# Patient Record
Sex: MALE | Race: NATIVE HAWAIIAN OR OTHER PACIFIC ISLANDER | NOT HISPANIC OR LATINO | Employment: FULL TIME | URBAN - METROPOLITAN AREA
[De-identification: names, ages, dates, MRNs, and addresses within clinical notes are randomized per-mention and may not be internally consistent; named-entity substitution may affect disease eponyms.]

---

## 2017-07-20 ENCOUNTER — OFFICE VISIT (OUTPATIENT)
Dept: URGENT CARE | Facility: PHYSICIAN GROUP | Age: 37
End: 2017-07-20
Payer: COMMERCIAL

## 2017-07-20 VITALS
WEIGHT: 303 LBS | TEMPERATURE: 99.6 F | BODY MASS INDEX: 50.48 KG/M2 | HEART RATE: 120 BPM | HEIGHT: 65 IN | OXYGEN SATURATION: 90 % | RESPIRATION RATE: 20 BRPM | SYSTOLIC BLOOD PRESSURE: 146 MMHG | DIASTOLIC BLOOD PRESSURE: 92 MMHG

## 2017-07-20 DIAGNOSIS — J20.9 BRONCHOSPASM WITH BRONCHITIS, ACUTE: ICD-10-CM

## 2017-07-20 DIAGNOSIS — J01.90 ACUTE RHINOSINUSITIS: ICD-10-CM

## 2017-07-20 DIAGNOSIS — J30.9 ALLERGIC RHINITIS, UNSPECIFIED ALLERGIC RHINITIS TRIGGER, UNSPECIFIED RHINITIS SEASONALITY: ICD-10-CM

## 2017-07-20 PROCEDURE — 99203 OFFICE O/P NEW LOW 30 MIN: CPT | Performed by: EMERGENCY MEDICINE

## 2017-07-20 RX ORDER — AMOXICILLIN AND CLAVULANATE POTASSIUM 875; 125 MG/1; MG/1
1 TABLET, FILM COATED ORAL 2 TIMES DAILY
Qty: 14 TAB | Refills: 0 | Status: SHIPPED | OUTPATIENT
Start: 2017-07-20 | End: 2017-07-27

## 2017-07-20 RX ORDER — BENZONATATE 100 MG/1
100 CAPSULE ORAL 3 TIMES DAILY PRN
Qty: 20 CAP | Refills: 0 | Status: SHIPPED | OUTPATIENT
Start: 2017-07-20 | End: 2019-08-19

## 2017-07-20 ASSESSMENT — ENCOUNTER SYMPTOMS
SORE THROAT: 1
ABDOMINAL PAIN: 0
NAUSEA: 0
HEMOPTYSIS: 0
EYE DISCHARGE: 0
DIARRHEA: 0
SHORTNESS OF BREATH: 0
SWOLLEN GLANDS: 0
SPUTUM PRODUCTION: 1
CHILLS: 0
HEARTBURN: 0
HOARSE VOICE: 0
VOMITING: 0
SINUS PRESSURE: 1
WHEEZING: 1
COUGH: 1
HEADACHES: 0

## 2017-07-20 NOTE — PATIENT INSTRUCTIONS
Avoid smoking!  Cessation is highly recommended, but at least attempt to reduce frequency of smoking until the illness resolves.  Use saline nasal irrigation, such as with a Neti Pot, as needed daily for relief of nasal or sinus congestion relief.  Use over-the-counter inhaled nasal steroid (Flonase, Nasonex, Rhinocort, Nasacort) daily; continue for at least 2-3 weeks.  Use over-the-counter oxymetazoline (Afrin) nasal spray as needed for up to 3 days only; follow package instructions for dosing.  Use over-the-counter pain reliever, such as acetaminophen (Tylenol), ibuprofen (Advil, Motrin) or naproxen (Aleve) as needed; follow package directions for dosing.  Use the prescribed antibiotic Augmentin only if symptoms persist beyond 10-14 days, or re-worsen.  Use an oral probiotic daily, such as Culturelle, Align, or yogurt to reduce gastrointestinal symptoms.      Bronchospasm, Adult  A bronchospasm is when the tubes that carry air in and out of your lungs (airways) spasm or tighten. During a bronchospasm it is hard to breathe. This is because the airways get smaller. A bronchospasm can be triggered by:  · Allergies. These may be to animals, pollen, food, or mold.  · Infection. This is a common cause of bronchospasm.  · Exercise.  · Irritants. These include pollution, cigarette smoke, strong odors, aerosol sprays, and paint fumes.  · Weather changes.  · Stress.  · Being emotional.  HOME CARE   · Always have a plan for getting help. Know when to call your doctor and local emergency services (911 in the [place] Know where you can get emergency care.  · Only take medicines as told by your doctor.  · If you were prescribed an inhaler or nebulizer machine, ask your doctor how to use it correctly. Always use a spacer with your inhaler if you were given one.  · Stay calm during an attack. Try to relax and breathe more slowly.  · Control your home environment:  ¨ Change your heating and air conditioning filter at least once a  month.  ¨ Limit your use of fireplaces and wood stoves.  ¨ Do not  smoke. Do not  allow smoking in your home.  ¨ Avoid perfumes and fragrances.  ¨ Get rid of pests (such as roaches and mice) and their droppings.  ¨ Throw away plants if you see mold on them.  ¨ Keep your house clean and dust free.  ¨ Replace carpet with wood, tile, or vinyl franki. Carpet can trap dander and dust.  ¨ Use allergy-proof pillows, mattress covers, and box spring covers.  ¨ Wash bed sheets and blankets every week in hot water. Dry them in a dryer.  ¨ Use blankets that are made of polyester or cotton.  ¨ Wash hands frequently.  GET HELP IF:  · You have muscle aches.  · You have chest pain.  · The thick spit you spit or cough up (sputum) changes from clear or white to yellow, green, gray, or bloody.  · The thick spit you spit or cough up gets thicker.  · There are problems that may be related to the medicine you are given such as:  ¨ A rash.  ¨ Itching.  ¨ Swelling.  ¨ Trouble breathing.  GET HELP RIGHT AWAY IF:  · You feel you cannot breathe or catch your breath.  · You cannot stop coughing.  · Your treatment is not helping you breathe better.  · You have very bad chest pain.  MAKE SURE YOU:   · Understand these instructions.  · Will watch your condition.  · Will get help right away if you are not doing well or get worse.     This information is not intended to replace advice given to you by your health care provider. Make sure you discuss any questions you have with your health care provider.     Document Released: 10/15/2010 Document Revised: 01/08/2016 Document Reviewed: 06/10/2014  [employer] Interactive Patient Education ©2016 [employer]  Sinusitis, Adult  Sinusitis is redness, soreness, and inflammation of the paranasal sinuses. Paranasal sinuses are air pockets within the bones of your face. They are located beneath your eyes, in the middle of your forehead, and above your eyes. In healthy paranasal sinuses, mucus is able to  drain out, and air is able to circulate through them by way of your nose. However, when your paranasal sinuses are inflamed, mucus and air can become trapped. This can allow bacteria and other germs to grow and cause infection.  Sinusitis can develop quickly and last only a short time (acute) or continue over a long period (chronic). Sinusitis that lasts for more than 12 weeks is considered chronic.  CAUSES  Causes of sinusitis include:  · Allergies.  · Structural abnormalities, such as displacement of the cartilage that separates your nostrils (deviated septum), which can decrease the air flow through your nose and sinuses and affect sinus drainage.  · Functional abnormalities, such as when the small hairs (cilia) that line your sinuses and help remove mucus do not work properly or are not present.  SIGNS AND SYMPTOMS  Symptoms of acute and chronic sinusitis are the same. The primary symptoms are pain and pressure around the affected sinuses. Other symptoms include:  · Upper toothache.  · Earache.  · Headache.  · Bad breath.  · Decreased sense of smell and taste.  · A cough, which worsens when you are lying flat.  · Fatigue.  · Fever.  · Thick drainage from your nose, which often is green and may contain pus (purulent).  · Swelling and warmth over the affected sinuses.  DIAGNOSIS  Your health care provider will perform a physical exam. During your exam, your health care provider may perform any of the following to help determine if you have acute sinusitis or chronic sinusitis:  · Look in your nose for signs of abnormal growths in your nostrils (nasal polyps).  · Tap over the affected sinus to check for signs of infection.  · View the inside of your sinuses using an imaging device that has a light attached (endoscope).  If your health care provider suspects that you have chronic sinusitis, one or more of the following tests may be recommended:  · Allergy tests.  · Nasal culture. A sample of mucus is taken from your  nose, sent to a lab, and screened for bacteria.  · Nasal cytology. A sample of mucus is taken from your nose and examined by your health care provider to determine if your sinusitis is related to an allergy.  TREATMENT  Most cases of acute sinusitis are related to a viral infection and will resolve on their own within 10 days. Sometimes, medicines are prescribed to help relieve symptoms of both acute and chronic sinusitis. These may include pain medicines, decongestants, nasal steroid sprays, or saline sprays.  However, for sinusitis related to a bacterial infection, your health care provider will prescribe antibiotic medicines. These are medicines that will help kill the bacteria causing the infection.  Rarely, sinusitis is caused by a fungal infection. In these cases, your health care provider will prescribe antifungal medicine.  For some cases of chronic sinusitis, surgery is needed. Generally, these are cases in which sinusitis recurs more than 3 times per year, despite other treatments.  HOME CARE INSTRUCTIONS  · Drink plenty of water. Water helps thin the mucus so your sinuses can drain more easily.  · Use a humidifier.  · Inhale steam 3-4 times a day (for example, sit in the bathroom with the shower running).  · Apply a warm, moist washcloth to your face 3-4 times a day, or as directed by your health care provider.  · Use saline nasal sprays to help moisten and clean your sinuses.  · Take medicines only as directed by your health care provider.  · If you were prescribed either an antibiotic or antifungal medicine, finish it all even if you start to feel better.  SEEK IMMEDIATE MEDICAL CARE IF:  · You have increasing pain or severe headaches.  · You have nausea, vomiting, or drowsiness.  · You have swelling around your face.  · You have vision problems.  · You have a stiff neck.  · You have difficulty breathing.     This information is not intended to replace advice given to you by your health care provider.  Make sure you discuss any questions you have with your health care provider.     Document Released: 12/18/2006 Document Revised: 01/08/2016 Document Reviewed: 01/01/2013  [employer] Interactive Patient Education ©2016 [employer]  Allergic Rhinitis  Allergic rhinitis is when the mucous membranes in the nose respond to allergens. Allergens are particles in the air that cause your body to have an allergic reaction. This causes you to release allergic antibodies. Through a chain of events, these eventually cause you to release histamine into the blood stream. Although meant to protect the body, it is this release of histamine that causes your discomfort, such as frequent sneezing, congestion, and an itchy, runny nose.   CAUSES  Seasonal allergic rhinitis (hay fever) is caused by pollen allergens that may come from grasses, trees, and weeds. Year-round allergic rhinitis (perennial allergic rhinitis) is caused by allergens such as house dust mites, pet dander, and mold spores.  SYMPTOMS  · Nasal stuffiness (congestion).  · Itchy, runny nose with sneezing and tearing of the eyes.  DIAGNOSIS  Your health care provider can help you determine the allergen or allergens that trigger your symptoms. If you and your health care provider are unable to determine the allergen, skin or blood testing may be used. Your health care provider will diagnose your condition after taking your health history and performing a physical exam. Your health care provider may assess you for other related conditions, such as asthma, pink eye, or an ear infection.  TREATMENT  Allergic rhinitis does not have a cure, but it can be controlled by:  · Medicines that block allergy symptoms. These may include allergy shots, nasal sprays, and oral antihistamines.  · Avoiding the allergen.  Hay fever may often be treated with antihistamines in pill or nasal spray forms. Antihistamines block the effects of histamine. There are over-the-counter medicines that may  help with nasal congestion and swelling around the eyes. Check with your health care provider before taking or giving this medicine.  If avoiding the allergen or the medicine prescribed do not work, there are many new medicines your health care provider can prescribe. Stronger medicine may be used if initial measures are ineffective. Desensitizing injections can be used if medicine and avoidance does not work. Desensitization is when a patient is given ongoing shots until the body becomes less sensitive to the allergen. Make sure you follow up with your health care provider if problems continue.  HOME CARE INSTRUCTIONS  It is not possible to completely avoid allergens, but you can reduce your symptoms by taking steps to limit your exposure to them. It helps to know exactly what you are allergic to so that you can avoid your specific triggers.  SEEK MEDICAL CARE IF:  · You have a fever.  · You develop a cough that does not stop easily (persistent).  · You have shortness of breath.  · You start wheezing.  · Symptoms interfere with normal daily activities.     This information is not intended to replace advice given to you by your health care provider. Make sure you discuss any questions you have with your health care provider.     Document Released: 09/12/2002 Document Revised: 01/08/2016 Document Reviewed: 08/25/2014  [employer] Interactive Patient Education ©2016 [employer]

## 2017-07-20 NOTE — PROGRESS NOTES
Subjective:      John Wills is a 37 y.o. male who presents with Cough            Sinusitis  This is a new problem. The current episode started in the past 7 days. The problem has been gradually worsening since onset. Maximum temperature: at onset. The pain is mild. Associated symptoms include congestion, coughing, ear pain, sinus pressure and a sore throat. Pertinent negatives include no chills, headaches, hoarse voice, shortness of breath, sneezing or swollen glands. Past treatments include oral decongestants. The treatment provided mild relief.   PMH allergic rhinitis    Review of Systems   Constitutional: Negative for chills.   HENT: Positive for congestion, ear pain, sinus pressure and sore throat. Negative for ear discharge, hearing loss, hoarse voice, nosebleeds, sneezing and tinnitus.    Eyes: Negative for discharge.   Respiratory: Positive for cough, sputum production and wheezing. Negative for hemoptysis and shortness of breath.    Cardiovascular: Negative for chest pain.   Gastrointestinal: Negative for heartburn, nausea, vomiting, abdominal pain and diarrhea.   Skin: Negative for itching and rash.   Neurological: Negative for headaches.   Endo/Heme/Allergies: Positive for environmental allergies.     PMH:  has a past medical history of Allergy. He also has no past medical history of Asthma or Diabetes (CMS-[facility]).  MEDS:   Current outpatient prescriptions:   •  Phenylephrine-APAP-Guaifenesin (MUCINEX FAST-MAX COLD & SINUS PO), Take  by mouth., Disp: , Rfl:   •  Albuterol Sulfate 108 (90 BASE) MCG/ACT AEROSOL POWDER, BREATH ACTIVATED, Inhale 1-2 Puffs by mouth every 6 hours as needed (coughing, wheezing)., Disp: 1 Each, Rfl: 0  •  benzonatate (TESSALON) 100 MG Cap, Take 1 Cap by mouth 3 times a day as needed for Cough., Disp: 20 Cap, Rfl: 0  •  amoxicillin-clavulanate (AUGMENTIN) 875-125 MG Tab, Take 1 Tab by mouth 2 times a day for 7 days., Disp: 14 Tab, Rfl: 0  •  fluticasone (FLONASE) 50 MCG/ACT  "nasal spray, Spray 2 Sprays in nose every day., Disp: 16 g, Rfl: 0  •  naproxen (NAPROSYN) 500 MG TABS, Take 1 Tab by mouth 2 times a day, with meals., Disp: 30 Tab, Rfl: 0  •  hydrocod polst-chlorphen polst (TUSSIONEX) 10-8 MG/5ML LQCR, Take 5 mL by mouth every 12 hours., Disp: 120 mL, Rfl: 0  ALLERGIES:   Allergies   Allergen Reactions   • Sulfa Drugs      SURGHX: History reviewed. No pertinent past surgical history.  SOCHX:  reports that he has been smoking Cigarettes.  He does not have any smokeless tobacco history on file.  FH: family history is not on file.       Objective:     /92 mmHg  Pulse 120  Temp(Src) 37.6 °C (99.6 °F)  Resp 20  Ht 1.651 m (5' 5\")  Wt 137.44 kg (303 lb)  BMI 50.42 kg/m2  SpO2 90%     Physical Exam   Constitutional: He appears well-developed and well-nourished. He is cooperative.  Non-toxic appearance. He does not appear ill. No distress.   HENT:   Head: Normocephalic.   Right Ear: Tympanic membrane and ear canal normal.   Left Ear: Tympanic membrane and ear canal normal.   Nose: Mucosal edema and rhinorrhea present. Right sinus exhibits maxillary sinus tenderness. Left sinus exhibits maxillary sinus tenderness.   Mouth/Throat: Uvula is midline. No trismus in the jaw. No oropharyngeal exudate, posterior oropharyngeal edema or posterior oropharyngeal erythema.   Eyes: Conjunctivae are normal.   Neck: Trachea normal. Neck supple.   Cardiovascular: Normal rate, regular rhythm and normal heart sounds.    Not tachycardic. No significant pedal edema.   Pulmonary/Chest: No accessory muscle usage. Tachypnea noted. He has wheezes. He has rhonchi. He has no rales.   Rhonchi clear with cough.   Lymphadenopathy:     He has no cervical adenopathy.   Neurological: He is alert.   Skin: Skin is warm and dry.   Psychiatric: He has a normal mood and affect.               Assessment/Plan:     1. Acute rhinosinusitis  Wait and see ATB due to duration  - amoxicillin-clavulanate (AUGMENTIN) " 875-125 MG Tab; Take 1 Tab by mouth 2 times a day for 7 days.  Dispense: 14 Tab; Refill: 0    2. Bronchospasm with bronchitis, acute  Advised smoking avoidance/cessation.  - Albuterol Sulfate 108 (90 BASE) MCG/ACT AEROSOL POWDER, BREATH ACTIVATED; Inhale 1-2 Puffs by mouth every 6 hours as needed (coughing, wheezing).  Dispense: 1 Each; Refill: 0  - benzonatate (TESSALON) 100 MG Cap; Take 1 Cap by mouth 3 times a day as needed for Cough.  Dispense: 20 Cap; Refill: 0    3. Allergic rhinitis, unspecified allergic rhinitis trigger, unspecified rhinitis seasonality  Recommended nasal saline irrigation daily, OTC inhaled nasal steroid daily, OTC decongestant when necessary as tolerated.

## 2017-07-20 NOTE — MR AVS SNAPSHOT
"        John Webbro   2017 9:30 AM   Office Visit   MRN: [id number]    Department:  [facility]   Dept Phone:  [phone]    Description:  Male : 1980   Provider:  Mateo Luna M.D.           Reason for Visit     Cough sinus pain ans pressure, Nasal and chest congestion X 1 week       Allergies as of 2017     Allergen Noted Reactions    Sulfa Drugs 2015         You were diagnosed with     Acute rhinosinusitis   [242152]       Bronchospasm with bronchitis, acute   [[id number]]       Allergic rhinitis, unspecified allergic rhinitis trigger, unspecified rhinitis seasonality   [6150388]         Vital Signs     Blood Pressure Pulse Temperature Respirations Height Weight    146/92 mmHg 120 37.6 °C (99.6 °F) 20 1.651 m (5' 5\") 137.44 kg (303 lb)    Body Mass Index Oxygen Saturation Smoking Status             50.42 kg/m2 90% Current Every Day Smoker         Basic Information     Date Of Birth Sex Race Ethnicity Preferred Language    1980 Male Unknown Unknown English      Health Maintenance        Date Due Completion Dates    IMM DTaP/Tdap/Td Vaccine (1 - Tdap) 3/16/1999 ---    IMM INFLUENZA (1) 2017 ---            Current Immunizations     No immunizations on file.      Below and/or attached are the medications your provider expects you to take. Review all of your home medications and newly ordered medications with your provider and/or pharmacist. Follow medication instructions as directed by your provider and/or pharmacist. Please keep your medication list with you and share with your provider. Update the information when medications are discontinued, doses are changed, or new medications (including over-the-counter products) are added; and carry medication information at all times in the event of emergency situations     Allergies:  SULFA DRUGS - (reactions not documented)               Medications  Valid as of: 2017 - 10:22 AM    Generic Name Brand Name Tablet Size " Instructions for use    Albuterol Sulfate (AEROSOL POWDER, BREATH ACTIVATED) Albuterol Sulfate 108 (90 BASE) MCG/ACT Inhale 1-2 Puffs by mouth every 6 hours as needed (coughing, wheezing).        Amoxicillin-Pot Clavulanate (Tab) AUGMENTIN 875-125 MG Take 1 Tab by mouth 2 times a day for 7 days.        Benzonatate (Cap) TESSALON 100 MG Take 1 Cap by mouth 3 times a day as needed for Cough.        Fluticasone Propionate (Suspension) FLONASE 50 MCG/ACT Spray 2 Sprays in nose every day.        Hydrocod Polst-Chlorphen Polst (Liquid CR) TUSSIONEX 10-8 MG/5ML Take 5 mL by mouth every 12 hours.        Naproxen (Tab) NAPROSYN 500 MG Take 1 Tab by mouth 2 times a day, with meals.        Phenylephrine-APAP-Guaifenesin   Take  by mouth.        .                 Medicines prescribed today were sent to:     [facility] [address] [place] [address] [address] AT [facility] [employer] [facility] [place] [address]    Phone: [phone] Fax: [phone]    Open 24 Hours?: No      Medication refill instructions:       If your prescription bottle indicates you have medication refills left, it is not necessary to call your provider’s office. Please contact your pharmacy and they will refill your medication.    If your prescription bottle indicates you do not have any refills left, you may request refills at any time through one of the following ways: The online [employer] system (except Urgent Care), by calling your provider’s office, or by asking your pharmacy to contact your provider’s office with a refill request. Medication refills are processed only during regular business hours and may not be available until the next business day. Your provider may request additional information or to have a follow-up visit with you prior to refilling your medication.   *Please Note: Medication refills are assigned a new Rx number when refilled electronically. Your pharmacy may indicate that no refills were authorized even  though a new prescription for the same medication is available at the pharmacy. Please request the medicine by name with the pharmacy before contacting your provider for a refill.        Instructions    Avoid smoking!  Cessation is highly recommended, but at least attempt to reduce frequency of smoking until the illness resolves.  Use saline nasal irrigation, such as with a Neti Pot, as needed daily for relief of nasal or sinus congestion relief.  Use over-the-counter inhaled nasal steroid (Flonase, Nasonex, Rhinocort, Nasacort) daily; continue for at least 2-3 weeks.  Use over-the-counter oxymetazoline (Afrin) nasal spray as needed for up to 3 days only; follow package instructions for dosing.  Use over-the-counter pain reliever, such as acetaminophen (Tylenol), ibuprofen (Advil, Motrin) or naproxen (Aleve) as needed; follow package directions for dosing.  Use the prescribed antibiotic Augmentin only if symptoms persist beyond 10-14 days, or re-worsen.  Use an oral probiotic daily, such as Culturelle, Align, or yogurt to reduce gastrointestinal symptoms.      Bronchospasm, Adult  A bronchospasm is when the tubes that carry air in and out of your lungs (airways) spasm or tighten. During a bronchospasm it is hard to breathe. This is because the airways get smaller. A bronchospasm can be triggered by:  · Allergies. These may be to animals, pollen, food, or mold.  · Infection. This is a common cause of bronchospasm.  · Exercise.  · Irritants. These include pollution, cigarette smoke, strong odors, aerosol sprays, and paint fumes.  · Weather changes.  · Stress.  · Being emotional.  HOME CARE   · Always have a plan for getting help. Know when to call your doctor and local emergency services (911 in the [place] Know where you can get emergency care.  · Only take medicines as told by your doctor.  · If you were prescribed an inhaler or nebulizer machine, ask your doctor how to use it correctly. Always use a spacer with your  inhaler if you were given one.  · Stay calm during an attack. Try to relax and breathe more slowly.  · Control your home environment:  ¨ Change your heating and air conditioning filter at least once a month.  ¨ Limit your use of fireplaces and wood stoves.  ¨ Do not  smoke. Do not  allow smoking in your home.  ¨ Avoid perfumes and fragrances.  ¨ Get rid of pests (such as roaches and mice) and their droppings.  ¨ Throw away plants if you see mold on them.  ¨ Keep your house clean and dust free.  ¨ Replace carpet with wood, tile, or vinyl franki. Carpet can trap dander and dust.  ¨ Use allergy-proof pillows, mattress covers, and box spring covers.  ¨ Wash bed sheets and blankets every week in hot water. Dry them in a dryer.  ¨ Use blankets that are made of polyester or cotton.  ¨ Wash hands frequently.  GET HELP IF:  · You have muscle aches.  · You have chest pain.  · The thick spit you spit or cough up (sputum) changes from clear or white to yellow, green, gray, or bloody.  · The thick spit you spit or cough up gets thicker.  · There are problems that may be related to the medicine you are given such as:  ¨ A rash.  ¨ Itching.  ¨ Swelling.  ¨ Trouble breathing.  GET HELP RIGHT AWAY IF:  · You feel you cannot breathe or catch your breath.  · You cannot stop coughing.  · Your treatment is not helping you breathe better.  · You have very bad chest pain.  MAKE SURE YOU:   · Understand these instructions.  · Will watch your condition.  · Will get help right away if you are not doing well or get worse.     This information is not intended to replace advice given to you by your health care provider. Make sure you discuss any questions you have with your health care provider.     Document Released: 10/15/2010 Document Revised: 01/08/2016 Document Reviewed: 06/10/2014  [employer] Interactive Patient Education ©2016 [employer]  Sinusitis, Adult  Sinusitis is redness, soreness, and inflammation of the paranasal sinuses.  Paranasal sinuses are air pockets within the bones of your face. They are located beneath your eyes, in the middle of your forehead, and above your eyes. In healthy paranasal sinuses, mucus is able to drain out, and air is able to circulate through them by way of your nose. However, when your paranasal sinuses are inflamed, mucus and air can become trapped. This can allow bacteria and other germs to grow and cause infection.  Sinusitis can develop quickly and last only a short time (acute) or continue over a long period (chronic). Sinusitis that lasts for more than 12 weeks is considered chronic.  CAUSES  Causes of sinusitis include:  · Allergies.  · Structural abnormalities, such as displacement of the cartilage that separates your nostrils (deviated septum), which can decrease the air flow through your nose and sinuses and affect sinus drainage.  · Functional abnormalities, such as when the small hairs (cilia) that line your sinuses and help remove mucus do not work properly or are not present.  SIGNS AND SYMPTOMS  Symptoms of acute and chronic sinusitis are the same. The primary symptoms are pain and pressure around the affected sinuses. Other symptoms include:  · Upper toothache.  · Earache.  · Headache.  · Bad breath.  · Decreased sense of smell and taste.  · A cough, which worsens when you are lying flat.  · Fatigue.  · Fever.  · Thick drainage from your nose, which often is green and may contain pus (purulent).  · Swelling and warmth over the affected sinuses.  DIAGNOSIS  Your health care provider will perform a physical exam. During your exam, your health care provider may perform any of the following to help determine if you have acute sinusitis or chronic sinusitis:  · Look in your nose for signs of abnormal growths in your nostrils (nasal polyps).  · Tap over the affected sinus to check for signs of infection.  · View the inside of your sinuses using an imaging device that has a light attached  (endoscope).  If your health care provider suspects that you have chronic sinusitis, one or more of the following tests may be recommended:  · Allergy tests.  · Nasal culture. A sample of mucus is taken from your nose, sent to a lab, and screened for bacteria.  · Nasal cytology. A sample of mucus is taken from your nose and examined by your health care provider to determine if your sinusitis is related to an allergy.  TREATMENT  Most cases of acute sinusitis are related to a viral infection and will resolve on their own within 10 days. Sometimes, medicines are prescribed to help relieve symptoms of both acute and chronic sinusitis. These may include pain medicines, decongestants, nasal steroid sprays, or saline sprays.  However, for sinusitis related to a bacterial infection, your health care provider will prescribe antibiotic medicines. These are medicines that will help kill the bacteria causing the infection.  Rarely, sinusitis is caused by a fungal infection. In these cases, your health care provider will prescribe antifungal medicine.  For some cases of chronic sinusitis, surgery is needed. Generally, these are cases in which sinusitis recurs more than 3 times per year, despite other treatments.  HOME CARE INSTRUCTIONS  · Drink plenty of water. Water helps thin the mucus so your sinuses can drain more easily.  · Use a humidifier.  · Inhale steam 3-4 times a day (for example, sit in the bathroom with the shower running).  · Apply a warm, moist washcloth to your face 3-4 times a day, or as directed by your health care provider.  · Use saline nasal sprays to help moisten and clean your sinuses.  · Take medicines only as directed by your health care provider.  · If you were prescribed either an antibiotic or antifungal medicine, finish it all even if you start to feel better.  SEEK IMMEDIATE MEDICAL CARE IF:  · You have increasing pain or severe headaches.  · You have nausea, vomiting, or drowsiness.  · You have  swelling around your face.  · You have vision problems.  · You have a stiff neck.  · You have difficulty breathing.     This information is not intended to replace advice given to you by your health care provider. Make sure you discuss any questions you have with your health care provider.     Document Released: 12/18/2006 Document Revised: 01/08/2016 Document Reviewed: 01/01/2013  [employer] Interactive Patient Education ©2016 [employer].  Allergic Rhinitis  Allergic rhinitis is when the mucous membranes in the nose respond to allergens. Allergens are particles in the air that cause your body to have an allergic reaction. This causes you to release allergic antibodies. Through a chain of events, these eventually cause you to release histamine into the blood stream. Although meant to protect the body, it is this release of histamine that causes your discomfort, such as frequent sneezing, congestion, and an itchy, runny nose.   CAUSES  Seasonal allergic rhinitis (hay fever) is caused by pollen allergens that may come from grasses, trees, and weeds. Year-round allergic rhinitis (perennial allergic rhinitis) is caused by allergens such as house dust mites, pet dander, and mold spores.  SYMPTOMS  · Nasal stuffiness (congestion).  · Itchy, runny nose with sneezing and tearing of the eyes.  DIAGNOSIS  Your health care provider can help you determine the allergen or allergens that trigger your symptoms. If you and your health care provider are unable to determine the allergen, skin or blood testing may be used. Your health care provider will diagnose your condition after taking your health history and performing a physical exam. Your health care provider may assess you for other related conditions, such as asthma, pink eye, or an ear infection.  TREATMENT  Allergic rhinitis does not have a cure, but it can be controlled by:  · Medicines that block allergy symptoms. These may include allergy shots, nasal sprays, and oral  antihistamines.  · Avoiding the allergen.  Hay fever may often be treated with antihistamines in pill or nasal spray forms. Antihistamines block the effects of histamine. There are over-the-counter medicines that may help with nasal congestion and swelling around the eyes. Check with your health care provider before taking or giving this medicine.  If avoiding the allergen or the medicine prescribed do not work, there are many new medicines your health care provider can prescribe. Stronger medicine may be used if initial measures are ineffective. Desensitizing injections can be used if medicine and avoidance does not work. Desensitization is when a patient is given ongoing shots until the body becomes less sensitive to the allergen. Make sure you follow up with your health care provider if problems continue.  HOME CARE INSTRUCTIONS  It is not possible to completely avoid allergens, but you can reduce your symptoms by taking steps to limit your exposure to them. It helps to know exactly what you are allergic to so that you can avoid your specific triggers.  SEEK MEDICAL CARE IF:  · You have a fever.  · You develop a cough that does not stop easily (persistent).  · You have shortness of breath.  · You start wheezing.  · Symptoms interfere with normal daily activities.     This information is not intended to replace advice given to you by your health care provider. Make sure you discuss any questions you have with your health care provider.     Document Released: 09/12/2002 Document Revised: 01/08/2016 Document Reviewed: 08/25/2014  [employer] Interactive Patient Education ©2016 Else[employer] Inc.            Metafor Software Access Code: [email or web address]  Expires: 8/19/2017 10:22 AM    Metafor Software  A secure, online tool to manage your health information     OneTok® is a secure, online tool that connects you to your personalized health information from the privacy of your home -- day or night - making it very easy for you  to manage your healthcare. Once the activation process is completed, you can even access your medical information using the LinkStorm lemuel, which is available for free in the Apple Lemuel store or Google Play store.     LinkStorm provides the following levels of access (as shown below):   My Chart Features   [facility] Doctor [facility]  Specialists [facility]  Urgent  Care [facility]  Primary Care  Doctor   Email your healthcare team securely and privately 24/7 X X X    Manage appointments: schedule your next appointment; view details of past/upcoming appointments X      Request prescription refills. X      View recent personal medical records, including lab and immunizations X X X X   View health record, including health history, allergies, medications X X X X   Read reports about your outpatient visits, procedures, consult and ER notes X X X X   See your discharge summary, which is a recap of your hospital and/or ER visit that includes your diagnosis, lab results, and care plan. X X       How to register for LinkStorm:  1. Go to  [email or web address].  2. Click on the Sign Up Now box, which takes you to the New Member Sign Up page. You will need to provide the following information:  a. Enter your LinkStorm Access Code exactly as it appears at the top of this page. (You will not need to use this code after you’ve completed the sign-up process. If you do not sign up before the expiration date, you must request a new code.)   b. Enter your date of birth.   c. Enter your home email address.   d. Click Submit, and follow the next screen’s instructions.  3. Create a LinkStorm ID. This will be your LinkStorm login ID and cannot be changed, so think of one that is secure and easy to remember.  4. Create a LinkStorm password. You can change your password at any time.  5. Enter your Password Reset Question and Answer. This can be used at a later time if you forget your password.   6. Enter your e-mail address. This allows you to  receive e-mail notifications when new information is available in [employer].  7. Click Sign Up. You can now view your health information.    For assistance activating your [employer] account, call [phone]        Quit Tobacco Information     Do you want to quit using tobacco?    Quitting tobacco decreases risks of cancer, heart and lung disease, increases life expectancy, improves sense of taste and smell, and increases spending money, among other benefits.    If you are thinking about quitting, we can help.  • [facility] Quit Tobacco Program: 733.151.6198  o Program occurs weekly for four weeks and includes pharmacist consultation on products to support quitting smoking or chewing tobacco. A provider referral is needed for pharmacist consultation.  • Tobacco Users Help Hotline: 8-638-QUITNOW (387-8296) or https://[place].quitlogix.org/  o Free, confidential telephone and online coaching for [place] residents. Sessions are designed on a schedule that is convenient for you. Eligible clients receive free nicotine replacement therapy.  • Nationally: www.smokefree.gov  o Information and professional assistance to support both immediate and long-term needs as you become, and remain, a non-smoker. Smokefree.gov allows you to choose the help that best fits your needs.

## 2019-08-19 ENCOUNTER — APPOINTMENT (OUTPATIENT)
Dept: RADIOLOGY | Facility: MEDICAL CENTER | Age: 39
DRG: 291 | End: 2019-08-19
Attending: EMERGENCY MEDICINE
Payer: COMMERCIAL

## 2019-08-19 ENCOUNTER — OFFICE VISIT (OUTPATIENT)
Dept: URGENT CARE | Facility: PHYSICIAN GROUP | Age: 39
End: 2019-08-19
Payer: COMMERCIAL

## 2019-08-19 ENCOUNTER — HOSPITAL ENCOUNTER (INPATIENT)
Facility: MEDICAL CENTER | Age: 39
LOS: 2 days | DRG: 291 | End: 2019-08-21
Attending: EMERGENCY MEDICINE | Admitting: INTERNAL MEDICINE
Payer: COMMERCIAL

## 2019-08-19 VITALS
SYSTOLIC BLOOD PRESSURE: 208 MMHG | TEMPERATURE: 98.6 F | DIASTOLIC BLOOD PRESSURE: 120 MMHG | WEIGHT: 315 LBS | BODY MASS INDEX: 52.48 KG/M2 | OXYGEN SATURATION: 94 % | HEIGHT: 65 IN | HEART RATE: 112 BPM

## 2019-08-19 DIAGNOSIS — I50.811 ACUTE RIGHT-SIDED CONGESTIVE HEART FAILURE (HCC): ICD-10-CM

## 2019-08-19 DIAGNOSIS — I50.9 ACUTE CONGESTIVE HEART FAILURE, UNSPECIFIED HEART FAILURE TYPE (HCC): Primary | ICD-10-CM

## 2019-08-19 DIAGNOSIS — I16.1 HYPERTENSIVE EMERGENCY: ICD-10-CM

## 2019-08-19 DIAGNOSIS — R00.0 TACHYCARDIA: ICD-10-CM

## 2019-08-19 DIAGNOSIS — I16.1 HYPERTENSIVE EMERGENCY: Primary | ICD-10-CM

## 2019-08-19 DIAGNOSIS — R60.0 BILATERAL LOWER EXTREMITY EDEMA: ICD-10-CM

## 2019-08-19 LAB
ALBUMIN SERPL BCP-MCNC: 3.6 G/DL (ref 3.2–4.9)
ALBUMIN/GLOB SERPL: 1.1 G/DL
ALP SERPL-CCNC: 80 U/L (ref 30–99)
ALT SERPL-CCNC: 26 U/L (ref 2–50)
ANION GAP SERPL CALC-SCNC: 8 MMOL/L (ref 0–11.9)
AST SERPL-CCNC: 24 U/L (ref 12–45)
BASOPHILS # BLD AUTO: 1.1 % (ref 0–1.8)
BASOPHILS # BLD: 0.12 K/UL (ref 0–0.12)
BILIRUB SERPL-MCNC: 1 MG/DL (ref 0.1–1.5)
BUN SERPL-MCNC: 17 MG/DL (ref 8–22)
CALCIUM SERPL-MCNC: 9.4 MG/DL (ref 8.5–10.5)
CHLORIDE SERPL-SCNC: 101 MMOL/L (ref 96–112)
CO2 SERPL-SCNC: 29 MMOL/L (ref 20–33)
CREAT SERPL-MCNC: 0.99 MG/DL (ref 0.5–1.4)
D DIMER PPP IA.FEU-MCNC: 0.63 UG/ML (FEU) (ref 0–0.5)
EKG IMPRESSION: NORMAL
EOSINOPHIL # BLD AUTO: 0.01 K/UL (ref 0–0.51)
EOSINOPHIL NFR BLD: 0.1 % (ref 0–6.9)
ERYTHROCYTE [DISTWIDTH] IN BLOOD BY AUTOMATED COUNT: 42.4 FL (ref 35.9–50)
GLOBULIN SER CALC-MCNC: 3.3 G/DL (ref 1.9–3.5)
GLUCOSE BLD-MCNC: 150 MG/DL (ref 65–99)
GLUCOSE SERPL-MCNC: 239 MG/DL (ref 65–99)
HCT VFR BLD AUTO: 56.6 % (ref 42–52)
HGB BLD-MCNC: 19.3 G/DL (ref 14–18)
IMM GRANULOCYTES # BLD AUTO: 0.04 K/UL (ref 0–0.11)
IMM GRANULOCYTES NFR BLD AUTO: 0.4 % (ref 0–0.9)
LYMPHOCYTES # BLD AUTO: 1.81 K/UL (ref 1–4.8)
LYMPHOCYTES NFR BLD: 16.7 % (ref 22–41)
MCH RBC QN AUTO: 30.5 PG (ref 27–33)
MCHC RBC AUTO-ENTMCNC: 34.1 G/DL (ref 33.7–35.3)
MCV RBC AUTO: 89.6 FL (ref 81.4–97.8)
MONOCYTES # BLD AUTO: 0.74 K/UL (ref 0–0.85)
MONOCYTES NFR BLD AUTO: 6.8 % (ref 0–13.4)
NEUTROPHILS # BLD AUTO: 8.12 K/UL (ref 1.82–7.42)
NEUTROPHILS NFR BLD: 74.9 % (ref 44–72)
NRBC # BLD AUTO: 0 K/UL
NRBC BLD-RTO: 0 /100 WBC
NT-PROBNP SERPL IA-MCNC: 1366 PG/ML (ref 0–125)
PLATELET # BLD AUTO: 170 K/UL (ref 164–446)
PMV BLD AUTO: 10.8 FL (ref 9–12.9)
POTASSIUM SERPL-SCNC: 3.7 MMOL/L (ref 3.6–5.5)
PROT SERPL-MCNC: 6.9 G/DL (ref 6–8.2)
RBC # BLD AUTO: 6.32 M/UL (ref 4.7–6.1)
SODIUM SERPL-SCNC: 138 MMOL/L (ref 135–145)
TROPONIN T SERPL-MCNC: 29 NG/L (ref 6–19)
TROPONIN T SERPL-MCNC: 32 NG/L (ref 6–19)
WBC # BLD AUTO: 10.8 K/UL (ref 4.8–10.8)

## 2019-08-19 PROCEDURE — 700102 HCHG RX REV CODE 250 W/ 637 OVERRIDE(OP): Performed by: STUDENT IN AN ORGANIZED HEALTH CARE EDUCATION/TRAINING PROGRAM

## 2019-08-19 PROCEDURE — 304561 HCHG STAT O2

## 2019-08-19 PROCEDURE — 700111 HCHG RX REV CODE 636 W/ 250 OVERRIDE (IP): Performed by: STUDENT IN AN ORGANIZED HEALTH CARE EDUCATION/TRAINING PROGRAM

## 2019-08-19 PROCEDURE — 93005 ELECTROCARDIOGRAM TRACING: CPT | Performed by: EMERGENCY MEDICINE

## 2019-08-19 PROCEDURE — 96374 THER/PROPH/DIAG INJ IV PUSH: CPT

## 2019-08-19 PROCEDURE — 85379 FIBRIN DEGRADATION QUANT: CPT

## 2019-08-19 PROCEDURE — 84484 ASSAY OF TROPONIN QUANT: CPT | Mod: 91

## 2019-08-19 PROCEDURE — 93000 ELECTROCARDIOGRAM COMPLETE: CPT | Performed by: PHYSICIAN ASSISTANT

## 2019-08-19 PROCEDURE — A9270 NON-COVERED ITEM OR SERVICE: HCPCS | Performed by: STUDENT IN AN ORGANIZED HEALTH CARE EDUCATION/TRAINING PROGRAM

## 2019-08-19 PROCEDURE — 36415 COLL VENOUS BLD VENIPUNCTURE: CPT

## 2019-08-19 PROCEDURE — 770020 HCHG ROOM/CARE - TELE (206)

## 2019-08-19 PROCEDURE — 83880 ASSAY OF NATRIURETIC PEPTIDE: CPT

## 2019-08-19 PROCEDURE — 94760 N-INVAS EAR/PLS OXIMETRY 1: CPT

## 2019-08-19 PROCEDURE — 93005 ELECTROCARDIOGRAM TRACING: CPT

## 2019-08-19 PROCEDURE — 82962 GLUCOSE BLOOD TEST: CPT | Mod: 91

## 2019-08-19 PROCEDURE — 99285 EMERGENCY DEPT VISIT HI MDM: CPT

## 2019-08-19 PROCEDURE — 80053 COMPREHEN METABOLIC PANEL: CPT

## 2019-08-19 PROCEDURE — 71045 X-RAY EXAM CHEST 1 VIEW: CPT

## 2019-08-19 PROCEDURE — 99213 OFFICE O/P EST LOW 20 MIN: CPT | Mod: 25 | Performed by: PHYSICIAN ASSISTANT

## 2019-08-19 PROCEDURE — 96375 TX/PRO/DX INJ NEW DRUG ADDON: CPT

## 2019-08-19 PROCEDURE — 85025 COMPLETE CBC W/AUTO DIFF WBC: CPT

## 2019-08-19 RX ORDER — INSULIN GLARGINE 100 [IU]/ML
15 INJECTION, SOLUTION SUBCUTANEOUS EVERY EVENING
Status: DISCONTINUED | OUTPATIENT
Start: 2019-08-19 | End: 2019-08-19

## 2019-08-19 RX ORDER — ASPIRIN 325 MG
325 TABLET ORAL ONCE
Status: COMPLETED | OUTPATIENT
Start: 2019-08-19 | End: 2019-08-19

## 2019-08-19 RX ORDER — AMOXICILLIN 250 MG
2 CAPSULE ORAL 2 TIMES DAILY
Status: DISCONTINUED | OUTPATIENT
Start: 2019-08-19 | End: 2019-08-21 | Stop reason: HOSPADM

## 2019-08-19 RX ORDER — FUROSEMIDE 10 MG/ML
40 INJECTION INTRAMUSCULAR; INTRAVENOUS
Status: DISCONTINUED | OUTPATIENT
Start: 2019-08-20 | End: 2019-08-20

## 2019-08-19 RX ORDER — POLYETHYLENE GLYCOL 3350 17 G/17G
1 POWDER, FOR SOLUTION ORAL
Status: DISCONTINUED | OUTPATIENT
Start: 2019-08-19 | End: 2019-08-21 | Stop reason: HOSPADM

## 2019-08-19 RX ORDER — FUROSEMIDE 10 MG/ML
80 INJECTION INTRAMUSCULAR; INTRAVENOUS ONCE
Status: COMPLETED | OUTPATIENT
Start: 2019-08-19 | End: 2019-08-19

## 2019-08-19 RX ORDER — LISINOPRIL 10 MG/1
10 TABLET ORAL
Status: DISCONTINUED | OUTPATIENT
Start: 2019-08-19 | End: 2019-08-20

## 2019-08-19 RX ORDER — LISINOPRIL 10 MG/1
10 TABLET ORAL
Status: DISCONTINUED | OUTPATIENT
Start: 2019-08-20 | End: 2019-08-19

## 2019-08-19 RX ORDER — BISACODYL 10 MG
10 SUPPOSITORY, RECTAL RECTAL
Status: DISCONTINUED | OUTPATIENT
Start: 2019-08-19 | End: 2019-08-21 | Stop reason: HOSPADM

## 2019-08-19 RX ORDER — ENALAPRILAT 1.25 MG/ML
2.5 INJECTION INTRAVENOUS ONCE
Status: COMPLETED | OUTPATIENT
Start: 2019-08-19 | End: 2019-08-19

## 2019-08-19 RX ORDER — HYDRALAZINE HYDROCHLORIDE 20 MG/ML
10 INJECTION INTRAMUSCULAR; INTRAVENOUS EVERY 6 HOURS PRN
Status: DISCONTINUED | OUTPATIENT
Start: 2019-08-19 | End: 2019-08-20

## 2019-08-19 RX ADMIN — INSULIN LISPRO 1 UNITS: 100 INJECTION, SOLUTION INTRAVENOUS; SUBCUTANEOUS at 23:59

## 2019-08-19 RX ADMIN — FUROSEMIDE 80 MG: 10 INJECTION, SOLUTION INTRAMUSCULAR; INTRAVENOUS at 17:01

## 2019-08-19 RX ADMIN — ENALAPRILAT 2.5 MG: 1.25 INJECTION INTRAVENOUS at 17:01

## 2019-08-19 RX ADMIN — LISINOPRIL 10 MG: 10 TABLET ORAL at 21:26

## 2019-08-19 RX ADMIN — HYDRALAZINE HYDROCHLORIDE 10 MG: 20 INJECTION INTRAMUSCULAR; INTRAVENOUS at 21:27

## 2019-08-19 RX ADMIN — ASPIRIN 325 MG: 325 TABLET, FILM COATED ORAL at 19:08

## 2019-08-19 ASSESSMENT — LIFESTYLE VARIABLES: EVER_SMOKED: YES

## 2019-08-19 ASSESSMENT — COPD QUESTIONNAIRES: HAVE YOU SMOKED AT LEAST 100 CIGARETTES IN YOUR ENTIRE LIFE: YES

## 2019-08-19 ASSESSMENT — PATIENT HEALTH QUESTIONNAIRE - PHQ9
1. LITTLE INTEREST OR PLEASURE IN DOING THINGS: NOT AT ALL
SUM OF ALL RESPONSES TO PHQ9 QUESTIONS 1 AND 2: 0
2. FEELING DOWN, DEPRESSED, IRRITABLE, OR HOPELESS: NOT AT ALL

## 2019-08-19 ASSESSMENT — ENCOUNTER SYMPTOMS
SHORTNESS OF BREATH: 0
EYES NEGATIVE: 1
CHILLS: 0
GASTROINTESTINAL NEGATIVE: 1
ABDOMINAL PAIN: 0
MUSCULOSKELETAL NEGATIVE: 1
PALPITATIONS: 0
FEVER: 0
LEG SWELLING: 1
CONSTITUTIONAL NEGATIVE: 1
VOMITING: 0
CONSTIPATION: 0
FATIGUE: 1
PND: 0
DIARRHEA: 0
NAUSEA: 0
PND: 1
COUGH: 0
PALPITATIONS: 0
HEADACHES: 0
VISUAL CHANGE: 0
ORTHOPNEA: 0
RESPIRATORY NEGATIVE: 1
CONSTIPATION: 0
NEUROLOGICAL NEGATIVE: 1
BLOOD IN STOOL: 0
DIZZINESS: 0
ORTHOPNEA: 1

## 2019-08-19 NOTE — ED NOTES
Med rec updated and complete. Allergies reviewed,  Pt is not currently taking medications.  Home pharmacy [facility].

## 2019-08-19 NOTE — PROGRESS NOTES
Subjective:   John Wills is a 39 y.o. male who presents for Edema (bilateral leg swelling and drainage )        Leg Swelling   This is a new problem. The current episode started yesterday. The problem occurs constantly. The problem has been unchanged. Associated symptoms include fatigue. Pertinent negatives include no abdominal pain, chest pain, chills, coughing, fever, headaches, nausea, visual change or vomiting. He has tried nothing for the symptoms.     Review of Systems   Constitutional: Positive for fatigue. Negative for chills, fever and malaise/fatigue.   Respiratory: Negative for cough and shortness of breath.    Cardiovascular: Positive for leg swelling. Negative for chest pain, palpitations, orthopnea and PND.   Gastrointestinal: Negative for abdominal pain, constipation, diarrhea, nausea and vomiting.   Neurological: Negative for dizziness and headaches.   All other systems reviewed and are negative.      PMH:  has a past medical history of Allergy. He also has no past medical history of Asthma or Diabetes ([facility]).  MEDS: No current facility-administered medications for this visit.   No current outpatient medications on file.    Facility-Administered Medications Ordered in Other Visits:   •  chlorthalidone (HYGROTON) tablet 25 mg, 25 mg, Oral, Q DAY, Sandra Nguyen M.D., 25 mg at 08/20/19 0120  •  potassium chloride SA (Kdur) tablet 40 mEq, 40 mEq, Oral, Once, Jessica Anderson M.D.  •  [DISCONTINUED] insulin glargine (LANTUS) injection 15 Units, 15 Units, Subcutaneous, Q EVENING, Stopped at 08/19/19 1845 **AND** insulin lispro (HUMALOG) injection 1-6 Units, 1-6 Units, Subcutaneous, TID AC **AND** Accu-Chek Q6 if NPO, , , Q6H **AND** NOTIFY MD and PharmD, , , Once **AND** glucose 4 g chewable tablet 16 g, 16 g, Oral, Q15 MIN PRN **AND** DEXTROSE 10% BOLUS 250 mL, 250 mL, Intravenous, Q15 MIN PRN, Tl King M.D.  •  [START ON 8/21/2019] lisinopril (PRINIVIL) tablet 20 mg, 20 mg, Oral,  "Q DAY, Young Garza M.D.  •  senna-docusate (PERICOLACE or SENOKOT S) 8.6-50 MG per tablet 2 Tab, 2 Tab, Oral, BID **AND** polyethylene glycol/lytes (MIRALAX) PACKET 1 Packet, 1 Packet, Oral, QDAY PRN **AND** magnesium hydroxide (MILK OF MAGNESIA) suspension 30 mL, 30 mL, Oral, QDAY PRN **AND** bisacodyl (DULCOLAX) suppository 10 mg, 10 mg, Rectal, QDAY PRN, Duong Clement M.D.  •  enoxaparin (LOVENOX) inj 40 mg, 40 mg, Subcutaneous, DAILY, Duong Clement M.D., 40 mg at 08/20/19 0519  •  furosemide (LASIX) injection 40 mg, 40 mg, Intravenous, Q DAY, Duong Clement M.D., 40 mg at 08/20/19 0509  •  hydrALAZINE (APRESOLINE) injection 10 mg, 10 mg, Intravenous, Q6HRS PRN, Sandra Nguyen M.D., 10 mg at 08/19/19 2127  •  Respiratory Care per Protocol, , Nebulization, Continuous RT, Sandra Nguyen M.D.  ALLERGIES:   Allergies   Allergen Reactions   • Sulfa Drugs      SURGHX: History reviewed. No pertinent surgical history.  SOCHX:  reports that he has been smoking cigarettes. He has never used smokeless tobacco. He reports that he drank alcohol. He reports that he does not use drugs.  History reviewed. No pertinent family history.     Objective:   BP (!) 208/120   Pulse (!) 112   Temp 37 °C (98.6 °F) (Temporal)   Ht 1.651 m (5' 5\")   Wt (!) 146.1 kg (322 lb)   SpO2 94%   BMI 53.58 kg/m²     Physical Exam   Constitutional: He is oriented to person, place, and time. He appears well-developed and well-nourished. No distress.   HENT:   Head: Normocephalic and atraumatic.   Nose: Nose normal.   Eyes: Pupils are equal, round, and reactive to light. Conjunctivae are normal.   Neck: Normal range of motion. Neck supple. No tracheal deviation present.   Cardiovascular: Normal rate and regular rhythm.   2+ pitting edema bilaterally   Pulmonary/Chest: Effort normal and breath sounds normal. No stridor. No respiratory distress. He has no wheezes.   Exam limited due to body " habitus   Neurological: He is alert and oriented to person, place, and time.   Skin: Skin is warm and dry. Capillary refill takes less than 2 seconds.   Psychiatric: He has a normal mood and affect. His behavior is normal.   Vitals reviewed.    EKG: Sinus tachycardia, LAD, possible old infarct. No previous EKG to compare.       Assessment/Plan:     1. Hypertensive emergency     2. Acute right-sided congestive heart failure (HCC)     3. Bilateral lower extremity edema     4. Tachycardia         The patient is referred to a higher level of care at [facility] now by POV transportation with wife, the pt refused EMS transport, for evaluation and treatment; patient aware of location of [facility]. We discussed risks of non-compliance or delayed medical care. All questions answered to patient’s apparent satisfaction. Patient verbalizes understanding and agreement with plan of care. [facility] was contacted and patient report given to Latoya, Care coordinator.     Please note that this dictation was created using voice recognition software. I have made every reasonable attempt to correct obvious errors, but I expect that there are errors of grammar and possibly content that I did not discover before finalizing the note.

## 2019-08-19 NOTE — ED TRIAGE NOTES
"Chief Complaint   Patient presents with   • Leg Swelling     Bilateral leg swelling x1.5 weeks   • Shortness of Breath     While sitting, lying flat, and with exertion.     BP (!) 212/167   Pulse (!) 123   Temp 36.5 °C (97.7 °F) (Temporal)   Resp 16   Ht 1.651 m (5' 5\")   Wt (!) 144.7 kg (319 lb 0.1 oz)   SpO2 93%   BMI 53.09 kg/m²     Pt brought into triage by WC, EKG completed. VS as above, NAD. Charge aware.  "

## 2019-08-19 NOTE — ED PROVIDER NOTES
ED Provider  Scribed for Leonel Arroyo D.O. by Ge Iqbal. 8/19/2019  4:43 PM    Means of arrival:Wheel chair  History obtained from:Patient  History limited by: None    CHIEF COMPLAINT  Chief Complaint   Patient presents with   • Leg Swelling     Bilateral leg swelling x1.5 weeks   • Shortness of Breath     While sitting, lying flat, and with exertion.       HPI  John Wills is a 39 y.o. male who presents with persistent bilateral leg swelling lasting for the past 1.5 weeks. At home he has been elevating his legs in attempt to decrease his swelling, however without much improvement. He endorses associated increased thirst, productive cough with brown phlegm, orthopnea and dyspnea on exertion. He states that he can only walk about 1 flight of stairs before he will lose his breath. His wife reports that he has been increased in snoring and will oftentimes will stop breathing. He reports normal urination. Denies past medical history, however has a family history of hypertension on this father's side. He has a history of smoking 1-2 packs of 20+ years, however this has decreased to 1-2 cigarettes per day. Denies fever or abdominal pain at this time.     REVIEW OF SYSTEMS  See HPI for further details. All other systems are negative.     PAST MEDICAL HISTORY   has a past medical history of Allergy.    SOCIAL HISTORY  Social History     Tobacco Use   • Smoking status: Current Every Day Smoker     Types: Cigarettes   • Smokeless tobacco: Never Used   Substance and Sexual Activity   • Alcohol use: Not Currently   • Drug use: Never       SURGICAL HISTORY  patient denies any surgical history    CURRENT MEDICATIONS  Home Medications     Reviewed by Meredith Mcnair (Pharmacy Tech) on 08/19/19 at 1649  Med List Status: Complete   Medication Last Dose Status        Patient Mike Taking any Medications                       ALLERGIES  Allergies   Allergen Reactions   • Sulfa Drugs        PHYSICAL EXAM  VITAL SIGNS: BP  "(!) 243/176   Pulse (!) 122   Temp 36.5 °C (97.7 °F) (Temporal)   Resp (!) 22   Ht 1.651 m (5' 5\")   Wt (!) 144.7 kg (319 lb 0.1 oz)   SpO2 93%   BMI 53.09 kg/m²   Constitutional: Alert in no apparent distress.  HENT: No signs of trauma, mucous membranes are moist  Eyes: Conjunctiva normal, Non-icteric.   Neck: Normal range of motion, No tenderness, Supple.  Lymphatic: No lymphadenopathy noted.   Cardiovascular: Tachycardia rate and rhythm, no murmurs. S4  Thorax & Lungs: No respiratory distress, No wheezing, No chest tenderness. Crackles in bases.   Abdomen: Obese, distended, Bowel sounds normal, Soft, No tenderness, No masses, No pulsatile masses. No peritoneal signs.  Skin: Warm, Dry, normal color.   Extremities:  3+ edema going up to bilateral thighs, No tenderness, No cyanosis  Musculoskeletal: Good range of motion in all major joints. No tenderness to palpation or major deformities noted.   Neurologic: Alert and oriented x4, Normal motor function, Normal sensory function, No focal deficits noted.   Psychiatric: Affect normal, Judgment normal, Mood normal.       MEDICAL DECISION MAKING  This is a 39 y.o. male who presents with no cardiac history and was found to be hypotensive, tachycardic.  Is complaining of shortness of breath, with orthopnea, and bilateral lower extremity swelling.  Patient was given Vasotec, and Lasix to treat congestive heart failure, and hypertension.  This did provide improvement of hypertension.    DIAGNOSTIC STUDIES / PROCEDURES    EKG  12 Lead EKG interpreted by me shown below.      LABS  Results for orders placed or performed during the hospital encounter of 08/19/19   CBC with Differential   Result Value Ref Range    WBC 10.8 4.8 - 10.8 K/uL    RBC 6.32 (H) 4.70 - 6.10 M/uL    Hemoglobin 19.3 (H) 14.0 - 18.0 g/dL    Hematocrit 56.6 (H) 42.0 - 52.0 %    MCV 89.6 81.4 - 97.8 fL    MCH 30.5 27.0 - 33.0 pg    MCHC 34.1 33.7 - 35.3 g/dL    RDW 42.4 35.9 - 50.0 fL    Platelet Count " 170 164 - 446 K/uL    MPV 10.8 9.0 - 12.9 fL    Neutrophils-Polys 74.90 (H) 44.00 - 72.00 %    Lymphocytes 16.70 (L) 22.00 - 41.00 %    Monocytes 6.80 0.00 - 13.40 %    Eosinophils 0.10 0.00 - 6.90 %    Basophils 1.10 0.00 - 1.80 %    Immature Granulocytes 0.40 0.00 - 0.90 %    Nucleated RBC 0.00 /100 WBC    Neutrophils (Absolute) 8.12 (H) 1.82 - 7.42 K/uL    Lymphs (Absolute) 1.81 1.00 - 4.80 K/uL    Monos (Absolute) 0.74 0.00 - 0.85 K/uL    Eos (Absolute) 0.01 0.00 - 0.51 K/uL    Baso (Absolute) 0.12 0.00 - 0.12 K/uL    Immature Granulocytes (abs) 0.04 0.00 - 0.11 K/uL    NRBC (Absolute) 0.00 K/uL   Complete Metabolic Panel (CMP)   Result Value Ref Range    Sodium 138 135 - 145 mmol/L    Potassium 3.7 3.6 - 5.5 mmol/L    Chloride 101 96 - 112 mmol/L    Co2 29 20 - 33 mmol/L    Anion Gap 8.0 0.0 - 11.9    Glucose 239 (H) 65 - 99 mg/dL    Bun 17 8 - 22 mg/dL    Creatinine 0.99 0.50 - 1.40 mg/dL    Calcium 9.4 8.5 - 10.5 mg/dL    AST(SGOT) 24 12 - 45 U/L    ALT(SGPT) 26 2 - 50 U/L    Alkaline Phosphatase 80 30 - 99 U/L    Total Bilirubin 1.0 0.1 - 1.5 mg/dL    Albumin 3.6 3.2 - 4.9 g/dL    Total Protein 6.9 6.0 - 8.2 g/dL    Globulin 3.3 1.9 - 3.5 g/dL    A-G Ratio 1.1 g/dL   Troponin   Result Value Ref Range    Troponin T 32 (H) 6 - 19 ng/L   proBrain Natriuretic Peptide, NT   Result Value Ref Range    NT-proBNP 1366 (H) 0 - 125 pg/mL   D-DIMER   Result Value Ref Range    D-Dimer Screen 0.63 (H) 0.00 - 0.50 ug/mL (FEU)   ESTIMATED GFR   Result Value Ref Range    GFR If African American >60 >60 mL/min/1.73 m 2    GFR If Non African American >60 >60 mL/min/1.73 m 2   EKG   Result Value Ref Range    Report       [facility] Emergency Dept.    Test Date:  2019  Pt Name:    KEERTHI CAMPOS                Department: ER  MRN:        [id number]                      Room:  Gender:     Male                         Technician: [id number]  :        1980                   Requested By:ER TRIAGE  PROTOCOL  Order #:    [id number]                    Reading MD: MARYJANE LEON D.O.    Measurements  Intervals                                Axis  Rate:       121                          P:          0  MD:         133                          QRS:        200  QRSD:       86                           T:          57  QT:         336  QTc:        477    Interpretive Statements  SINUS TACHYCARDIA  PROBABLE LEFT ATRIAL ABNORMALITY  RIGHT AXIS DEVIATION  BORDERLINE R WAVE PROGRESSION, ANTERIOR LEADS  BORDERLINE PROLONGED QT INTERVAL  BASELINE WANDER IN LEAD(S) V1,V2  No previous ECG available for comparison    Electronically Signed On 8- 17:44:08 PDT by MARYJANE LEON D.O.        All labs reviewed by me.    RADIOLOGY  DX-CHEST-PORTABLE (1 VIEW)   Final Result      1.  Hypoinflation and pulmonary vascular congestion.   2.  Mild cardiomegaly.   3.  No pneumonia or overt pulmonary edema.        The radiologist's interpretations of all radiological studies have been reviewed by me.        COURSE  Pertinent Labs & Imaging studies reviewed. (See chart for details)    4:02 PM - Patient seen and examined at bedside. Discussed plan of care. Ordered for chest x-ray, D-dimer, CBC, CMP, troponin, proBrain natriuretic peptide, and EKG to evaluate his symptoms.     4:45 PM - Ordered lasix 80 mg and vasotec 2.5 mg.     5:50 PM - Reviewed patient's lab and imaging results.     5:03 PM On recheck, informed patient of chest x-ray results showing fluid and possible CHF. Discussed admission overnight for further testing and monitoring. Patient agrees with plan of care.     5:39 PM I discussed the patient's case and the above findings with [facility] who agrees to admit the patient.      6:08 PM On recheck, the patient's blood pressure is beginning to improve, and his heart rate has mildly improved.    CRITICAL CARE  The very real possibilty of a deterioration of this patient's condition required the highest level of my  preparedness for sudden, emergent intervention.  I provided critical care services, which included medication orders, frequent reevaluations of the patient's condition and response to treatment, ordering and reviewing test results, and discussing the case with various consultants.  The critical care time associated with the care of the patient was 30 minutes. Review chart for interventions. This time is exclusive of any other billable procedures.      I independently evaluated the patient and repeated the important components of the history and physical. I discussed the management with the resident. I have reviewed and agree with the pertinent clinical information as above including history, exam, study findings and recommendations.     DISPOSITION:  Patient will be admitted to [facility] in stable condition.     FINAL IMPRESSION  1. Acute congestive heart failure, unspecified heart failure type (HCC)    2. Hypertensive emergency      Critical care time of 30 minutes.      Ge ARANDA (Baljinder), am scribing for, and in the presence of, Leonel Arroyo D.O..    Electronically signed by: Ge Iqbal (Baljinder), 8/19/2019    Leonel ARANDA D.O. personally performed the services described in this documentation, as scribed by Ge Iqbal in my presence, and it is both accurate and complete. C.     The note accurately reflects work and decisions made by me.  Leonel Arroyo  8/19/2019  7:15 PM

## 2019-08-20 ENCOUNTER — APPOINTMENT (OUTPATIENT)
Dept: CARDIOLOGY | Facility: MEDICAL CENTER | Age: 39
DRG: 291 | End: 2019-08-20
Attending: STUDENT IN AN ORGANIZED HEALTH CARE EDUCATION/TRAINING PROGRAM
Payer: COMMERCIAL

## 2019-08-20 ENCOUNTER — APPOINTMENT (OUTPATIENT)
Dept: RADIOLOGY | Facility: MEDICAL CENTER | Age: 39
DRG: 291 | End: 2019-08-20
Attending: STUDENT IN AN ORGANIZED HEALTH CARE EDUCATION/TRAINING PROGRAM
Payer: COMMERCIAL

## 2019-08-20 PROBLEM — D72.829 LEUKOCYTOSIS: Status: ACTIVE | Noted: 2019-08-20

## 2019-08-20 PROBLEM — D75.1 POLYCYTHEMIA: Status: ACTIVE | Noted: 2019-08-20

## 2019-08-20 PROBLEM — E66.01 MORBID OBESITY (HCC): Status: ACTIVE | Noted: 2019-08-20

## 2019-08-20 PROBLEM — E87.6 HYPOKALEMIA: Status: ACTIVE | Noted: 2019-08-20

## 2019-08-20 PROBLEM — I10 HYPERTENSION: Status: ACTIVE | Noted: 2019-08-20

## 2019-08-20 PROBLEM — I50.9 CONGESTIVE HEART FAILURE (CHF) (HCC): Status: ACTIVE | Noted: 2019-08-20

## 2019-08-20 PROBLEM — R79.89 ELEVATED TROPONIN I LEVEL: Status: ACTIVE | Noted: 2019-08-20

## 2019-08-20 PROBLEM — R73.9 HYPERGLYCEMIA: Status: ACTIVE | Noted: 2019-08-20

## 2019-08-20 LAB
ALBUMIN SERPL BCP-MCNC: 3.5 G/DL (ref 3.2–4.9)
ALBUMIN/GLOB SERPL: 1.1 G/DL
ALP SERPL-CCNC: 63 U/L (ref 30–99)
ALT SERPL-CCNC: 23 U/L (ref 2–50)
AMPHET UR QL SCN: NEGATIVE
ANION GAP SERPL CALC-SCNC: 9 MMOL/L (ref 0–11.9)
ANION GAP SERPL CALC-SCNC: 9 MMOL/L (ref 0–11.9)
APPEARANCE UR: CLEAR
AST SERPL-CCNC: 23 U/L (ref 12–45)
BARBITURATES UR QL SCN: NEGATIVE
BASE EXCESS BLDA CALC-SCNC: 6 MMOL/L (ref -4–3)
BASOPHILS # BLD AUTO: 1 % (ref 0–1.8)
BASOPHILS # BLD: 0.11 K/UL (ref 0–0.12)
BENZODIAZ UR QL SCN: NEGATIVE
BILIRUB SERPL-MCNC: 1.2 MG/DL (ref 0.1–1.5)
BILIRUB UR QL STRIP.AUTO: NEGATIVE
BLD FROM PATIENT VOL: 500 ML
BODY TEMPERATURE: ABNORMAL CENTIGRADE
BP SYS/DIAS--P PHLEBOTOMY: NORMAL MM[HG]
BUN SERPL-MCNC: 16 MG/DL (ref 8–22)
BUN SERPL-MCNC: 17 MG/DL (ref 8–22)
BZE UR QL SCN: NEGATIVE
CALCIUM SERPL-MCNC: 9.2 MG/DL (ref 8.5–10.5)
CALCIUM SERPL-MCNC: 9.2 MG/DL (ref 8.5–10.5)
CANNABINOIDS UR QL SCN: NEGATIVE
CHLORIDE SERPL-SCNC: 100 MMOL/L (ref 96–112)
CHLORIDE SERPL-SCNC: 99 MMOL/L (ref 96–112)
CHOLEST SERPL-MCNC: 184 MG/DL (ref 100–199)
CO2 SERPL-SCNC: 31 MMOL/L (ref 20–33)
CO2 SERPL-SCNC: 31 MMOL/L (ref 20–33)
COLOR UR: YELLOW
CREAT SERPL-MCNC: 0.93 MG/DL (ref 0.5–1.4)
CREAT SERPL-MCNC: 0.97 MG/DL (ref 0.5–1.4)
EKG IMPRESSION: NORMAL
EKG IMPRESSION: NORMAL
EOSINOPHIL # BLD AUTO: 0.01 K/UL (ref 0–0.51)
EOSINOPHIL NFR BLD: 0.1 % (ref 0–6.9)
ERYTHROCYTE [DISTWIDTH] IN BLOOD BY AUTOMATED COUNT: 44.6 FL (ref 35.9–50)
EST. AVERAGE GLUCOSE BLD GHB EST-MCNC: 220 MG/DL
GLOBULIN SER CALC-MCNC: 3.2 G/DL (ref 1.9–3.5)
GLUCOSE BLD-MCNC: 157 MG/DL (ref 65–99)
GLUCOSE BLD-MCNC: 170 MG/DL (ref 65–99)
GLUCOSE BLD-MCNC: 184 MG/DL (ref 65–99)
GLUCOSE BLD-MCNC: 192 MG/DL (ref 65–99)
GLUCOSE SERPL-MCNC: 172 MG/DL (ref 65–99)
GLUCOSE SERPL-MCNC: 188 MG/DL (ref 65–99)
GLUCOSE UR STRIP.AUTO-MCNC: NEGATIVE MG/DL
HBA1C MFR BLD: 9.3 % (ref 0–5.6)
HCO3 BLDA-SCNC: 31 MMOL/L (ref 17–25)
HCT VFR BLD AUTO: 58.7 % (ref 42–52)
HDLC SERPL-MCNC: 32 MG/DL
HGB BLD-MCNC: 18.7 G/DL (ref 14–18)
IMM GRANULOCYTES # BLD AUTO: 0.05 K/UL (ref 0–0.11)
IMM GRANULOCYTES NFR BLD AUTO: 0.4 % (ref 0–0.9)
KETONES UR STRIP.AUTO-MCNC: NEGATIVE MG/DL
LDLC SERPL CALC-MCNC: 99 MG/DL
LEUKOCYTE ESTERASE UR QL STRIP.AUTO: NEGATIVE
LYMPHOCYTES # BLD AUTO: 1.72 K/UL (ref 1–4.8)
LYMPHOCYTES NFR BLD: 15.2 % (ref 22–41)
MAGNESIUM SERPL-MCNC: 1.6 MG/DL (ref 1.5–2.5)
MCH RBC QN AUTO: 29.1 PG (ref 27–33)
MCHC RBC AUTO-ENTMCNC: 31.9 G/DL (ref 33.7–35.3)
MCV RBC AUTO: 91.4 FL (ref 81.4–97.8)
METHADONE UR QL SCN: NEGATIVE
MICRO URNS: NORMAL
MONOCYTES # BLD AUTO: 0.82 K/UL (ref 0–0.85)
MONOCYTES NFR BLD AUTO: 7.2 % (ref 0–13.4)
NEUTROPHILS # BLD AUTO: 8.63 K/UL (ref 1.82–7.42)
NEUTROPHILS NFR BLD: 76.1 % (ref 44–72)
NITRITE UR QL STRIP.AUTO: NEGATIVE
NRBC # BLD AUTO: 0 K/UL
NRBC BLD-RTO: 0 /100 WBC
OPIATES UR QL SCN: NEGATIVE
OXYCODONE UR QL SCN: NEGATIVE
PCO2 BLDA: 44.8 MMHG (ref 26–37)
PCP UR QL SCN: NEGATIVE
PH BLDA: 7.46 [PH] (ref 7.4–7.5)
PH UR STRIP.AUTO: 6 [PH] (ref 5–8)
PHOSPHATE SERPL-MCNC: 4.1 MG/DL (ref 2.5–4.5)
PLATELET # BLD AUTO: 171 K/UL (ref 164–446)
PMV BLD AUTO: 10.7 FL (ref 9–12.9)
PO2 BLDA: 65.1 MMHG (ref 64–87)
POTASSIUM SERPL-SCNC: 3.1 MMOL/L (ref 3.6–5.5)
POTASSIUM SERPL-SCNC: 3.7 MMOL/L (ref 3.6–5.5)
PROPOXYPH UR QL SCN: NEGATIVE
PROT SERPL-MCNC: 6.7 G/DL (ref 6–8.2)
PROT UR QL STRIP: NEGATIVE MG/DL
RBC # BLD AUTO: 6.42 M/UL (ref 4.7–6.1)
RBC UR QL AUTO: NEGATIVE
SAO2 % BLDA: 93.6 % (ref 93–99)
SODIUM SERPL-SCNC: 139 MMOL/L (ref 135–145)
SODIUM SERPL-SCNC: 140 MMOL/L (ref 135–145)
SP GR UR STRIP.AUTO: 1.01
THERAPEUTIC PHLEB 1539: NORMAL
TRIGL SERPL-MCNC: 263 MG/DL (ref 0–149)
TROPONIN T SERPL-MCNC: 28 NG/L (ref 6–19)
TROPONIN T SERPL-MCNC: 31 NG/L (ref 6–19)
TSH SERPL DL<=0.005 MIU/L-ACNC: 2.63 UIU/ML (ref 0.38–5.33)
UROBILINOGEN UR STRIP.AUTO-MCNC: 1 MG/DL
WBC # BLD AUTO: 11.3 K/UL (ref 4.8–10.8)

## 2019-08-20 PROCEDURE — 770020 HCHG ROOM/CARE - TELE (206)

## 2019-08-20 PROCEDURE — 83835 ASSAY OF METANEPHRINES: CPT

## 2019-08-20 PROCEDURE — A9270 NON-COVERED ITEM OR SERVICE: HCPCS | Performed by: STUDENT IN AN ORGANIZED HEALTH CARE EDUCATION/TRAINING PROGRAM

## 2019-08-20 PROCEDURE — 80053 COMPREHEN METABOLIC PANEL: CPT

## 2019-08-20 PROCEDURE — 83735 ASSAY OF MAGNESIUM: CPT

## 2019-08-20 PROCEDURE — 99195 PHLEBOTOMY: CPT | Mod: 91

## 2019-08-20 PROCEDURE — 84484 ASSAY OF TROPONIN QUANT: CPT

## 2019-08-20 PROCEDURE — 81003 URINALYSIS AUTO W/O SCOPE: CPT

## 2019-08-20 PROCEDURE — 700102 HCHG RX REV CODE 250 W/ 637 OVERRIDE(OP): Performed by: STUDENT IN AN ORGANIZED HEALTH CARE EDUCATION/TRAINING PROGRAM

## 2019-08-20 PROCEDURE — 36415 COLL VENOUS BLD VENIPUNCTURE: CPT

## 2019-08-20 PROCEDURE — 82803 BLOOD GASES ANY COMBINATION: CPT

## 2019-08-20 PROCEDURE — 700102 HCHG RX REV CODE 250 W/ 637 OVERRIDE(OP): Performed by: INTERNAL MEDICINE

## 2019-08-20 PROCEDURE — 83036 HEMOGLOBIN GLYCOSYLATED A1C: CPT

## 2019-08-20 PROCEDURE — 85025 COMPLETE CBC W/AUTO DIFF WBC: CPT

## 2019-08-20 PROCEDURE — 80307 DRUG TEST PRSMV CHEM ANLYZR: CPT

## 2019-08-20 PROCEDURE — 84100 ASSAY OF PHOSPHORUS: CPT

## 2019-08-20 PROCEDURE — 99233 SBSQ HOSP IP/OBS HIGH 50: CPT | Mod: GC | Performed by: INTERNAL MEDICINE

## 2019-08-20 PROCEDURE — 80061 LIPID PANEL: CPT

## 2019-08-20 PROCEDURE — 93970 EXTREMITY STUDY: CPT

## 2019-08-20 PROCEDURE — 93005 ELECTROCARDIOGRAM TRACING: CPT | Performed by: STUDENT IN AN ORGANIZED HEALTH CARE EDUCATION/TRAINING PROGRAM

## 2019-08-20 PROCEDURE — 80048 BASIC METABOLIC PNL TOTAL CA: CPT

## 2019-08-20 PROCEDURE — 93010 ELECTROCARDIOGRAM REPORT: CPT | Performed by: INTERNAL MEDICINE

## 2019-08-20 PROCEDURE — 82962 GLUCOSE BLOOD TEST: CPT | Mod: 91

## 2019-08-20 PROCEDURE — 700111 HCHG RX REV CODE 636 W/ 250 OVERRIDE (IP): Performed by: STUDENT IN AN ORGANIZED HEALTH CARE EDUCATION/TRAINING PROGRAM

## 2019-08-20 PROCEDURE — 82668 ASSAY OF ERYTHROPOIETIN: CPT

## 2019-08-20 PROCEDURE — 82088 ASSAY OF ALDOSTERONE: CPT

## 2019-08-20 PROCEDURE — 84443 ASSAY THYROID STIM HORMONE: CPT

## 2019-08-20 RX ORDER — LISINOPRIL 20 MG/1
20 TABLET ORAL
Status: DISCONTINUED | OUTPATIENT
Start: 2019-08-21 | End: 2019-08-21 | Stop reason: HOSPADM

## 2019-08-20 RX ORDER — POTASSIUM CHLORIDE 20 MEQ/1
40 TABLET, EXTENDED RELEASE ORAL DAILY
Status: DISCONTINUED | OUTPATIENT
Start: 2019-08-20 | End: 2019-08-20

## 2019-08-20 RX ORDER — POTASSIUM CHLORIDE 20 MEQ/1
40 TABLET, EXTENDED RELEASE ORAL ONCE
Status: COMPLETED | OUTPATIENT
Start: 2019-08-20 | End: 2019-08-20

## 2019-08-20 RX ORDER — CHLORTHALIDONE 25 MG/1
25 TABLET ORAL
Status: DISCONTINUED | OUTPATIENT
Start: 2019-08-20 | End: 2019-08-21 | Stop reason: HOSPADM

## 2019-08-20 RX ORDER — SPIRONOLACTONE 25 MG/1
25 TABLET ORAL
Status: DISCONTINUED | OUTPATIENT
Start: 2019-08-20 | End: 2019-08-21 | Stop reason: HOSPADM

## 2019-08-20 RX ORDER — POTASSIUM CHLORIDE 20 MEQ/1
80 TABLET, EXTENDED RELEASE ORAL DAILY
Status: DISCONTINUED | OUTPATIENT
Start: 2019-08-21 | End: 2019-08-20

## 2019-08-20 RX ORDER — METOPROLOL TARTRATE 50 MG/1
50 TABLET, FILM COATED ORAL TWICE DAILY
Status: DISCONTINUED | OUTPATIENT
Start: 2019-08-20 | End: 2019-08-21 | Stop reason: HOSPADM

## 2019-08-20 RX ORDER — FUROSEMIDE 10 MG/ML
40 INJECTION INTRAMUSCULAR; INTRAVENOUS
Status: DISCONTINUED | OUTPATIENT
Start: 2019-08-21 | End: 2019-08-21

## 2019-08-20 RX ORDER — ATORVASTATIN CALCIUM 20 MG/1
20 TABLET, FILM COATED ORAL EVERY EVENING
Status: DISCONTINUED | OUTPATIENT
Start: 2019-08-20 | End: 2019-08-21 | Stop reason: HOSPADM

## 2019-08-20 RX ORDER — POTASSIUM CHLORIDE 20 MEQ/1
20 TABLET, EXTENDED RELEASE ORAL 2 TIMES DAILY
Status: COMPLETED | OUTPATIENT
Start: 2019-08-20 | End: 2019-08-21

## 2019-08-20 RX ADMIN — INSULIN LISPRO 1 UNITS: 100 INJECTION, SOLUTION INTRAVENOUS; SUBCUTANEOUS at 13:15

## 2019-08-20 RX ADMIN — POTASSIUM CHLORIDE 40 MEQ: 20 TABLET, EXTENDED RELEASE ORAL at 08:52

## 2019-08-20 RX ADMIN — CHLORTHALIDONE 25 MG: 25 TABLET ORAL at 01:20

## 2019-08-20 RX ADMIN — ENOXAPARIN SODIUM 40 MG: 100 INJECTION SUBCUTANEOUS at 05:19

## 2019-08-20 RX ADMIN — POTASSIUM CHLORIDE 20 MEQ: 20 TABLET, EXTENDED RELEASE ORAL at 17:35

## 2019-08-20 RX ADMIN — ATORVASTATIN CALCIUM 20 MG: 20 TABLET, FILM COATED ORAL at 17:35

## 2019-08-20 RX ADMIN — INSULIN LISPRO 1 UNITS: 100 INJECTION, SOLUTION INTRAVENOUS; SUBCUTANEOUS at 05:17

## 2019-08-20 RX ADMIN — CHLORTHALIDONE 25 MG: 25 TABLET ORAL at 17:36

## 2019-08-20 RX ADMIN — Medication 400 MG: at 17:35

## 2019-08-20 RX ADMIN — SPIRONOLACTONE 25 MG: 25 TABLET ORAL at 15:43

## 2019-08-20 RX ADMIN — POTASSIUM CHLORIDE 40 MEQ: 20 TABLET, EXTENDED RELEASE ORAL at 05:10

## 2019-08-20 RX ADMIN — INSULIN LISPRO 1 UNITS: 100 INJECTION, SOLUTION INTRAVENOUS; SUBCUTANEOUS at 18:01

## 2019-08-20 RX ADMIN — METOPROLOL TARTRATE 50 MG: 50 TABLET ORAL at 12:41

## 2019-08-20 RX ADMIN — FUROSEMIDE 40 MG: 10 INJECTION, SOLUTION INTRAMUSCULAR; INTRAVENOUS at 05:09

## 2019-08-20 ASSESSMENT — COGNITIVE AND FUNCTIONAL STATUS - GENERAL
DAILY ACTIVITIY SCORE: 24
MOBILITY SCORE: 24
SUGGESTED CMS G CODE MODIFIER DAILY ACTIVITY: CH
SUGGESTED CMS G CODE MODIFIER MOBILITY: CH

## 2019-08-20 ASSESSMENT — LIFESTYLE VARIABLES
EVER_SMOKED: YES
TOTAL SCORE: 0
CONSUMPTION TOTAL: INCOMPLETE
EVER HAD A DRINK FIRST THING IN THE MORNING TO STEADY YOUR NERVES TO GET RID OF A HANGOVER: NO
HOW MANY TIMES IN THE PAST YEAR HAVE YOU HAD 5 OR MORE DRINKS IN A DAY: 0
ON A TYPICAL DAY WHEN YOU DRINK ALCOHOL HOW MANY DRINKS DO YOU HAVE: 0
ALCOHOL_USE: NO
CONSUMPTION TOTAL: INCOMPLETE
HAVE YOU EVER FELT YOU SHOULD CUT DOWN ON YOUR DRINKING: NO
TOTAL SCORE: 0
HAVE PEOPLE ANNOYED YOU BY CRITICIZING YOUR DRINKING: NO
AVERAGE NUMBER OF DAYS PER WEEK YOU HAVE A DRINK CONTAINING ALCOHOL: 0
ALCOHOL_USE: NO
TOTAL SCORE: 0
EVER FELT BAD OR GUILTY ABOUT YOUR DRINKING: NO

## 2019-08-20 ASSESSMENT — ENCOUNTER SYMPTOMS
VOMITING: 0
HEARTBURN: 0
COUGH: 0
NAUSEA: 0
BLURRED VISION: 0
PALPITATIONS: 0
PHOTOPHOBIA: 0
DIZZINESS: 0
FALLS: 0
HEMOPTYSIS: 0
INSOMNIA: 0
MEMORY LOSS: 0
BACK PAIN: 0
HEADACHES: 0
CHILLS: 0
DEPRESSION: 0
LOSS OF CONSCIOUSNESS: 0
FEVER: 0

## 2019-08-20 NOTE — PROGRESS NOTES
Dr. Clement notified of bp 173/118 after prn hydralazine and scheduled lisinopril given. Per MD, recheck blood pressure in 2-3 hours and if SBP maintains 173 or higher, marie TOLEDO.

## 2019-08-20 NOTE — PROGRESS NOTES
2 RN skin check complete with HUANG Lehman.  Devices in place o2 nasal cannula, tele box, RUE PIV.  Skin assessed under devices yes.  Confirmed pressure ulcers found on n/a.  New potential pressure ulcers noted on n/a    The following interventions in place: patient independent and ambulatory, low airloss mattress, pillows for support.    Skin intact except for the following:  L abdominal bruise  BLE edema, redness, slight weeping from calves.

## 2019-08-20 NOTE — DIETARY
Nutrition Services: Update   Day 1 of admit.  John Wills is a 39 y.o. male with admitting DX of Heart failure (HCC)    Pt is currently on a Cardiac diet. Wt 142 kg via standing scale.    Received consult for diabetes education, per H&P pt with no prior history of DM. Noted with hyperglycemia with admit but awaiting HgbA1c results. Will reassess needs for education based on affirmative diagnosis of DM.     BMI - Pt with BMI >40 (=Body mass index is 52.09 kg/m².), morbid obesity. Weight loss counseling not appropriate in acute care setting.     RECOMMEND - Referral to outpatient nutrition services for weight management after D/C.     Please consult RD prn.

## 2019-08-20 NOTE — CARE PLAN
Problem: Communication  Goal: The ability to communicate needs accurately and effectively will improve  Outcome: PROGRESSING AS EXPECTED     Problem: Safety  Goal: Will remain free from injury  Outcome: PROGRESSING AS EXPECTED  Goal: Will remain free from falls  Outcome: PROGRESSING AS EXPECTED     Problem: Infection  Goal: Will remain free from infection  Outcome: PROGRESSING AS EXPECTED     Problem: Venous Thromboembolism (VTW)/Deep Vein Thrombosis (DVT) Prevention:  Goal: Patient will participate in Venous Thrombosis (VTE)/Deep Vein Thrombosis (DVT)Prevention Measures  Outcome: PROGRESSING AS EXPECTED     Problem: Bowel/Gastric:  Goal: Normal bowel function is maintained or improved  Outcome: PROGRESSING AS EXPECTED  Goal: Will not experience complications related to bowel motility  Outcome: PROGRESSING AS EXPECTED     Problem: Knowledge Deficit  Goal: Knowledge of disease process/condition, treatment plan, diagnostic tests, and medications will improve  Outcome: PROGRESSING AS EXPECTED  Goal: Knowledge of the prescribed therapeutic regimen will improve  Outcome: PROGRESSING AS EXPECTED     Problem: Discharge Barriers/Planning  Goal: Patient's continuum of care needs will be met  Outcome: PROGRESSING AS EXPECTED     Problem: Fluid Volume:  Goal: Will maintain balanced intake and output  Outcome: PROGRESSING AS EXPECTED     Problem: Respiratory:  Goal: Respiratory status will improve  Outcome: PROGRESSING SLOWER THAN EXPECTED

## 2019-08-20 NOTE — ASSESSMENT & PLAN NOTE
Current blood pressure 140/103.  Pt not on any home anti-hypertensive medication.   TSH 2.63    Plan  - continue lisinopril, chlorthalidone  - lasix mostly for diuresis  -if not responding will evaluate for secondary causes of hypertension.

## 2019-08-20 NOTE — ASSESSMENT & PLAN NOTE
BMI>50, STOP BANG at least 6, with CHF on EKG showing right ventricular hypertrophy with right axis deviation.   Most likely pickwickian syndrome, KATHY    Plan  - about weight loss, may consider bariatric surgery if cause of heart failure is due to extreme weight.   -Monitor O2 sat via pulse ox raisa when sleeping.   -Follow up with PCP for sleep study referral and possible surgery.

## 2019-08-20 NOTE — PROGRESS NOTES
Assumed care of patient from ED HUANG Carmona and transported to [facility]. A&O x4. ST On Tele. 2L O2 normally room air at home. Accompanied by wife. Resting comfortably in bed. Call light within reach. Bed locked and in lowest position. Patient informed on hourly rounding throughout shift. Denies needs at this time. Hypertensive. Will give scheduled and prn medications.    MD paged regarding 15u lantus ordered. fsbg 150 patient without diabetes diagnosis at this time. Per MD, order d/c'd and do not give dose this evening. Will continue with ssi only.

## 2019-08-20 NOTE — ASSESSMENT & PLAN NOTE
Hct 58.7; likely due to morbid obesity leading to possible KATHY. Will check EPO if low then test for JUANY 2 mutation  Plan  - EPO  -phlebotomy 500 cc goal is hCT<45  - F/U outpatient pcp for sleep apnea testing

## 2019-08-20 NOTE — SENIOR ADMIT NOTE
Senior Admission Note    In summary: John Wills is a 39 y.o. male with no past medical history  presented to the ER with worsening LE edema and Shortness of breath.     Patient reported that for the past 2 weeks he has noticed worsening LE edema. He had tried to elevated legs and walk more with minimal improved in symptoms. LE edema is associated with shortness of breath, orthopnea and increased thirst. Patient reported that he can't walk a flight of stairs without being short of breath. Wife reported that they believe he has KATHY, but has not being evaluated for it. Patient snores and wife has noted patient stops breathing at night. Denies chest pain, fever, chills, recent infections, n/v. Reported palpitations today before presenting to ED     Patient had smoking history but quit 10 years ago.     Assessment and plan in summary:    #Heart Failure    - patient presented with worsening LE edema and shortness of breath    - JVD difficult to assess due to body habitus    - proBNP 1366   - troponin 32   - received IV lasix 80mg in ED   #Hypertensive urgency    - BP elevated, no evidence of end organ damage    - patient not on medications at home   #Hyperglycemia    - blood glucose 200s    - no history of DM   #Polycythemia    - Hbg 19    - most likely related to KATHY - not diagnose      Plan   - admitted to telemetry  - ECHO   - trend troponin   - IV lasix 40mg daily   - I&O and daily weights   - started lisinopril   - PRN medication for BP   - fluid restrictions   - A1c   - lipid profile   - RT protocol   - recommend sleep study as outpatient     For full plan, please see Intern note for details   Sandra Nguyen M.D.  PGY 3

## 2019-08-20 NOTE — ED NOTES
Per dayshift, RN on floor unable to take report. Nightshift RN notified that pt is ready for transfer

## 2019-08-20 NOTE — ASSESSMENT & PLAN NOTE
Troponin 28. Less likely due to MI,ECG not showing ST-T and T wave changes, no LBBB.  Troponin likely due to-demand ischemia.     Plan   echocardiogram pending

## 2019-08-20 NOTE — ASSESSMENT & PLAN NOTE
High BMI, with waist >35 inches. HbA1c is 9.3, triglycerides 233, HTN    Plan  - diet education  - lipitor  - insulin Humalog and monitor blood sugars.  -  about weight loss

## 2019-08-20 NOTE — ASSESSMENT & PLAN NOTE
Potassium 4.2 now.  Hypokalemia probably due to diuresis.  Plan  - Kdur bid, in view of on going lasix  -On potassium sparing diuretic- spironolactone  -Continue to monitor

## 2019-08-20 NOTE — ASSESSMENT & PLAN NOTE
Likely this is hypertensive cardiomyopathy as pt has uncontrollable HTN combined with morbid obesity.   Urine drug screen negative  Orthopnea improved.  Patient stable no overnight complaints and would like to go home for outpatient evaluation and follow-up    Plan   - ECHO pending   - lopressor 50 mg bid  - lasix40 Qd  - spironolactone 25 mg Q day  - chlorthalidone 25 mg Q day  - Replace electrolytes adequately.  -  statin .   - cardiac diet, restrict water intake to 1.5 L, no added salt to diet

## 2019-08-20 NOTE — PROGRESS NOTES
Internal Medicine Admitting History and Physical    Note Author: Duong Clement M.D.       Name John Wills     1980   Age/Sex 39 y.o. male   MRN [id number]   Code Status Full     After 5PM or if no immediate response to page, please call for cross-coverage  Attending/Team: Dr King/Delio See Patient List for primary contact information  Call [phone] to page    1st Call - Day Intern (R1):   Dr. Anderson 2nd Call - Day Sr. Resident (R2/R3):   Dr Garza       Chief Complaint:   Leg swelling   Dyspnea    HPI:  Johann is a 38 y/o M w/ no known PMHx of heart disease, hx of uncontrolled HTN admitted here for worsening leg pain and dyspnea.    Pt states 1.5 wks ago, pt experienced b/l leg swelling with more swelling on his right than left. Pt then was told to drink water and to raise leg to improve leg edema. Pt drank more water than usual. Pt was then peeing more frequently. Pt also noticed weight gained of 10lbs within a day. Prior to the leg swelling pt was already feeling worsening dyspnea of exertion. Pt now feels dyspeanic at 1 flight of stairs. Pt denies dyspnea when walking on flat ground and can walk further. Pt has hx of smoking, pt quit at least 10 years ago. Since pt has now worsening leg swelling, with orthopnea, PND and worsening dyspnea on exertion pt went to the ED.    Pt's initial ED VS was 212/167 P 123, T36.5 and SpO2 93% on 2L NC. Pt's WBC 10.8, Na 138, K 3.7,crea is 0.99 Troponin of 32 with NT-pro BNP 1366, no transaminitis. CXR showed cardiomegaly with pulmonary vascular congestion and mild b/l pleural effusion. Pt's EKG showed  Sinus tachycardia,  R ventricular hypertrophy, AVR is positive indicating Right axis deviationST-T wave elevation. No pathologic qwave, no notched pwave seen.      Pt was started on 80mg furosemide for dirurectis, and had UOP of net 2800. Initial admission weight is 142kg,  And dry weight per 2015 note was 136.079 kg.     On bedside  interview, pt still has tachycardia and hypertensive. Pt denies chest pain, palpitations, fever, chills and URI in the last 1.5 weeks, States doesn't have salt restriction diet, denies hx of heart disease in the past.         Review of Systems   Constitutional: Negative.    HENT: Negative.    Eyes: Negative.    Respiratory: Negative.    Cardiovascular: Positive for orthopnea, leg swelling and PND. Negative for chest pain and palpitations.   Gastrointestinal: Negative.  Negative for blood in stool and constipation.   Genitourinary: Negative.    Musculoskeletal: Negative.    Skin: Negative.    Neurological: Negative.    Endo/Heme/Allergies: Negative.              Past Medical History (Chronic medical problem, known complications and current treatment)    Per pt.   KATHY  Hx of Tobacco use D/o      Past Surgical History:  History reviewed. No pertinent surgical history.    Current Outpatient Medications:  Home Medications       Reviewed by Mervat Cheng R.N. (Registered Nurse) on 08/20/19 at 0131  Med List Status: Complete     Medication Last Dose Status        Patient Mike Taking any Medications                           Medication Allergy/Sensitivities:  Allergies   Allergen Reactions    Sulfa Drugs          Family History (mandatory)   History reviewed. No pertinent family history.    Social History (mandatory)   Social History     Socioeconomic History    Marital status:      Spouse name: Not on file    Number of children: Not on file    Years of education: Not on file    Highest education level: Not on file   Occupational History    Not on file   Social Needs    Financial resource strain: Not on file    Food insecurity:     Worry: Not on file     Inability: Not on file    Transportation needs:     Medical: Not on file     Non-medical: Not on file   Tobacco Use    Smoking status: Current Every Day Smoker     Types: Cigarettes    Smokeless tobacco: Never Used   Substance and Sexual Activity    Alcohol use:  Not Currently    Drug use: Never    Sexual activity: Not on file   Lifestyle    Physical activity:     Days per week: Not on file     Minutes per session: Not on file    Stress: Not on file   Relationships    Social connections:     Talks on phone: Not on file     Gets together: Not on file     Attends [employer] service: Not on file     Active member of club or organization: Not on file     Attends meetings of clubs or organizations: Not on file     Relationship status: Not on file    Intimate partner violence:     Fear of current or ex partner: Not on file     Emotionally abused: Not on file     Physically abused: Not on file     Forced sexual activity: Not on file   Other Topics Concern    Not on file   Social History Narrative    Not on file       PCP : Pcp Pt States None    Physical Exam     Vitals:    08/19/19 2211 08/20/19 0058 08/20/19 0222 08/20/19 0339   BP: (!) 173/118 (!) 178/106 (!) 164/114 (!) 164/103   Pulse: (!) 104 (!) 103 99 100   Resp:  20  20   Temp:  36.9 °C (98.5 °F)  36.8 °C (98.3 °F)   TempSrc:  Temporal  Temporal   SpO2:  96% 95% 95%   Weight:       Height:         Body mass index is 52.09 kg/m².  O2 therapy: Pulse Oximetry: 95 %, O2 (LPM): 2.5, O2 Delivery: Silicone Nasal Cannula    Physical Exam   Constitutional: He is oriented to person, place, and time.   Appears non stressed. Not in acute distress.    HENT:   MP score 4, mucosa moist and non-erythemathous.    Cardiovascular:   Tachycardic with regular rhythm, intact radial pulse. No rubs, no gallops appreciated, no murmur, heart sound distant for me to hear raisa when pt was lying down. JVD noticed, b/l edema +2 up to calf bilaterally, weeping   Pulmonary/Chest:   Normal respiratory effort, no costal rib retraction, able to finish sentences, no wheezing, no rales, mild crackles on b/l base.     Abdominal:   Soft, non-distended, no ascities appreciated. No stigmata of liver disease. No t/r/g   Musculoskeletal: Normal range of motion. He  exhibits edema and tenderness.   B/l leg edema +2 with mild tenderness when really pressed hard.    Neurological: He is alert and oriented to person, place, and time.   Skin: Skin is warm. No rash noted. There is erythema. No pallor.   Psychiatric: Mood, affect and judgment normal.         Data Review       Old Records Request:   Completed  Current Records review/summary: Completed    Lab Data Review:  Recent Results (from the past 24 hour(s))   EKG    Collection Time: 19  3:51 PM   Result Value Ref Range    Report       [facility]    Test Date:  2019  Pt Name:    KEERTHI CAMPOS                Department: ER  MRN:        [id number]                      Room:  Gender:     Male                         Technician: [id number]  :        1980                   Requested By:ER TRIAGE PROTOCOL  Order #:    [id number]                    Reading MD: MARYJANE LEON D.O.    Measurements  Intervals                                Axis  Rate:       121                          P:          0  VA:         133                          QRS:        200  QRSD:       86                           T:          57  QT:         336  QTc:        477    Interpretive Statements  SINUS TACHYCARDIA  PROBABLE LEFT ATRIAL ABNORMALITY  RIGHT AXIS DEVIATION  BORDERLINE R WAVE PROGRESSION, ANTERIOR LEADS  BORDERLINE PROLONGED QT INTERVAL  BASELINE WANDER IN LEAD(S) V1,V2  No previous ECG available for comparison    Electronically Signed On 2019 17:44:08 PDT by MARYJANE LEON D.O.     CBC with Differential    Collection Time: 19  4:30 PM   Result Value Ref Range    WBC 10.8 4.8 - 10.8 K/uL    RBC 6.32 (H) 4.70 - 6.10 M/uL    Hemoglobin 19.3 (H) 14.0 - 18.0 g/dL    Hematocrit 56.6 (H) 42.0 - 52.0 %    MCV 89.6 81.4 - 97.8 fL    MCH 30.5 27.0 - 33.0 pg    MCHC 34.1 33.7 - 35.3 g/dL    RDW 42.4 35.9 - 50.0 fL    Platelet Count 170 164 - 446 K/uL    MPV 10.8 9.0 - 12.9 fL    Neutrophils-Polys  74.90 (H) 44.00 - 72.00 %    Lymphocytes 16.70 (L) 22.00 - 41.00 %    Monocytes 6.80 0.00 - 13.40 %    Eosinophils 0.10 0.00 - 6.90 %    Basophils 1.10 0.00 - 1.80 %    Immature Granulocytes 0.40 0.00 - 0.90 %    Nucleated RBC 0.00 /100 WBC    Neutrophils (Absolute) 8.12 (H) 1.82 - 7.42 K/uL    Lymphs (Absolute) 1.81 1.00 - 4.80 K/uL    Monos (Absolute) 0.74 0.00 - 0.85 K/uL    Eos (Absolute) 0.01 0.00 - 0.51 K/uL    Baso (Absolute) 0.12 0.00 - 0.12 K/uL    Immature Granulocytes (abs) 0.04 0.00 - 0.11 K/uL    NRBC (Absolute) 0.00 K/uL   Complete Metabolic Panel (CMP)    Collection Time: 08/19/19  4:30 PM   Result Value Ref Range    Sodium 138 135 - 145 mmol/L    Potassium 3.7 3.6 - 5.5 mmol/L    Chloride 101 96 - 112 mmol/L    Co2 29 20 - 33 mmol/L    Anion Gap 8.0 0.0 - 11.9    Glucose 239 (H) 65 - 99 mg/dL    Bun 17 8 - 22 mg/dL    Creatinine 0.99 0.50 - 1.40 mg/dL    Calcium 9.4 8.5 - 10.5 mg/dL    AST(SGOT) 24 12 - 45 U/L    ALT(SGPT) 26 2 - 50 U/L    Alkaline Phosphatase 80 30 - 99 U/L    Total Bilirubin 1.0 0.1 - 1.5 mg/dL    Albumin 3.6 3.2 - 4.9 g/dL    Total Protein 6.9 6.0 - 8.2 g/dL    Globulin 3.3 1.9 - 3.5 g/dL    A-G Ratio 1.1 g/dL   Troponin    Collection Time: 08/19/19  4:30 PM   Result Value Ref Range    Troponin T 32 (H) 6 - 19 ng/L   proBrain Natriuretic Peptide, NT    Collection Time: 08/19/19  4:30 PM   Result Value Ref Range    NT-proBNP 1366 (H) 0 - 125 pg/mL   D-DIMER    Collection Time: 08/19/19  4:30 PM   Result Value Ref Range    D-Dimer Screen 0.63 (H) 0.00 - 0.50 ug/mL (FEU)   ESTIMATED GFR    Collection Time: 08/19/19  4:30 PM   Result Value Ref Range    GFR If African American >60 >60 mL/min/1.73 m 2    GFR If Non African American >60 >60 mL/min/1.73 m 2   ACCU-CHEK GLUCOSE    Collection Time: 08/19/19  9:33 PM   Result Value Ref Range    Glucose - Accu-Ck 150 (H) 65 - 99 mg/dL   TROPONIN    Collection Time: 08/19/19 10:32 PM   Result Value Ref Range    Troponin T 29 (H) 6 - 19 ng/L    ACCU-CHEK GLUCOSE    Collection Time: 08/19/19 11:54 PM   Result Value Ref Range    Glucose - Accu-Ck 184 (H) 65 - 99 mg/dL   URINE DRUG SCREEN    Collection Time: 08/20/19  1:08 AM   Result Value Ref Range    Amphetamines Urine Negative Negative    Barbiturates Negative Negative    Benzodiazepines Negative Negative    Cocaine Metabolite Negative Negative    Methadone Negative Negative    Opiates Negative Negative    Oxycodone Negative Negative    Phencyclidine -Pcp Negative Negative    Propoxyphene Negative Negative    Cannabinoid Metab Negative Negative   TROPONIN    Collection Time: 08/20/19  2:06 AM   Result Value Ref Range    Troponin T 28 (H) 6 - 19 ng/L   Lipid Profile (Lipid Panel) Fasting    Collection Time: 08/20/19  2:06 AM   Result Value Ref Range    Cholesterol,Tot 184 100 - 199 mg/dL    Triglycerides 263 (H) 0 - 149 mg/dL    HDL 32 (A) >=40 mg/dL    LDL 99 <100 mg/dL   Comp Metabolic Panel (CMP)    Collection Time: 08/20/19  2:06 AM   Result Value Ref Range    Sodium 139 135 - 145 mmol/L    Potassium 3.1 (L) 3.6 - 5.5 mmol/L    Chloride 99 96 - 112 mmol/L    Co2 31 20 - 33 mmol/L    Anion Gap 9.0 0.0 - 11.9    Glucose 188 (H) 65 - 99 mg/dL    Bun 17 8 - 22 mg/dL    Creatinine 0.93 0.50 - 1.40 mg/dL    Calcium 9.2 8.5 - 10.5 mg/dL    AST(SGOT) 23 12 - 45 U/L    ALT(SGPT) 23 2 - 50 U/L    Alkaline Phosphatase 63 30 - 99 U/L    Total Bilirubin 1.2 0.1 - 1.5 mg/dL    Albumin 3.5 3.2 - 4.9 g/dL    Total Protein 6.7 6.0 - 8.2 g/dL    Globulin 3.2 1.9 - 3.5 g/dL    A-G Ratio 1.1 g/dL   CBC with Differential    Collection Time: 08/20/19  2:06 AM   Result Value Ref Range    WBC 11.3 (H) 4.8 - 10.8 K/uL    RBC 6.42 (H) 4.70 - 6.10 M/uL    Hemoglobin 18.7 (H) 14.0 - 18.0 g/dL    Hematocrit 58.7 (H) 42.0 - 52.0 %    MCV 91.4 81.4 - 97.8 fL    MCH 29.1 27.0 - 33.0 pg    MCHC 31.9 (L) 33.7 - 35.3 g/dL    RDW 44.6 35.9 - 50.0 fL    Platelet Count 171 164 - 446 K/uL    MPV 10.7 9.0 - 12.9 fL    Neutrophils-Polys  76.10 (H) 44.00 - 72.00 %    Lymphocytes 15.20 (L) 22.00 - 41.00 %    Monocytes 7.20 0.00 - 13.40 %    Eosinophils 0.10 0.00 - 6.90 %    Basophils 1.00 0.00 - 1.80 %    Immature Granulocytes 0.40 0.00 - 0.90 %    Nucleated RBC 0.00 /100 WBC    Neutrophils (Absolute) 8.63 (H) 1.82 - 7.42 K/uL    Lymphs (Absolute) 1.72 1.00 - 4.80 K/uL    Monos (Absolute) 0.82 0.00 - 0.85 K/uL    Eos (Absolute) 0.01 0.00 - 0.51 K/uL    Baso (Absolute) 0.11 0.00 - 0.12 K/uL    Immature Granulocytes (abs) 0.05 0.00 - 0.11 K/uL    NRBC (Absolute) 0.00 K/uL   TSH WITH REFLEX TO FT4    Collection Time: 08/20/19  2:06 AM   Result Value Ref Range    TSH 2.630 0.380 - 5.330 uIU/mL   ESTIMATED GFR    Collection Time: 08/20/19  2:06 AM   Result Value Ref Range    GFR If African American >60 >60 mL/min/1.73 m 2    GFR If Non African American >60 >60 mL/min/1.73 m 2       Imaging/Procedures Review:    Independant Imaging Review: Completed  DX-CHEST-PORTABLE (1 VIEW)   Final Result      1.  Hypoinflation and pulmonary vascular congestion.   2.  Mild cardiomegaly.   3.  No pneumonia or overt pulmonary edema.      EC-ECHOCARDIOGRAM COMPLETE W/O CONT    (Results Pending)            EKG:   EKG Independent Review: Completed  QTc:477, HR: 121, Sinus Tachycardia, ST/T changes,   R ventricular hypertrophy, AVR is positive indicating Right axis deviationST-T wave elevation. No pathologic qwave, no notched pwave seen.       Records reviewed and summarized in current documentation :  Yes  [employer] teaching service handout given to patient:  Yes         Assessment/Plan     Exacerbation of Congestive heart failure (CHF) (HCC)  Assessment & Plan  Pt's weight up to more than 10 lbs, w/ b/l leg edema, orthopnea, PND and CXR showing cardiomegaly w/ pulmonary congestion and mild b/l pleural effusion with elevated BNP all points out to exacerbation of pt's congestive heart failure. Pt worsened his heart failure by drinking free water and not restricting his salt intake.  Likely this is hypertensive cardiomyopathy as pt has uncontrollable HTN combined with morbid obesity.        Plan   Would diurese pt with 40 mg furosemide. Target urine output is 1.5-2.0L of urine.   Would start on 10 mg lisinopril to improve afterload control/improve HTN will be using hydralazine and chlorthalidone  Would 1.5 to 2L fluid restrict pt and decrease salt intake to improve pre-load.   Would put pt on beta-blocker once hemodynamics is stable.   Will start on aspirin and statin .     Diagnostics:  Will order TTE   Will order Utox  Will order TSH          Hypertension  Assessment & Plan  Current blood pressure 164/114. Improved on hydralazine. Pt not on any home anti-hypertensive medication. This is likely due to higher RAAS activation 2/2 heart failure or other factors should be considered.    Plan  Workup for causes of 2ndary hypertension if clinically felt by day team  Order TSH   Order Renin/aldostrone   Order Urine metanephrines  Order Renal doppler US .  Compare BP of right and left arm      Hypokalemia  Assessment & Plan  3.1. Replenished.    Plan  Continue to monitor  K >4.0 Mg >2.0 replete prn    Leukocytosis  Assessment & Plan  Least likely due to infection, CXR is not showing infilitrates. Likely stress induced    Plan  Continue to monitor for O2 saturation and fever.     Polycythemia  Assessment & Plan  Hct 57.8; likely due to morbid obesity leading to possible KATHY. Maybe Jak2 but less likely in this pt since the probablity it is attributed to KATHY is high.    Plan  Sleep study outpt  May consider CPAP  Pulse Ox monitoring    Elevated troponin I level  Assessment & Plan  Troponin trended down from 29 to 28. Less likely due to MI, but will wait for echocardiogram to check for wall motion abnormalities which can be reversed. ECG not showing ST-T and T wave changes, no LBBB.  Troponin likely due to exacerbated CHF/volume overload--demand ischemia.     Plan  Ordered echocardiogram  Will stop  trending troponin.         Morbid obesity (HCC)  Assessment & Plan   likely Pickwickian syndrome related, since pt has a STOP BANG at least 6, with CHF on EKG showing right ventricular hypertrophy with right axis deviation.     Plan   about weight loss, may consider bariatric surgery if cause of heart failure is due to extreme weight.   Monitor O2 sat via pulse ox raisa when sleeping.   Follow up with PCP for sleep study referral and possible surgery.     Metabolic X syndrome  Assessment & Plan  High BMI, with waist >35 inches. Hyperglycemia + dyslipidemia.     Plan  Ordered HgbA1c   Start statin  Diabetes education           Anticipated Hospital stay:  >2 midnights        Quality Measures  Quality-Core Measures   Reviewed items::  EKG reviewed, Medications reviewed, Labs reviewed and Radiology images reviewed  Hall catheter::  No Hall  DVT prophylaxis pharmacological::  Enoxaparin (Lovenox)    PCP: Pcp Pt States None

## 2019-08-20 NOTE — ASSESSMENT & PLAN NOTE
Least likely due to infection, CXR is not showing infilitrates. Likely stress induced    Plan  Continue to monitor for O2 saturation and fever.

## 2019-08-21 ENCOUNTER — APPOINTMENT (OUTPATIENT)
Dept: CARDIOLOGY | Facility: MEDICAL CENTER | Age: 39
DRG: 291 | End: 2019-08-21
Attending: STUDENT IN AN ORGANIZED HEALTH CARE EDUCATION/TRAINING PROGRAM
Payer: COMMERCIAL

## 2019-08-21 ENCOUNTER — TELEPHONE (OUTPATIENT)
Dept: CARDIOLOGY | Facility: MEDICAL CENTER | Age: 39
End: 2019-08-21

## 2019-08-21 VITALS
BODY MASS INDEX: 52.12 KG/M2 | DIASTOLIC BLOOD PRESSURE: 98 MMHG | WEIGHT: 312.83 LBS | OXYGEN SATURATION: 93 % | HEIGHT: 65 IN | HEART RATE: 98 BPM | SYSTOLIC BLOOD PRESSURE: 151 MMHG | RESPIRATION RATE: 18 BRPM | TEMPERATURE: 97.4 F

## 2019-08-21 LAB
ALBUMIN SERPL BCP-MCNC: 3.7 G/DL (ref 3.2–4.9)
ALBUMIN/GLOB SERPL: 1.2 G/DL
ALDOST SERPL-MCNC: 11 NG/DL
ALP SERPL-CCNC: 61 U/L (ref 30–99)
ALT SERPL-CCNC: 23 U/L (ref 2–50)
ANION GAP SERPL CALC-SCNC: 6 MMOL/L (ref 0–11.9)
AST SERPL-CCNC: 23 U/L (ref 12–45)
BILIRUB SERPL-MCNC: 1.2 MG/DL (ref 0.1–1.5)
BUN SERPL-MCNC: 18 MG/DL (ref 8–22)
CALCIUM SERPL-MCNC: 9.1 MG/DL (ref 8.5–10.5)
CHLORIDE SERPL-SCNC: 101 MMOL/L (ref 96–112)
CO2 SERPL-SCNC: 31 MMOL/L (ref 20–33)
COHGB MFR BLD: 1.9 % (ref 0–4.9)
CREAT SERPL-MCNC: 1.07 MG/DL (ref 0.5–1.4)
EPO SERPL-ACNC: 2 MU/ML (ref 4–27)
FERRITIN SERPL-MCNC: 184.4 NG/ML (ref 22–322)
GLOBULIN SER CALC-MCNC: 3 G/DL (ref 1.9–3.5)
GLUCOSE BLD-MCNC: 115 MG/DL (ref 65–99)
GLUCOSE BLD-MCNC: 161 MG/DL (ref 65–99)
GLUCOSE BLD-MCNC: 182 MG/DL (ref 65–99)
GLUCOSE SERPL-MCNC: 151 MG/DL (ref 65–99)
LV EJECT FRACT  99904: 40
LV EJECT FRACT MOD 4C 99902: 68.16
POTASSIUM SERPL-SCNC: 4.2 MMOL/L (ref 3.6–5.5)
PROT SERPL-MCNC: 6.7 G/DL (ref 6–8.2)
SODIUM SERPL-SCNC: 138 MMOL/L (ref 135–145)
TROPONIN T SERPL-MCNC: 32 NG/L (ref 6–19)
VIT B12 SERPL-MCNC: 499 PG/ML (ref 211–911)

## 2019-08-21 PROCEDURE — 93306 TTE W/DOPPLER COMPLETE: CPT | Mod: 26 | Performed by: INTERNAL MEDICINE

## 2019-08-21 PROCEDURE — 82962 GLUCOSE BLOOD TEST: CPT

## 2019-08-21 PROCEDURE — 700102 HCHG RX REV CODE 250 W/ 637 OVERRIDE(OP): Performed by: INTERNAL MEDICINE

## 2019-08-21 PROCEDURE — 36415 COLL VENOUS BLD VENIPUNCTURE: CPT

## 2019-08-21 PROCEDURE — 700102 HCHG RX REV CODE 250 W/ 637 OVERRIDE(OP): Performed by: STUDENT IN AN ORGANIZED HEALTH CARE EDUCATION/TRAINING PROGRAM

## 2019-08-21 PROCEDURE — 99239 HOSP IP/OBS DSCHRG MGMT >30: CPT | Mod: GC | Performed by: INTERNAL MEDICINE

## 2019-08-21 PROCEDURE — A9270 NON-COVERED ITEM OR SERVICE: HCPCS | Performed by: STUDENT IN AN ORGANIZED HEALTH CARE EDUCATION/TRAINING PROGRAM

## 2019-08-21 PROCEDURE — 80053 COMPREHEN METABOLIC PANEL: CPT

## 2019-08-21 PROCEDURE — 700111 HCHG RX REV CODE 636 W/ 250 OVERRIDE (IP): Performed by: STUDENT IN AN ORGANIZED HEALTH CARE EDUCATION/TRAINING PROGRAM

## 2019-08-21 PROCEDURE — 82668 ASSAY OF ERYTHROPOIETIN: CPT

## 2019-08-21 PROCEDURE — 93306 TTE W/DOPPLER COMPLETE: CPT

## 2019-08-21 PROCEDURE — 82728 ASSAY OF FERRITIN: CPT

## 2019-08-21 PROCEDURE — 700117 HCHG RX CONTRAST REV CODE 255: Performed by: STUDENT IN AN ORGANIZED HEALTH CARE EDUCATION/TRAINING PROGRAM

## 2019-08-21 PROCEDURE — 84484 ASSAY OF TROPONIN QUANT: CPT

## 2019-08-21 PROCEDURE — 82607 VITAMIN B-12: CPT

## 2019-08-21 PROCEDURE — 82375 ASSAY CARBOXYHB QUANT: CPT

## 2019-08-21 PROCEDURE — A9270 NON-COVERED ITEM OR SERVICE: HCPCS | Performed by: INTERNAL MEDICINE

## 2019-08-21 RX ORDER — CARVEDILOL 12.5 MG/1
12.5 TABLET ORAL 2 TIMES DAILY
Qty: 60 TAB | Refills: 1 | Status: SHIPPED | OUTPATIENT
Start: 2019-08-21 | End: 2019-09-04

## 2019-08-21 RX ORDER — SPIRONOLACTONE 25 MG/1
25 TABLET ORAL DAILY
Qty: 30 TAB | Refills: 0 | Status: SHIPPED | OUTPATIENT
Start: 2019-08-22 | End: 2019-09-19 | Stop reason: SDUPTHER

## 2019-08-21 RX ORDER — CHLORTHALIDONE 25 MG/1
25 TABLET ORAL DAILY
Qty: 30 TAB | Refills: 0 | Status: SHIPPED | OUTPATIENT
Start: 2019-08-21 | End: 2019-09-19

## 2019-08-21 RX ORDER — FUROSEMIDE 10 MG/ML
40 INJECTION INTRAMUSCULAR; INTRAVENOUS 2 TIMES DAILY PRN
Status: DISCONTINUED | OUTPATIENT
Start: 2019-08-21 | End: 2019-08-21 | Stop reason: HOSPADM

## 2019-08-21 RX ORDER — LISINOPRIL 20 MG/1
20 TABLET ORAL DAILY
Qty: 30 TAB | Refills: 1 | Status: SHIPPED | OUTPATIENT
Start: 2019-08-22 | End: 2019-09-21

## 2019-08-21 RX ORDER — FUROSEMIDE 10 MG/ML
40 INJECTION INTRAMUSCULAR; INTRAVENOUS PRN
Status: DISCONTINUED | OUTPATIENT
Start: 2019-08-21 | End: 2019-08-21

## 2019-08-21 RX ORDER — ATORVASTATIN CALCIUM 20 MG/1
20 TABLET, FILM COATED ORAL EVERY EVENING
Qty: 30 TAB | Refills: 1 | Status: SHIPPED | OUTPATIENT
Start: 2019-08-21 | End: 2019-09-21

## 2019-08-21 RX ORDER — FUROSEMIDE 40 MG/1
40 TABLET ORAL DAILY
Qty: 30 TAB | Refills: 0 | Status: SHIPPED | OUTPATIENT
Start: 2019-08-21 | End: 2019-09-19 | Stop reason: SDUPTHER

## 2019-08-21 RX ADMIN — FUROSEMIDE 40 MG: 10 INJECTION, SOLUTION INTRAMUSCULAR; INTRAVENOUS at 05:36

## 2019-08-21 RX ADMIN — LISINOPRIL 20 MG: 20 TABLET ORAL at 05:39

## 2019-08-21 RX ADMIN — SPIRONOLACTONE 25 MG: 25 TABLET ORAL at 05:39

## 2019-08-21 RX ADMIN — METFORMIN HYDROCHLORIDE 500 MG: 500 TABLET, FILM COATED ORAL at 17:08

## 2019-08-21 RX ADMIN — CHLORTHALIDONE 25 MG: 25 TABLET ORAL at 17:11

## 2019-08-21 RX ADMIN — INSULIN LISPRO 1 UNITS: 100 INJECTION, SOLUTION INTRAVENOUS; SUBCUTANEOUS at 14:08

## 2019-08-21 RX ADMIN — ENOXAPARIN SODIUM 40 MG: 100 INJECTION SUBCUTANEOUS at 05:37

## 2019-08-21 RX ADMIN — HUMAN ALBUMIN MICROSPHERES AND PERFLUTREN 3 ML: 10; .22 INJECTION, SOLUTION INTRAVENOUS at 16:30

## 2019-08-21 RX ADMIN — Medication 400 MG: at 17:11

## 2019-08-21 RX ADMIN — METFORMIN HYDROCHLORIDE 500 MG: 500 TABLET, FILM COATED ORAL at 08:18

## 2019-08-21 RX ADMIN — METOPROLOL TARTRATE 50 MG: 50 TABLET ORAL at 05:38

## 2019-08-21 RX ADMIN — POTASSIUM CHLORIDE 20 MEQ: 20 TABLET, EXTENDED RELEASE ORAL at 05:39

## 2019-08-21 RX ADMIN — INSULIN LISPRO 1 UNITS: 100 INJECTION, SOLUTION INTRAVENOUS; SUBCUTANEOUS at 08:18

## 2019-08-21 RX ADMIN — SENNOSIDES, DOCUSATE SODIUM 2 TABLET: 50; 8.6 TABLET, FILM COATED ORAL at 05:38

## 2019-08-21 RX ADMIN — Medication 400 MG: at 05:39

## 2019-08-21 RX ADMIN — METOPROLOL TARTRATE 50 MG: 50 TABLET ORAL at 17:08

## 2019-08-21 RX ADMIN — ATORVASTATIN CALCIUM 20 MG: 20 TABLET, FILM COATED ORAL at 17:09

## 2019-08-21 NOTE — PROGRESS NOTES
Internal Medicine Interval Note  Note Author: Jessica Anderson M.D.     Name John Wills     1980   Age/Sex 39 y.o. male   MRN [id number]   Code Status Full code     After 5PM or if no immediate response to page, please call for cross-coverage  Attending/Team: / Delio See Patient List for primary contact information  Call [phone] to page    1st Call - Day Intern (R1):    2nd Call - Day Sr. Resident (R2/R3):   Dr. Garza         Reason for interval visit  (Principal Problem)   Hypertensive emergency  Congestive heart failure        Interval Problem Daily Status Update  (24 hours, problem oriented, brief subjective history, new lab/imaging data pertinent to that problem)   Overnight patient stable. States that orthopnea and shortness of breath improved. Denies any chest pain, headaches, dizziness. States that leg swelling improved since admission. Tolerating oral feeds. Underwent 500 cc phlebotomy today.  Echo still pending. Urine drug screen negative.  Patient came in with hypertensive emergency yesterday with /176, gradually lowered to 140/103 now presently. No other significant complaints.  Labs this morning: TSH 2.6, HbA1c 9.3, UA: no proteinuria or glycosuria, or ketones. Hb 18.7, hct 58.7, K 3.7,P 4.1, Mg 1.6, doppler ultrasound lower extremity negative for DVT. ABG: Ph 7.46,Pc02 44.8,HCO3 31.            Review of Systems   Constitutional: Negative for chills and fever.   HENT: Negative for ear pain and hearing loss.    Eyes: Negative for blurred vision and photophobia.   Respiratory: Negative for cough and hemoptysis.    Cardiovascular: Positive for leg swelling. Negative for chest pain and palpitations.   Gastrointestinal: Negative for heartburn, nausea and vomiting.   Genitourinary: Negative for dysuria and urgency.   Musculoskeletal: Negative for back pain and falls.   Skin: Negative for itching and rash.   Neurological: Negative for dizziness, loss of  consciousness and headaches.   Psychiatric/Behavioral: Negative for depression and memory loss. The patient does not have insomnia.        Disposition/Barriers to discharge:   Patient remains inpatient for further evaluation and management.    Consultants/Specialty  None  PCP: Pcp Pt States None      Quality Measures  Quality-Core Measures   Reviewed items::  Labs reviewed, EKG reviewed and Medications reviewed  Hall catheter::  No Hall  DVT prophylaxis pharmacological::  Enoxaparin (Lovenox)          Physical Exam       Vitals:    08/20/19 1102 08/20/19 1354 08/20/19 1420 08/20/19 1500   BP: 156/103 (!) 165/109 144/97 140/103   Pulse: (!) 107   92   Resp: (!) 26   (!) 26   Temp: 37.1 °C (98.7 °F)   36.3 °C (97.4 °F)   TempSrc: Temporal   Temporal   SpO2: 94%   93%   Weight:       Height:         Body mass index is 52.09 kg/m². Weight: (!) 142 kg (313 lb 0.9 oz)  Oxygen Therapy:  Pulse Oximetry: 93 %, O2 (LPM): 0, O2 Delivery: Silicone Nasal Cannula    Physical Exam   Constitutional: He is oriented to person, place, and time and well-developed, well-nourished, and in no distress. No distress.   HENT:   Head: Normocephalic and atraumatic.   Acanthosis nigricans around neck   Eyes: Pupils are equal, round, and reactive to light. Conjunctivae and EOM are normal.   Neck: Normal range of motion. Neck supple.   Cardiovascular: Regular rhythm and normal heart sounds.   No murmur heard.  Tachycardic   Pulmonary/Chest: Effort normal and breath sounds normal.   Abdominal: Soft. Bowel sounds are normal. He exhibits no distension.   Musculoskeletal: Normal range of motion. He exhibits edema.   Bilateral lower extremity edema +2   Neurological: He is alert and oriented to person, place, and time. No cranial nerve deficit. He exhibits normal muscle tone.   Skin: No rash noted. No pallor.   Psychiatric: Mood, memory, affect and judgment normal.             Assessment/Plan     Exacerbation of Congestive heart failure (CHF)  ([facility])  Assessment & Plan   Likely this is hypertensive cardiomyopathy as pt has uncontrollable HTN combined with morbid obesity.   Urine drug screen negative  Orthopnea improved.    Plan   - ECHO pending   - lopressor 50 mg bid  - lasix bid  - spironolactone 25 mg Q day  - chlorthalidone 25 mg Q day  - Replace electrolytes adequately.  -  statin .   - cardiac diet, restrict water intake to 1.5 L, no added salt to diet            Hypertension  Assessment & Plan  Current blood pressure 140/103.  Pt not on any home anti-hypertensive medication.   TSH 2.63    Plan  - continue lisinopril, chlorthalidone  - lasix mostly for diuresis  -if not responding will evaluate for secondary causes of hypertension.    Hypokalemia  Assessment & Plan  Potassium 3.7 now.  Hypokalemia probably due to diuresis.    Plan  - Kdur bid, in view of on going lasix  -will add potassium sparing diuretic- spironolactone  -Continue to monitor      Polycythemia  Assessment & Plan  Hct 58.7; likely due to morbid obesity leading to possible KATHY. Will check EPO if low then test for JUANY 2 mutation  Plan  - EPO  -phlebotomy 500 cc goal is hCT<45  - F/U outpatient pcp for sleep apnea testing          Elevated troponin I level  Assessment & Plan  Troponin 28. Less likely due to MI,ECG not showing ST-T and T wave changes, no LBBB.  Troponin likely due to-demand ischemia.     Plan   echocardiogram pending           Morbid obesity (HCC)  Assessment & Plan   BMI>50, STOP BANG at least 6, with CHF on EKG showing right ventricular hypertrophy with right axis deviation.   Most likely pickwickian syndrome, KATHY    Plan  - about weight loss, may consider bariatric surgery if cause of heart failure is due to extreme weight.   -Monitor O2 sat via pulse ox raisa when sleeping.   -Follow up with PCP for sleep study referral and possible surgery.

## 2019-08-21 NOTE — PROGRESS NOTES
Pt had therapeutic phlebotomy yesterday for elevated H&H. No CBC reordered for this AM, addressed with team during rounds. Per resident, team will order repeat labs.

## 2019-08-21 NOTE — TELEPHONE ENCOUNTER
----- Message from Mecca Phoenix M.D. sent at 8/21/2019  4:40 PM PDT -----  Usama Tsang,     This patient needs consult appointment with next available MD.     Thank you.     [place]

## 2019-08-21 NOTE — DISCHARGE SUMMARY
Internal Medicine Discharge Summary  Note Author: Young Garza M.D.       Name John Wills     1980   Age/Sex 39 y.o. male   MRN [id number]         Admit Date:  2019       Discharge Date: 2019  Service:   [facility] Internal Medicine gray team  Attending Physician(s): Dr. Tl King      Senior Resident(s): Dr. Garza  Rc Resident(s): Dr. Anderson  PCP: Pcp Pt States None      Primary Diagnosis:   #Heart failure with preserved ejection fraction.  #Newly diagnosed diabetes    Secondary Diagnoses:                Active Problems:    Exacerbation of Congestive heart failure (CHF) (HCC) POA: Unknown    Hypertension POA: Unknown    Morbid obesity (HCC) POA: Unknown    Elevated troponin I level POA: Unknown    Polycythemia POA: Unknown    Hypokalemia POA: Unknown  Resolved Problems:    * No resolved hospital problems. *    Hospital Summary (Brief Narrative):       Mr. John Wills is a 39-year-old male who presented on 2019 to the emergency department with a history of worsening lower extremity edema and shortness of breath which he apparently noticed to be increasing since the last 2 weeks prior to presentation.  On presentation in the emergency department patient stated that he had shortness of breath with orthopnea and decreased ability to climb even 1 flight of stairs without shortness of breath.  Patient also stated that he was being evaluated for obstructive sleep apnea.  On evaluation on presentation patient had a troponin of 32, proBNP of 1366 with hypertension and no evidence of end organ damage and blood glucose in the 200s with polycythemia.  Patient was admitted to the medical floor where he was treated with, with Lasix and lisinopril, and was monitored on strict input output charting and daily weights for suspected heart failure with preserved ejection fraction.  And also had a therapeutic phlebotomy done on the june for suspected polycythemia  vera.  Patient was treated on, lisinopril 20 mg once daily, metformin 1000 mg twice daily and Lopressor 50 mg twice daily, spironolactone 25 mg once daily and Lasix 40 mg twice daily as needed, which resulted in resolution of shortness of breath and decrease in pedal edema, and ability to ambulate without discomfort.  Echocardiogram, done revealed an ejection fraction of 40%.  Patient was treated for heart failure with preserved ejection fraction and was advised follow-up care with cardiology, with primary care for follow-up on obstructive sleep apnea with sleep study and referral to heme oncology for polycythemia vera after testing for Oleg 2 mutation.  Patient was offered overnight observation to make sure appropriate follow-up was scheduled but refused to stay in the hospital, Patient is Cognitive, understands the concerns of the hospital staff and Doctor, and  patient was counseled regarding the above and was discharged in stable condition, with appointment details to be telephoned to the patient, tomorrow, once the clinic schedulers come to hospital,  with cardiology, and primary care practitioner.    Patient /Hospital Summary (Details -- Problem Oriented) :          Exacerbation of Congestive heart failure (CHF) (HCC)  Assessment & Plan   Likely this is hypertensive cardiomyopathy as pt has uncontrollable HTN combined with morbid obesity.   Urine drug screen negative  Orthopnea improved.  Patient stable no overnight complaints and would like to go home for outpatient evaluation and follow-up    Plan   - ECHO pending   - lopressor 50 mg bid  - lasix40 Qd  - spironolactone 25 mg Q day  - chlorthalidone 25 mg Q day  - Replace electrolytes adequately.  -  statin .   - cardiac diet, restrict water intake to 1.5 L, no added salt to diet            Hypertension  Assessment & Plan  Current blood pressure 140/103.  Pt not on any home anti-hypertensive medication.   TSH 2.63    Plan  - continue lisinopril,  chlorthalidone  - lasix mostly for diuresis  -if not responding will evaluate for secondary causes of hypertension.    Hypokalemia  Assessment & Plan  Potassium 4.2 now.  Hypokalemia probably due to diuresis.  Plan  - Kdur bid, in view of on going lasix  -On potassium sparing diuretic- spironolactone  -Continue to monitor      Polycythemia  Assessment & Plan  Hct 58.7; likely due to morbid obesity leading to possible KATHY. Will check EPO if low then test for JUANY 2 mutation  Plan  - EPO  -phlebotomy 500 cc goal is hCT<45  - F/U outpatient pcp for sleep apnea testing          Elevated troponin I level  Assessment & Plan  Troponin 28. Less likely due to MI,ECG not showing ST-T and T wave changes, no LBBB.  Troponin likely due to-demand ischemia.     Plan   echocardiogram pending           Morbid obesity (HCC)  Assessment & Plan   BMI>50, STOP BANG at least 6, with CHF on EKG showing right ventricular hypertrophy with right axis deviation.   Most likely pickwickian syndrome, KATHY    Plan  - about weight loss, may consider bariatric surgery if cause of heart failure is due to extreme weight.   -Monitor O2 sat via pulse ox raisa when sleeping.   -Follow up with PCP for sleep study referral and possible surgery.       Consultants:     Cardiology    Procedures:        None    Imaging/ Testing:    Chest x-ray on 8/19/2019 revealed hyperinflation with pulmonary vascular congestion and mild cardiomegaly with no pneumonia or overt pulmonary edema  Echocardiogram on 8/21/2019 reveals EF of 40%    Discharge Medications:         Medication Reconciliation: Completed       Medication List      START taking these medications      Instructions   atorvastatin 20 MG Tabs  Commonly known as:  LIPITOR   Take 1 Tab by mouth every evening for 31 days.  Dose:  20 mg     carvedilol 12.5 MG Tabs  Commonly known as:  COREG   Take 1 Tab by mouth 2 times a day for 31 days.  Dose:  12.5 mg     chlorthalidone 25 MG Tabs  Commonly known as:   HYGROTON   Take 1 Tab by mouth every day for 31 days.  Dose:  25 mg     furosemide 40 MG Tabs  Commonly known as:  LASIX   Take 1 Tab by mouth every day for 31 days.  Dose:  40 mg     lisinopril 20 MG Tabs  Start taking on:  8/22/2019  Commonly known as:  PRINIVIL   Take 1 Tab by mouth every day for 30 days.  Dose:  20 mg     metFORMIN 500 MG Tabs  Commonly known as:  GLUCOPHAGE   Take 2 Tabs by mouth 2 times a day for 31 days.  Dose:  1,000 mg     spironolactone 25 MG Tabs  Start taking on:  8/22/2019  Commonly known as:  ALDACTONE   Take 1 Tab by mouth every day for 30 days.  Dose:  25 mg            Can use .DISCHARGEMEDSLIST if going to another facility         Disposition: For home    Diet: Low-salt cardiac diet    Activity: As tolerated  Instructions:   Follow-up recommended for high hemoglobin with low erythropoietin with Oleg 2 on follow-up and heme oncology referral by PCP highly recommended.    -Patient to follow-up with cardiology as outpatient, for heart failure with preserved ejection fraction.  The patient was instructed to return to the ER in the event of worsening symptoms. I have counseled the patient on the importance of compliance and the patient has agreed to proceed with all medical recommendations and follow up plan indicated above.   The patient understands that all medications come with benefits and risks. Risks may include permanent injury or death and these risks can be minimized with close reassessment and monitoring.        Primary Care Provider:  Pcp Pt States None  Patient to be scheduled with primary care preferably with [facility].APPOINTMENT SCHEDULED AT 10 AM ON THE 23 OF AUGUST 2019  -Discussed with Dr.Anu Phoenix of cardiology, who will follow up in cardiology clinic.  -Patient to be seen by heme oncology to rule out polycythemia vera.  Discharge summary faxed to primary care provider:  no  Copy of discharge summary given to the patient: Completed      Follow up appointment details :    Follow-up with primary care practitioner, the patient counseled to be followed up for obstructive sleep apnea , with a sleep study.  Primary care practitioner also advised to refer patient to heme oncology for evaluation of polycythemia vera Oleg-2 mutation, as outpatient.  Patient will be followed up by cardiology in their clinic for heart failure with reduced ejection fraction.      Pending Studies:        None    Time spent on discharge day patient visit, preparing discharge paperwork and arranging for patient follow up.    Summary of follow up issues:   -Follow-up with PCP, for referral to heme oncology to rule out polycythemia vera.  -Follow-up with cardiology for heart failure with reduced ejection fraction.  -Follow-up with PCP with referral to pulmonology for possible obstructive sleep apnea for sleep study for    Discharge Time (Minutes) : 45 minutes  Hospital Course Type: Inpatient Stay < 2 midnights, patient recovered more rapidly than anticipated      Condition on Discharge : Stable  ______________________________________________________________________    Interval history/exam for day of discharge:    Patient denies any shortness of breath or chest discomfort with swelling of the legs decreased, and patient ambulating without dyspnea.  Denies any overnight events or day events.  No nausea, vomiting, dizziness or near syncopal attacks postadmission.    Review of Systems   Constitutional: Negative for chills and fever.   HENT: Negative for ear pain and hearing loss.    Eyes: Negative for blurred vision and photophobia.   Respiratory: Negative for cough and hemoptysis.    Cardiovascular: Positive for leg swelling. Negative for chest pain and palpitations.   Gastrointestinal: Negative for heartburn, nausea and vomiting.   Genitourinary: Negative for dysuria and urgency.   Musculoskeletal: Negative for back pain and falls.   Skin: Negative for itching and rash.   Neurological: Negative for dizziness, loss  of consciousness and headaches.   Psychiatric/Behavioral: Negative for depression and memory loss. The patient does not have insomnia.        Physical Exam   Constitutional: He is oriented to person, place, and time and well-developed, well-nourished, and in no distress. No distress.   HENT:   Head: Normocephalic and atraumatic.   Acanthosis nigricans around neck   Eyes: Pupils are equal, round, and reactive to light. Conjunctivae and EOM are normal.   Neck: Normal range of motion. Neck supple.   Cardiovascular: Regular rhythm and normal heart sounds.   No murmur heard.   Pulmonary/Chest: Effort normal and breath sounds normal.   Abdominal: Soft. Bowel sounds are normal. He exhibits no distension.   Musculoskeletal: Normal range of motion. He exhibits edema.   Bilateral lower extremity edema +1   Neurological: He is alert and oriented to person, place, and time. No cranial nerve deficit. He exhibits normal muscle tone.   Skin: No rash noted. No pallor.   Psychiatric: Mood, memory, affect and judgment normal.     Disposition/Barriers to discharge:   Most Recent Labs:    Lab Results   Component Value Date/Time    WBC 11.3 (H) 08/20/2019 02:06 AM    RBC 6.42 (H) 08/20/2019 02:06 AM    HEMOGLOBIN 18.7 (H) 08/20/2019 02:06 AM    HEMATOCRIT 58.7 (H) 08/20/2019 02:06 AM    MCV 91.4 08/20/2019 02:06 AM    MCH 29.1 08/20/2019 02:06 AM    MCHC 31.9 (L) 08/20/2019 02:06 AM    MPV 10.7 08/20/2019 02:06 AM    NEUTSPOLYS 76.10 (H) 08/20/2019 02:06 AM    LYMPHOCYTES 15.20 (L) 08/20/2019 02:06 AM    MONOCYTES 7.20 08/20/2019 02:06 AM    EOSINOPHILS 0.10 08/20/2019 02:06 AM    BASOPHILS 1.00 08/20/2019 02:06 AM      Lab Results   Component Value Date/Time    SODIUM 138 08/21/2019 03:33 AM    POTASSIUM 4.2 08/21/2019 03:33 AM    CHLORIDE 101 08/21/2019 03:33 AM    CO2 31 08/21/2019 03:33 AM    GLUCOSE 151 (H) 08/21/2019 03:33 AM    BUN 18 08/21/2019 03:33 AM    CREATININE 1.07 08/21/2019 03:33 AM      Lab Results   Component Value  Date/Time    ALTSGPT 23 08/21/2019 03:33 AM    ASTSGOT 23 08/21/2019 03:33 AM    ALKPHOSPHAT 61 08/21/2019 03:33 AM    TBILIRUBIN 1.2 08/21/2019 03:33 AM    ALBUMIN 3.7 08/21/2019 03:33 AM    GLOBULIN 3.0 08/21/2019 03:33 AM     No results found for: PROTHROMBTM, INR

## 2019-08-21 NOTE — PROGRESS NOTES
Pt ambulated hallways with this RN. No c/o SOB, pt did very well. RA sat during ambulation was %.

## 2019-08-21 NOTE — PROGRESS NOTES
Cardiovascular Nurse Navigator () Advanced Heart Failure Program Inpatient Progress Note:     Chief Complaint: patient presented to ER on 8/19 with complaints of SOB and LE edema. H&P notes that patient can't walk a flight of stairs without being SOB.    No echocardiogram or prior encounters on file, echo ordered pending completion.    Please note that patient was diagnosed with HF by IM.    If any education is provided to patient and family, please specify that heart failure is a working diagnosis until MD has confirmed diagnosis and has discussed it with the patient.     If after echocardiogram results are available, heart failure is ruled out, respectfully request that:    1. Attending provider clearly state in note that HF is ruled out. If this is not done, patient will still code for heart failure.    2. Remove HF from the problems list so that staff are not confused about necessary interventions and patient does not receive education on a diagnosis he does not have.    If echocardiogram results do not eliminate HF as a diagnosis, please consult cardiology as is expected for all new HF diagnoses.     Thank you and please call with questions, Cecilia

## 2019-08-22 ENCOUNTER — PATIENT OUTREACH (OUTPATIENT)
Dept: HEALTH INFORMATION MANAGEMENT | Facility: OTHER | Age: 39
End: 2019-08-22

## 2019-08-22 LAB — EPO SERPL-ACNC: 2 MU/ML (ref 4–27)

## 2019-08-22 NOTE — PROGRESS NOTES
Discharge planning, medications and follow up appointments reviewed with pt and spouse. Heart failure booklet provided and reviewed. Pt instructed to limit fluids to 1500ml/day and to also obtain a BP monitoring cuff for use at home. Pt will need to follow up with PCP this week and should expect a call regarding HF f/u appt within the next couple of days. All questions answered.

## 2019-08-22 NOTE — PROGRESS NOTES
After speaking with charge nurse and Dr. Camacho at length, pt should not be discharged without a HF follow up with his new diagnosis. This has been explained to patient and wife by RN, Charge RN and MD. The patient states he understands but does not want to stay another night and is going to go home tonight. Dr. Camacho informed patient that he would be leaving AMA if he decides to leave tonight, pt understands and is still wanting to leave.

## 2019-08-22 NOTE — PROGRESS NOTES
Attempted to reach hospital schedulers multiple times, as has Dr. Anderson, for outpatient follow up appoints. Pt has new diagnosis of heart failure and needs to follow up with HF clinic, along with a new diagnosis of diabetes and sleep apnea for which he needs to see his PCP.

## 2019-08-22 NOTE — PROGRESS NOTES
Per Dr. Clement on [facility] team, it is ok to discharge the patient without any scheduled follow up appointments. Team with try to reach hospital schedulers again in the morning and call patient at home to notify him of appointments.

## 2019-08-22 NOTE — PROGRESS NOTES
Dr. King disagrees with AMA, pt may leave as planned discharge without having a heart failure follow-up. PCP information printed on discharge instruction, pt is to call and schedule f/u appt for this week, number provided. [facility] states they will reach hospital schedulers in the morning to schedule a HF clinic appt. Per NAM, scheduled PCP f/u appt is sufficient until HF appt can be determined. Pt and spouse aware and are in understanding. Charge RN updated with discharge plan.

## 2019-08-22 NOTE — PROGRESS NOTES
39(   )  INTERVAL:  Chart reviewed/summarized,     152: HR 98, , 93% RA     cardiology: suggest outpatient f/u   because of the hour we were unable to get definitive outpatient f/u , offered patient overnight observation to make sure appropriate appt were made,  the patient is unwilling to stay however, he has capacity, is not being irrational as is medically stable,  he understands that he has several potentially danger dx (HFrEF, DM, prob KATHY, polycythemai /no clearly secondary )  and complicated followup issues,  understands our concerns about suboptimal discharge planning, he is available by phone in AM, understands the critical need for close f/u with new HF, we wll call in AM and make sure appropriate f/u is arranged, we have talked with cardiology and they agreed that the patient could be followed up as an outpateint      -140s,  ECHO:   EF 40s, mild LVH, RV not seen, RA wnl, normal IVC, inspiratory collapse, normal LA size, MV not seen, RVSP not given, no effusion, normal root      Aug 21AM: AF< H R94, /88 , HR94 , 93% RA, MC 24   B12 499, Ferritn 184, K 4.2, CO2 31, , CR 1.07, trop T 32, HBO 1.90, phlebotomy     PM: BP  130-140s, asymptomatic , off O2, QTc 499,  , off O2 , H R96  Plan: Lopressor BID (hyperadrenergic),  ACE 20mg, chlorthalidone 25mg, spironolactone 25mg,  Lasix (avoiding CCB because of edemea)   EKG: stable YECENIA anterior leads, benign ST segement, trop 30, no angina, no change on serial studies   Plan: on tme meds, KCL, MG BID      PM: /110, , 94% 2L NC, no encephalopathy, no angina, HF s/s improved, edema better ,k MG 1.6, PHOS 4.1  AB/46--- (prob chronic resp acidosis with compensation, now with some element of LASIX induced metabolic alkalosis) , echo pending  US: no DVT  Impresssion: HTN emergency, HFpEF, demand ischemia, 2ndary HTN?   Plan: Lopressor (rate , hyperadrenergic), ACE, chlorthalidone, LASIX, prob add spironolactone , K, MG  replacement, Phlebotomy, IS, ambulation, no added NA diet , EPO level (not that hypoxemic, ABG somewhat against severe OHS)      Aug 20AM: AF, H R122 --> 90, /105, 94% 2L NC  K 3.1, CO2 31, CR 0.93, Na 139, trrop 28, wBC 11, LDL 99, HDL 32L,      Aug 19PM: AF, , /150, 96$ 1L, BMI> 40      Impression:  * HTN emergency, ess HTN with noncompliance ? r/o KATHY/ETOH/meth r/o 2ndary HTN -- on time 3 meds ACE/Arb, CCB, chlorthalidone, back off if becomes labile (usually means low effective circulating volume/ECV give fluid bolus ) , Lasix trial  if pulm congestion and BP remains high  IV labetalol if >180/110 AND symptoms   * acute hypoxemic resp failure, pulm edema, atelectasis, r/o PULM HTN  -- ECHO (EF 40s, nondx for pulm HTN) , HFrEF , BP management   * demand ischemia -- trend, reassess after BP , volume control   *DM2  (HBA1C 9) --  HBa1C , UA PROT/CR if applicable, ACE/ARB, metformin trial , LANTUS if >> 200s   * hyperadrenergic, UTOX, FT4, KATHY/ETOH DOC /PHEO screening    * polycythemia, WBC < 12, PLT < 600, likely 2ndary -- phlebotomy, Ferritin, B12 normal, low EPO --> atypical PCV?      MEDS: coreg, ACE, chlorthalidone spironolactone , lasix,  metformin , KCL      CORE:  Code Status (  FULL  --------------------------------------------------------------------------------------------------  Hospital Summary/ Patient System Review      NP:   *admit(  AO4, no delirium, UTOX neg  Plan: UTOX      EENT:   *admit(  MP score, KATHY /OHS screening , eye exam attempt for hemorrhage/HTN changes      MSK/PAIN:   *admit(       CVS:   *admit(  HTN urgency, Trop T 32, stable on repeat, BNP 1366, ddimer 630 , leg swelling, wht gain,  SOB/orthopnea, +FHx , JVD, distant Heart sonds  EKG: RAD, sinus tachy, no ACS apparent      ECHO:   EF 40s, mild LVH, RV not seen, RA wnl, normal IVC, inspiratory collapse, normal LA size, MV not seen, RVSP not given, no effusion, normal root   cardiology : outpatietn f/u    Impression: HTN emergency resolved , acute HFrEF (EF 40) , cor pulm/ PH likely Type 2 or 3,  demand ischemia,  high afterload, hyperadrenergic resolved   Plan: BBL, ACE, chorthalidone, spironolactone, lasix QD/prn, K, MG, lasix/diamox, , Na restriction     PULM:   *admit( smoker, ACUTE:  pCXR: hypo inflation, mild CM, no infiltrates , HCO3 29 DDIMER 630 , ABG done after lasix, BP meds -- 7.46-45-x-31  Impression: acute/chronic hypoxemic, hypercapnic resp failure , Acute HFrEF,  likely KATHY/OHS component  compensation for resp acidosis with lasix metabolic alkalosis now   , COPD?   Plan:  HF management, KATHY referral      GI:   *admit(  LT wnl , ALB 3.6      :   *admit(       RENAL:   *admit(  Na 138, K 3.7, CO2 29, Bs 239, BUN 17, c R0.99, CA 9.4/3.6   HypoK ,  metabolic alkalosis  ?>    (2ndary HTN?  Cushing’s, JOSH, Conns, Pheo not excluded ) vs compensation for resp acidosis  Plan: Urine K , UA for nephrosclerosis/protein      HEME:    *admit(  WBC 11, HB 19,  , B12 499, Ferritn 184, HBCO 1.9% , low EPO 2 , B12 499  Impressoin:  polycythemia,  low EPO, no other findings for PCV (WBC < 12, PLT < 600 so PCV unlikely)   Plan: HBCO (smoker < 2 ),  needs  heme f/u  (consider bone marrow for PCV, JAK2         ENDO:   *admit(  +  Impression: DM2, metabolic syndrome, hypoK met alkalosis , HTN  Plan: HBA1C, metformin, add Lantus 0.2U/KG If persists >> 200s , FT4 , lipids , consider endocrine eval /2ndary HTN (PHEO , urine metanephrine, Cushing’s , 1mg, AM cortisol) ,      DERM/BREAST:   *admit(       ID:   *admit(   SULFA allergy (remote, no SJS) , no SIRS   several BP meds have 2ndary sulfa moieties , these can be safely given raisa in abscene of SJS, d/w patient

## 2019-08-22 NOTE — DISCHARGE INSTRUCTIONS
Follow-up recommended for high hemoglobin with low erythropoietin with Oleg 2 on follow-up and heme oncology referral by PCP highly recommended.    -Follow-up with PCP, for referral to heme oncology to rule out polycythemia vera.  -Follow-up with cardiology for heart failure with reduced ejection fraction.  -Follow-up with PCP with referral to pulmonology for possible obstructive sleep apnea for sleep study for      Spironolactone tablets  What is this medicine?  SPIRONOLACTONE (alyce on oh LAK tone) is a diuretic. It helps you make more urine and to lose excess water from your body. This medicine is used to treat high blood pressure, and edema or swelling from heart, kidney, or liver disease. It is also used to treat patients who make too much aldosterone or have low potassium.  This medicine may be used for other purposes; ask your health care provider or pharmacist if you have questions.  COMMON BRAND NAME(S): Aldactone  What should I tell my health care provider before I take this medicine?  They need to know if you have any of these conditions:  -high blood level of potassium  -kidney disease or trouble making urine  -liver disease  -an unusual or allergic reaction to spironolactone, other medicines, foods, dyes, or preservatives  -pregnant or trying to get pregnant  -breast-feeding  How should I use this medicine?  Take this medicine by mouth with a drink of water. Follow the directions on your prescription label. You can take it with or without food. If it upsets your stomach, take it with food. Do not take your medicine more often than directed. Remember that you will need to pass more urine after taking this medicine. Do not take your doses at a time of day that will cause you problems. Do not take at bedtime.  Talk to your pediatrician regarding the use of this medicine in children. While this drug may be prescribed for selected conditions, precautions do apply.  Overdosage: If you think you have taken too  much of this medicine contact a poison control center or emergency room at once.  NOTE: This medicine is only for you. Do not share this medicine with others.  What if I miss a dose?  If you miss a dose, take it as soon as you can. If it is almost time for your next dose, take only that dose. Do not take double or extra doses.  What may interact with this medicine?  Do not take this medicine with any of the following medications:  -eplerenone  This medicine may also interact with the following medications:  -corticosteroids  -digoxin  -lithium  -medicines for high blood pressure like ACE inhibitors  -skeletal muscle relaxants like tubocurarine  -NSAIDs, medicines for pain and inflammation, like ibuprofen or naproxen  -potassium products like salt substitute or supplements  -pressor amines like norepinephrine  -some diuretics  This list may not describe all possible interactions. Give your health care provider a list of all the medicines, herbs, non-prescription drugs, or dietary supplements you use. Also tell them if you smoke, drink alcohol, or use illegal drugs. Some items may interact with your medicine.  What should I watch for while using this medicine?  Visit your doctor or health care professional for regular checks on your progress. Check your blood pressure as directed. Ask your doctor what your blood pressure should be, and when you should contact them.  You may need to be on a special diet while taking this medicine. Ask your doctor. Also, ask how many glasses of fluid you need to drink a day. You must not get dehydrated.  This medicine may make you feel confused, dizzy or lightheaded. Drinking alcohol and taking some medicines can make this worse. Do not drive, use machinery, or do anything that needs mental alertness until you know how this medicine affects you. Do not sit or stand up quickly.  What side effects may I notice from receiving this medicine?  Side effects that you should report to your  doctor or health care professional as soon as possible:  -allergic reactions such as skin rash or itching, hives, swelling of the lips, mouth, tongue, or throat  -black or tarry stools  -fast, irregular heartbeat  -fever  -muscle pain, cramps  -numbness, tingling in hands or feet  -trouble breathing  -trouble passing urine  -unusual bleeding  -unusually weak or tired  Side effects that usually do not require medical attention (report to your doctor or health care professional if they continue or are bothersome):  -change in voice or hair growth  -confusion  -dizzy, drowsy  -dry mouth, increased thirst  -enlarged or tender breasts  -headache  -irregular menstrual periods  -sexual difficulty, unable to have an erection  -stomach upset  This list may not describe all possible side effects. Call your doctor for medical advice about side effects. You may report side effects to FDA at 2-307-FDA-8784.  Where should I keep my medicine?  Keep out of the reach of children.  Store below 25 degrees C (77 degrees F). Throw away any unused medicine after the expiration date.  NOTE: This sheet is a summary. It may not cover all possible information. If you have questions about this medicine, talk to your doctor, pharmacist, or health care provider.  © 2018 [employer] (2011-08-30 12:51:30)  Metformin tablets  What is this medicine?  METFORMIN (met FOR min) is used to treat type 2 diabetes. It helps to control blood sugar. Treatment is combined with diet and exercise. This medicine can be used alone or with other medicines for diabetes.  This medicine may be used for other purposes; ask your health care provider or pharmacist if you have questions.  COMMON BRAND NAME(S): Glucophage  What should I tell my health care provider before I take this medicine?  They need to know if you have any of these conditions:  -anemia  -frequently drink alcohol-containing beverages  -become easily dehydrated  -heart attack  -heart failure  that is treated with medications  -kidney disease  -liver disease  -polycystic ovary syndrome  -serious infection or injury  -vomiting  -an unusual or allergic reaction to metformin, other medicines, foods, dyes, or preservatives  -pregnant or trying to get pregnant  -breast-feeding  How should I use this medicine?  Take this medicine by mouth. Take it with meals. Swallow the tablets with a drink of water. Follow the directions on the prescription label. Take your medicine at regular intervals. Do not take your medicine more often than directed.  Talk to your pediatrician regarding the use of this medicine in children. While this drug may be prescribed for children as young as 10 years of age for selected conditions, precautions do apply.  Overdosage: If you think you have taken too much of this medicine contact a poison control center or emergency room at once.  NOTE: This medicine is only for you. Do not share this medicine with others.  What if I miss a dose?  If you miss a dose, take it as soon as you can. If it is almost time for your next dose, take only that dose. Do not take double or extra doses.  What may interact with this medicine?  Do not take this medicine with any of the following medications:  -dofetilide  -gatifloxacin  -certain contrast medicines given before X-rays, CT scans, MRI, or other procedures  This medicine may also interact with the following medications:  -acetazolamide  -certain medicines for HIV infection or hepatitis, like adefovir, emtricitabine, entecavir, lamivudine, or tenofovir  -cimetidine  -crizotinib  -digoxin  -diuretics  -female hormones, like estrogens or progestins and birth control pills  -glycopyrrolate  -isoniazid  -lamotrigine  -medicines for blood pressure, heart disease, irregular heart beat  -memantine  -midodrine  -methazolamide  -morphine  -nicotinic acid  -phenothiazines like chlorpromazine, mesoridazine, prochlorperazine,  thioridazine  -phenytoin  -procainamide  -propantheline  -quinidine  -quinine  -ranitidine  -ranolazine  -steroid medicines like prednisone or cortisone  -stimulant medicines for attention disorders, weight loss, or to stay awake  -thyroid medicines  -topiramate  -trimethoprim  -trospium  -vancomycin  -vandetanib  -zonisamide  This list may not describe all possible interactions. Give your health care provider a list of all the medicines, herbs, non-prescription drugs, or dietary supplements you use. Also tell them if you smoke, drink alcohol, or use illegal drugs. Some items may interact with your medicine.  What should I watch for while using this medicine?  Visit your doctor or health care professional for regular checks on your progress.  A test called the HbA1C (A1C) will be monitored. This is a simple blood test. It measures your blood sugar control over the last 2 to 3 months. You will receive this test every 3 to 6 months.  Learn how to check your blood sugar. Learn the symptoms of low and high blood sugar and how to manage them.  Always carry a quick-source of sugar with you in case you have symptoms of low blood sugar. Examples include hard sugar candy or glucose tablets. Make sure others know that you can choke if you eat or drink when you develop serious symptoms of low blood sugar, such as seizures or unconsciousness. They must get medical help at once.  Tell your doctor or health care professional if you have high blood sugar. You might need to change the dose of your medicine. If you are sick or exercising more than usual, you might need to change the dose of your medicine.  Do not skip meals. Ask your doctor or health care professional if you should avoid alcohol. Many nonprescription cough and cold products contain sugar or alcohol. These can affect blood sugar.  This medicine may cause ovulation in premenopausal women who do not have regular monthly periods. This may increase your chances of  becoming pregnant. You should not take this medicine if you become pregnant or think you may be pregnant. Talk with your doctor or health care professional about your birth control options while taking this medicine. Contact your doctor or health care professional right away if think you are pregnant.  If you are going to need surgery, a MRI, CT scan, or other procedure, tell your doctor that you are taking this medicine. You may need to stop taking this medicine before the procedure.  Wear a medical ID bracelet or chain, and carry a card that describes your disease and details of your medicine and dosage times.  What side effects may I notice from receiving this medicine?  Side effects that you should report to your doctor or health care professional as soon as possible:  -allergic reactions like skin rash, itching or hives, swelling of the face, lips, or tongue  -breathing problems  -feeling faint or lightheaded, falls  -muscle aches or pains  -signs and symptoms of low blood sugar such as feeling anxious, confusion, dizziness, increased hunger, unusually weak or tired, sweating, shakiness, cold, irritable, headache, blurred vision, fast heartbeat, loss of consciousness  -slow or irregular heartbeat  -unusual stomach pain or discomfort  -unusually tired or weak  Side effects that usually do not require medical attention (report to your doctor or health care professional if they continue or are bothersome):  -diarrhea  -headache  -heartburn  -metallic taste in mouth  -nausea  -stomach gas, upset  This list may not describe all possible side effects. Call your doctor for medical advice about side effects. You may report side effects to FDA at 6-982-FDA-4983.  Where should I keep my medicine?  Keep out of the reach of children.  Store at room temperature between 15 and 30 degrees C (59 and 86 degrees F). Protect from moisture and light. Throw away any unused medicine after the expiration date.  NOTE: This sheet is a  summary. It may not cover all possible information. If you have questions about this medicine, talk to your doctor, pharmacist, or health care provider.  © 2018 [employer] Standard (2015-06-02 22:14:40)  Lisinopril tablets  What is this medicine?  LISINOPRIL (lyse [place] oh pril) is an ACE inhibitor. This medicine is used to treat high blood pressure and heart failure. It is also used to protect the heart immediately after a heart attack.  This medicine may be used for other purposes; ask your health care provider or pharmacist if you have questions.  COMMON BRAND NAME(S): Prinivil, Zestril  What should I tell my health care provider before I take this medicine?  They need to know if you have any of these conditions:  -diabetes  -heart or blood vessel disease  -kidney disease  -low blood pressure  -previous swelling of the tongue, face, or lips with difficulty breathing, difficulty swallowing, hoarseness, or tightening of the throat  -an unusual or allergic reaction to lisinopril, other ACE inhibitors, insect venom, foods, dyes, or preservatives  -pregnant or trying to get pregnant  -breast-feeding  How should I use this medicine?  Take this medicine by mouth with a glass of water. Follow the directions on your prescription label. You may take this medicine with or without food. If it upsets your stomach, take it with food. Take your medicine at regular intervals. Do not take it more often than directed. Do not stop taking except on your doctor's advice.  Talk to your pediatrician regarding the use of this medicine in children. Special care may be needed. While this drug may be prescribed for children as young as 6 years of age for selected conditions, precautions do apply.  Overdosage: If you think you have taken too much of this medicine contact a poison control center or emergency room at once.  NOTE: This medicine is only for you. Do not share this medicine with others.  What if I miss a dose?  If you miss a  dose, take it as soon as you can. If it is almost time for your next dose, take only that dose. Do not take double or extra doses.  What may interact with this medicine?  Do not take this medicine with any of the following medications:  -hymenoptera venom  -sacubitril; valsartan  This medicines may also interact with the following medications:  -aliskiren  -angiotensin receptor blockers, like losartan or valsartan  -certain medicines for diabetes  -diuretics  -everolimus  -gold compounds  -lithium  -NSAIDs, medicines for pain and inflammation, like ibuprofen or naproxen  -potassium salts or supplements  -salt substitutes  -sirolimus  -temsirolimus  This list may not describe all possible interactions. Give your health care provider a list of all the medicines, herbs, non-prescription drugs, or dietary supplements you use. Also tell them if you smoke, drink alcohol, or use illegal drugs. Some items may interact with your medicine.  What should I watch for while using this medicine?  Visit your doctor or health care professional for regular check ups. Check your blood pressure as directed. Ask your doctor what your blood pressure should be, and when you should contact him or her.  Do not treat yourself for coughs, colds, or pain while you are using this medicine without asking your doctor or health care professional for advice. Some ingredients may increase your blood pressure.  Women should inform their doctor if they wish to become pregnant or think they might be pregnant. There is a potential for serious side effects to an unborn child. Talk to your health care professional or pharmacist for more information.  Check with your doctor or health care professional if you get an attack of severe diarrhea, nausea and vomiting, or if you sweat a lot. The loss of too much body fluid can make it dangerous for you to take this medicine.  You may get drowsy or dizzy. Do not drive, use machinery, or do anything that needs  mental alertness until you know how this drug affects you. Do not stand or sit up quickly, especially if you are an older patient. This reduces the risk of dizzy or fainting spells. Alcohol can make you more drowsy and dizzy. Avoid alcoholic drinks.  Avoid salt substitutes unless you are told otherwise by your doctor or health care professional.  What side effects may I notice from receiving this medicine?  Side effects that you should report to your doctor or health care professional as soon as possible:  -allergic reactions like skin rash, itching or hives, swelling of the hands, feet, face, lips, throat, or tongue  -breathing problems  -signs and symptoms of kidney injury like trouble passing urine or change in the amount of urine  -signs and symptoms of increased potassium like muscle weakness; chest pain; or fast, irregular heartbeat  -signs and symptoms of liver injury like dark yellow or brown urine; general ill feeling or flu-like symptoms; light-colored stools; loss of appetite; nausea; right upper belly pain; unusually weak or tired; yellowing of the eyes or skin  -signs and symptoms of low blood pressure like dizziness; feeling faint or lightheaded, falls; unusually weak or tired  -stomach pain with or without nausea and vomiting  Side effects that usually do not require medical attention (report to your doctor or health care professional if they continue or are bothersome):  -changes in taste  -cough  -dizziness  -fever  -headache  -sensitivity to light  This list may not describe all possible side effects. Call your doctor for medical advice about side effects. You may report side effects to FDA at 7-041-FDA-0014.  Where should I keep my medicine?  Keep out of the reach of children.  Store at room temperature between 15 and 30 degrees C (59 and 86 degrees F). Protect from moisture. Keep container tightly closed. Throw away any unused medicine after the expiration date.  NOTE: This sheet is a summary. It  may not cover all possible information. If you have questions about this medicine, talk to your doctor, pharmacist, or health care provider.  © 2018 [employer] Standard (2017-02-06 12:52:35)  Furosemide tablets  What is this medicine?  FUROSEMIDE (marshal [place] se mide) is a diuretic. It helps you make more urine and to lose salt and excess water from your body. This medicine is used to treat high blood pressure, and edema or swelling from heart, kidney, or liver disease.  This medicine may be used for other purposes; ask your health care provider or pharmacist if you have questions.  COMMON BRAND NAME(S): Active-Medicated Specimen Kit, Delone, Diuscreen, Lasix, RX Specimen Collection Kit, Specimen Collection Kit, URINX Medicated Specimen Collection  What should I tell my health care provider before I take this medicine?  They need to know if you have any of these conditions:  -abnormal blood electrolytes  -diarrhea or vomiting  -gout  -heart disease  -kidney disease, small amounts of urine, or difficulty passing urine  -liver disease  -thyroid disease  -an unusual or allergic reaction to furosemide, sulfa drugs, other medicines, foods, dyes, or preservatives  -pregnant or trying to get pregnant  -breast-feeding  How should I use this medicine?  Take this medicine by mouth with a glass of water. Follow the directions on the prescription label. You may take this medicine with or without food. If it upsets your stomach, take it with food or milk. Do not take your medicine more often than directed. Remember that you will need to pass more urine after taking this medicine. Do not take your medicine at a time of day that will cause you problems. Do not take at bedtime.  Talk to your pediatrician regarding the use of this medicine in children. While this drug may be prescribed for selected conditions, precautions do apply.  Overdosage: If you think you have taken too much of this medicine contact a poison control center or  emergency room at once.  NOTE: This medicine is only for you. Do not share this medicine with others.  What if I miss a dose?  If you miss a dose, take it as soon as you can. If it is almost time for your next dose, take only that dose. Do not take double or extra doses.  What may interact with this medicine?  -aspirin and aspirin-like medicines  -certain antibiotics  -chloral hydrate  -cisplatin  -cyclosporine  -digoxin  -diuretics  -laxatives  -lithium  -medicines for blood pressure  -medicines that relax muscles for surgery  -methotrexate  -NSAIDs, medicines for pain and inflammation like ibuprofen, naproxen, or indomethacin  -phenytoin  -steroid medicines like prednisone or cortisone  -sucralfate  -thyroid hormones  This list may not describe all possible interactions. Give your health care provider a list of all the medicines, herbs, non-prescription drugs, or dietary supplements you use. Also tell them if you smoke, drink alcohol, or use illegal drugs. Some items may interact with your medicine.  What should I watch for while using this medicine?  Visit your doctor or health care professional for regular checks on your progress. Check your blood pressure regularly. Ask your doctor or health care professional what your blood pressure should be, and when you should contact him or her. If you are a diabetic, check your blood sugar as directed.  You may need to be on a special diet while taking this medicine. Check with your doctor. Also, ask how many glasses of fluid you need to drink a day. You must not get dehydrated.  You may get drowsy or dizzy. Do not drive, use machinery, or do anything that needs mental alertness until you know how this drug affects you. Do not stand or sit up quickly, especially if you are an older patient. This reduces the risk of dizzy or fainting spells. Alcohol can make you more drowsy and dizzy. Avoid alcoholic drinks.  This medicine can make you more sensitive to the sun. Keep out  of the sun. If you cannot avoid being in the sun, wear protective clothing and use sunscreen. Do not use sun lamps or tanning beds/booths.  What side effects may I notice from receiving this medicine?  Side effects that you should report to your doctor or health care professional as soon as possible:  -blood in urine or stools  -dry mouth  -fever or chills  -hearing loss or ringing in the ears  -irregular heartbeat  -muscle pain or weakness, cramps  -skin rash  -stomach upset, pain, or nausea  -tingling or numbness in the hands or feet  -unusually weak or tired  -vomiting or diarrhea  -yellowing of the eyes or skin  Side effects that usually do not require medical attention (report to your doctor or health care professional if they continue or are bothersome):  -headache  -loss of appetite  -unusual bleeding or bruising  This list may not describe all possible side effects. Call your doctor for medical advice about side effects. You may report side effects to FDA at 7-199-FDA-3839.  Where should I keep my medicine?  Keep out of the reach of children.  Store at room temperature between 15 and 30 degrees C (59 and 86 degrees F). Protect from light. Throw away any unused medicine after the expiration date.  NOTE: This sheet is a summary. It may not cover all possible information. If you have questions about this medicine, talk to your doctor, pharmacist, or health care provider.  © 2018 [employer] Standard (2016-03-09 13:49:50)  Chlorthalidone tablets  What is this medicine?  CHLORTHALIDONE (klor THAL i done) is a diuretic. It increases the amount of urine passed, which causes the body to lose salt and water. This medicine is used to treat high blood pressure and edema or water retention.  This medicine may be used for other purposes; ask your health care provider or pharmacist if you have questions.  COMMON BRAND NAME(S): Thalitone  What should I tell my health care provider before I take this medicine?  They need  to know if you have any of these conditions:  -asthma  -diabetes  -gout  -kidney disease  -liver disease  -parathyroid disease  -systemic lupus erythematosus (SLE)  -taking cortisone, digoxin, lithium carbonate, or drugs for diabetes  -an unusual or allergic reaction to chlorthalidone, sulfa drugs, other medicines, foods, dyes, or preservatives  -pregnant or trying to get pregnant  -breast-feeding  How should I use this medicine?  Take this medicine by mouth with a glass of water. Follow the directions on the prescription label. It is best to take your dose in the morning with food. Take your medicine at regular intervals. Do not take your medicine more often than directed. Do not stop taking except on your doctor's advice.  Talk to your pediatrician regarding the use of this medicine in children. Special care may be needed.  Overdosage: If you think you have taken too much of this medicine contact a poison control center or emergency room at once.  NOTE: This medicine is only for you. Do not share this medicine with others.  What if I miss a dose?  If you miss a dose, take it as soon as you can. If it is almost time for your next dose, take only that dose. Do not take double or extra doses.  What may interact with this medicine?  -barbiturate medicines for sleep or seizure control  -digoxin  -lithium  -medicines for diabetes  -norepinephrine  -other medicines for high blood pressure  -some pain medicines  -steroid hormones like prednisone, cortisone, hydrocortisone, corticotropin  -tubocurarine  This list may not describe all possible interactions. Give your health care provider a list of all the medicines, herbs, non-prescription drugs, or dietary supplements you use. Also tell them if you smoke, drink alcohol, or use illegal drugs. Some items may interact with your medicine.  What should I watch for while using this medicine?  Visit your doctor or health care professional for regular check ups. Check your blood  pressure as directed. Ask your doctor or health care professional what your blood pressure should be and when you should contact him or her.  You may need to be on a special diet while taking this medicine. Ask your doctor.  You may get drowsy or dizzy. Do not drive, use machinery, or do anything that needs mental alertness until you know how this medicine affects you. Do not stand or sit up quickly, especially if you are an older patient. This reduces the risk of dizzy or fainting spells. Alcohol may interfere with the effect of this medicine. Avoid alcoholic drinks.  This medicine may affect your blood sugar level. If you have diabetes, check with your doctor or health care professional before changing the dose of your diabetic medicine.  This medicine can make you more sensitive to the sun. Keep out of the sun. If you cannot avoid being in the sun, wear protective clothing and use sunscreen. Do not use sun lamps or tanning beds/booths.  What side effects may I notice from receiving this medicine?  Side effects that you should report to your doctor or health care professional as soon as possible:  -allergic reactions like skin rash, itching or hives, swelling of the face, lips, or tongue  -dark urine  -dry mouth  -excess thirst  -fast, irregular heart rate  -fever, chills  -muscle pain, cramps, or spasm  -nausea, vomiting  -redness, blistering, peeling or loosening of the skin, including inside the mouth  -tingling, pain or numbness in the hands or feet  -unusually weak or tired  -yellowing of the eyes or skin  Side effects that usually do not require medical attention (report to your doctor or health care professional if they continue or are bothersome):  -diarrhea or constipation  -headache  -impotence  -loss of appetite  -stomach upset  This list may not describe all possible side effects. Call your doctor for medical advice about side effects. You may report side effects to FDA at 8-956-FDA-7827.  Where should  I keep my medicine?  Keep out of the reach of children.  Store at room temperature between 15 and 30 degrees C (59 and 86 degrees F). Keep container tightly closed. Throw away any unused medicine after the expiration date.  NOTE: This sheet is a summary. It may not cover all possible information. If you have questions about this medicine, talk to your doctor, pharmacist, or health care provider.  © 2018 [employer] (2009-03-24 15:28:48)  Carvedilol tablets  What is this medicine?  CARVEDILOL (REBEKAH ve dil ol) is a beta-blocker. Beta-blockers reduce the workload on the heart and help it to beat more regularly. This medicine is used to treat high blood pressure and heart failure.  This medicine may be used for other purposes; ask your health care provider or pharmacist if you have questions.  COMMON BRAND NAME(S): Coreg  What should I tell my health care provider before I take this medicine?  They need to know if you have any of these conditions:  -circulation problems  -diabetes  -history of heart attack or heart disease  -liver disease  -lung or breathing disease, like asthma or emphysema  -pheochromocytoma  -slow or irregular heartbeat  -thyroid disease  -an unusual or allergic reaction to carvedilol, other beta-blockers, medicines, foods, dyes, or preservatives  -pregnant or trying to get pregnant  -breast-feeding  How should I use this medicine?  Take this medicine by mouth with a glass of water. Follow the directions on the prescription label. It is best to take the tablets with food. Take your doses at regular intervals. Do not take your medicine more often than directed. Do not stop taking except on the advice of your doctor or health care professional.  Talk to your pediatrician regarding the use of this medicine in children. Special care may be needed.  Overdosage: If you think you have taken too much of this medicine contact a poison control center or emergency room at once.  NOTE: This medicine is  only for you. Do not share this medicine with others.  What if I miss a dose?  If you miss a dose, take it as soon as you can. If it is almost time for your next dose, take only that dose. Do not take double or extra doses.  What may interact with this medicine?  This medicine may interact with the following medications:  -certain medicines for blood pressure, heart disease, irregular heart beat  -certain medicines for depression, like fluoxetine or paroxetine  -certain medicines for diabetes, like glipizide or glyburide  -cimetidine  -clonidine  -cyclosporine  -digoxin  -MAOIs like Carbex, Eldepryl, Marplan, Nardil, and Parnate  -reserpine  -rifampin  This list may not describe all possible interactions. Give your health care provider a list of all the medicines, herbs, non-prescription drugs, or dietary supplements you use. Also tell them if you smoke, drink alcohol, or use illegal drugs. Some items may interact with your medicine.  What should I watch for while using this medicine?  Check your heart rate and blood pressure regularly while you are taking this medicine. Ask your doctor or health care professional what your heart rate and blood pressure should be, and when you should contact him or her. Do not stop taking this medicine suddenly. This could lead to serious heart-related effects.  Contact your doctor or health care professional if you have difficulty breathing while taking this drug.  Check your weight daily. Ask your doctor or health care professional when you should notify him/her of any weight gain.  You may get drowsy or dizzy. Do not drive, use machinery, or do anything that requires mental alertness until you know how this medicine affects you. To reduce the risk of dizzy or fainting spells, do not sit or stand up quickly. Alcohol can make you more drowsy, and increase flushing and rapid heartbeats. Avoid alcoholic drinks.  If you have diabetes, check your blood sugar as directed. Tell your  doctor if you have changes in your blood sugar while you are taking this medicine.  If you are going to have surgery, tell your doctor or health care professional that you are taking this medicine.  What side effects may I notice from receiving this medicine?  Side effects that you should report to your doctor or health care professional as soon as possible:  -allergic reactions like skin rash, itching or hives, swelling of the face, lips, or tongue  -breathing problems  -dark urine  -irregular heartbeat  -swollen legs or ankles  -vomiting  -yellowing of the eyes or skin  Side effects that usually do not require medical attention (report to your doctor or health care professional if they continue or are bothersome):  -change in sex drive or performance  -diarrhea  -dry eyes (especially if wearing contact lenses)  -dry, itching skin  -headache  -nausea  -unusually tired  This list may not describe all possible side effects. Call your doctor for medical advice about side effects. You may report side effects to FDA at 7-690-FDA-0982.  Where should I keep my medicine?  Keep out of the reach of children.  Store at room temperature below 30 degrees C (86 degrees F). Protect from moisture. Keep container tightly closed. Throw away any unused medicine after the expiration date.  NOTE: This sheet is a summary. It may not cover all possible information. If you have questions about this medicine, talk to your doctor, pharmacist, or health care provider.  © 2018 [employer] Standard (2014-08-24 14:12:02)  Atorvastatin tablets  What is this medicine?  ATORVASTATIN (a TORE va sta tin) is known as a HMG-CoA reductase inhibitor or 'statin'. It lowers the level of cholesterol and triglycerides in the blood. This drug may also reduce the risk of heart attack, stroke, or other health problems in patients with risk factors for heart disease. Diet and lifestyle changes are often used with this drug.  This medicine may be used for  other purposes; ask your health care provider or pharmacist if you have questions.  COMMON BRAND NAME(S): Lipitor  What should I tell my health care provider before I take this medicine?  They need to know if you have any of these conditions:  -frequently drink alcoholic beverages  -history of stroke, TIA  -kidney disease  -liver disease  -muscle aches or weakness  -other medical condition  -an unusual or allergic reaction to atorvastatin, other medicines, foods, dyes, or preservatives  -pregnant or trying to get pregnant  -breast-feeding  How should I use this medicine?  Take this medicine by mouth with a glass of water. Follow the directions on the prescription label. You can take this medicine with or without food. Take your doses at regular intervals. Do not take your medicine more often than directed.  Talk to your pediatrician regarding the use of this medicine in children. While this drug may be prescribed for children as young as 10 years old for selected conditions, precautions do apply.  Overdosage: If you think you have taken too much of this medicine contact a poison control center or emergency room at once.  NOTE: This medicine is only for you. Do not share this medicine with others.  What if I miss a dose?  If you miss a dose, take it as soon as you can. If it is almost time for your next dose, take only that dose. Do not take double or extra doses.  What may interact with this medicine?  Do not take this medicine with any of the following medications:  -red yeast rice  -telaprevir  -telithromycin  -voriconazole  This medicine may also interact with the following medications:  -alcohol  -antiviral medicines for HIV or AIDS  -boceprevir  -certain antibiotics like clarithromycin, erythromycin, troleandomycin  -certain medicines for cholesterol like fenofibrate or gemfibrozil  -cimetidine  -clarithromycin  -colchicine  -cyclosporine  -digoxin  -female hormones, like estrogens or progestins and birth  control pills  -grapefruit juice  -medicines for fungal infections like fluconazole, itraconazole, ketoconazole  -niacin  -rifampin  -spironolactone  This list may not describe all possible interactions. Give your health care provider a list of all the medicines, herbs, non-prescription drugs, or dietary supplements you use. Also tell them if you smoke, drink alcohol, or use illegal drugs. Some items may interact with your medicine.  What should I watch for while using this medicine?  Visit your doctor or health care professional for regular check-ups. You may need regular tests to make sure your liver is working properly.  Tell your doctor or health care professional right away if you get any unexplained muscle pain, tenderness, or weakness, especially if you also have a fever and tiredness. Your doctor or health care professional may tell you to stop taking this medicine if you develop muscle problems. If your muscle problems do not go away after stopping this medicine, contact your health care professional.  This drug is only part of a total heart-health program. Your doctor or a dietician can suggest a low-cholesterol and low-fat diet to help. Avoid alcohol and smoking, and keep a proper exercise schedule.  Do not use this drug if you are pregnant or breast-feeding. Serious side effects to an unborn child or to an infant are possible. Talk to your doctor or pharmacist for more information.  This medicine may affect blood sugar levels. If you have diabetes, check with your doctor or health care professional before you change your diet or the dose of your diabetic medicine.  If you are going to have surgery tell your health care professional that you are taking this drug.  What side effects may I notice from receiving this medicine?  Side effects that you should report to your doctor or health care professional as soon as possible:  -allergic reactions like skin rash, itching or hives, swelling of the face, lips,  or tongue  -dark urine  -fever  -joint pain  -muscle cramps, pain  -redness, blistering, peeling or loosening of the skin, including inside the mouth  -trouble passing urine or change in the amount of urine  -unusually weak or tired  -yellowing of eyes or skin  Side effects that usually do not require medical attention (report to your doctor or health care professional if they continue or are bothersome):  -constipation  -heartburn  -stomach gas, pain, upset  This list may not describe all possible side effects. Call your doctor for medical advice about side effects. You may report side effects to FDA at 8-441-CMI-5800.  Where should I keep my medicine?  Keep out of the reach of children.  Store at room temperature between 20 to 25 degrees C (68 to 77 degrees F). Throw away any unused medicine after the expiration date.  NOTE: This sheet is a summary. It may not cover all possible information. If you have questions about this medicine, talk to your doctor, pharmacist, or health care provider.  © 2018 [employer]/Gold Standard (2012-11-06 09:18:24)  Discharge Instructions    Discharged to home by car with relative. Discharged via walking, hospital escort: Yes.  Special equipment needed: Not Applicable    Be sure to schedule a follow-up appointment with your primary care doctor or any specialists as instructed.     Discharge Plan:   Smoking Cessation Offered: Patient Refused  Influenza Vaccine Indication: Indicated: Not available from distributor/    I understand that a diet low in cholesterol, fat, and sodium is recommended for good health. Unless I have been given specific instructions below for another diet, I accept this instruction as my diet prescription.   Other diet: Cardiac, diabetic    Special Instructions:   HF Patient Discharge Instructions  · Monitor your weight daily, and maintain a weight chart, to track your weight changes.   · Activity as tolerated, unless your Doctor has ordered otherwise.  Other activity order: as tolerated.  · Follow a low fat, low cholesterol, low salt diet unless instructed otherwise by your Doctor. Read the labels on the back of food products and track your intake of fat, cholesterol and salt.   · Fluid Restriction Yes. If a Fluid Restriction has been ordered by your Doctor, measure fluids with a measuring cup to ensure that you are not exceeding the restriction.   · No smoking.  · Oxygen No. If your Doctor has ordered that you wear Oxygen at home, it is important to wear it as ordered.  · Did you receive an explanation from staff on the importance of taking each of your medications and why it is necessary to keep taking them unless your doctor says to stop? Yes  · Were all of your questions answered about how to manage your heart failure and what to do if you have increased signs and symptoms after you go home? Yes  · Do you feel like your heart failure care team involved you in the care treatment plan and allowed you to make decisions regarding your care while in the hospital and addressed any discharge needs you might have? Yes    See the educational handout provided at discharge for more information on monitoring your daily weight, activity and diet. This also explains more about Heart Failure, symptoms of a flare-up and some of the tests that you have undergone.     Warning Signs of a Flare-Up include:  · Swelling in the ankles or lower legs.  · Shortness of breath, while at rest, or while doing normal activities.   · Shortness of breath at night when in bed, or coughing in bed.   · Requiring more pillows to sleep at night, or needing to sit up at night to sleep.  · Feeling weak, dizzy or fatigued.     When to call your Doctor:  · Call [facility] seven days a week from 8:00 a.m. to 8:00 p.m. for medical questions [phone].  · Call your Primary Care Physician or Cardiologist if:   1. You experience any pain radiating to your jaw or neck.  2. You have any  difficulty breathing.  3. You experience weight gain of 3 lbs in a day or 5 lbs in a week.   4. You feel any palpitations or irregular heartbeats.  5. You become dizzy or lose consciousness.   If you have had an angiogram or had a pacemaker or AICD placed, and experience:  1. Bleeding, drainage or swelling at the surgical / puncture site.  2. Fever greater than 100.0 F  3. Shock from internal defibrillator.  4. Cool and / or numb extremities.      · Is patient discharged on Warfarin / Coumadin?   No       Hypertension  Hypertension is another name for high blood pressure. High blood pressure forces your heart to work harder to pump blood. A blood pressure reading has two numbers, which includes a higher number over a lower number (example: 110/72).  Follow these instructions at home:  · Have your blood pressure rechecked by your doctor.  · Only take medicine as told by your doctor. Follow the directions carefully. The medicine does not work as well if you skip doses. Skipping doses also puts you at risk for problems.  · Do not smoke.  · Monitor your blood pressure at home as told by your doctor.  Contact a doctor if:  · You think you are having a reaction to the medicine you are taking.  · You have repeat headaches or feel dizzy.  · You have puffiness (swelling) in your ankles.  · You have trouble with your vision.  Get help right away if:  · You get a very bad headache and are confused.  · You feel weak, numb, or faint.  · You get chest or belly (abdominal) pain.  · You throw up (vomit).  · You cannot breathe very well.  This information is not intended to replace advice given to you by your health care provider. Make sure you discuss any questions you have with your health care provider.  Document Released: 06/05/2009 Document Revised: 05/25/2017 Document Reviewed: 10/10/2014  [employer] Interactive Patient Education © 2017 [employer]    Sleep Apnea  Sleep apnea is a condition that affects breathing. People with  sleep apnea have moments during sleep when their breathing pauses briefly or gets shallow. Sleep apnea can cause these symptoms:  · Trouble staying asleep.  · Sleepiness or tiredness during the day.  · Irritability.  · Loud snoring.  · Morning headaches.  · Trouble concentrating.  · Forgetting things.  · Less interest in sex.  · Being sleepy for no reason.  · Mood swings.  · Personality changes.  · Depression.  · Waking up a lot during the night to pee (urinate).  · Dry mouth.  · Sore throat.  Follow these instructions at home:  · Make any changes in your routine that your doctor recommends.  · Eat a healthy, well-balanced diet.  · Take over-the-counter and prescription medicines only as told by your doctor.  · Avoid using alcohol, calming medicines (sedatives), and narcotic medicines.  · Take steps to lose weight if you are overweight.  · If you were given a machine (device) to use while you sleep, use it only as told by your doctor.  · Do not use any tobacco products, such as cigarettes, chewing tobacco, and e-cigarettes. If you need help quitting, ask your doctor.  · Keep all follow-up visits as told by your doctor. This is important.  Contact a doctor if:  · The machine that you were given to use during sleep is uncomfortable or does not seem to be working.  · Your symptoms do not get better.  · Your symptoms get worse.  Get help right away if:  · Your chest hurts.  · You have trouble breathing in enough air (shortness of breath).  · You have an uncomfortable feeling in your back, arms, or stomach.  · You have trouble talking.  · One side of your body feels weak.  · A part of your face is hanging down (drooping).  These symptoms may be an emergency. Do not wait to see if the symptoms will go away. Get medical help right away. Call your local emergency services (911 in the [place] Do not drive yourself to the hospital.   This information is not intended to replace advice given to you by your health care provider.  Make sure you discuss any questions you have with your health care provider.  Document Released: 09/26/2009 Document Revised: 08/13/2017 Document Reviewed: 09/26/2016  [employer] Interactive Patient Education © 2017 [employer]    Heart Failure  Heart failure means your heart has trouble pumping blood. This makes it hard for your body to work well. Heart failure is usually a long-term (chronic) condition. You must take good care of yourself and follow your doctor's treatment plan.  HOME CARE  · Take your heart medicine as told by your doctor.  ¨ Do not stop taking medicine unless your doctor tells you to.  ¨ Do not skip any dose of medicine.  ¨ Refill your medicines before they run out.  ¨ Take other medicines only as told by your doctor or pharmacist.  · Stay active if told by your doctor. The elderly and people with severe heart failure should talk with a doctor about physical activity.  · Eat heart-healthy foods. Choose foods that are without trans fat and are low in saturated fat, cholesterol, and salt (sodium). This includes fresh or frozen fruits and vegetables, fish, lean meats, fat-free or low-fat dairy foods, whole grains, and high-fiber foods. Lentils and dried peas and beans (legumes) are also good choices.  · Limit salt if told by your doctor.  · Cook in a healthy way. Roast, grill, broil, bake, poach, steam, or stir-khalil foods.  · Limit fluids as told by your doctor.  · Weigh yourself every morning. Do this after you pee (urinate) and before you eat breakfast. Write down your weight to give to your doctor.  · Take your blood pressure and write it down if your doctor tells you to.  · Ask your doctor how to check your pulse. Check your pulse as told.  · Lose weight if told by your doctor.  · Stop smoking or chewing tobacco. Do not use gum or patches that help you quit without your doctor's approval.  · Schedule and go to doctor visits as told.  · Nonpregnant women should have no more than 1 drink a day.  Men should have no more than 2 drinks a day. Talk to your doctor about drinking alcohol.  · Stop illegal drug use.  · Stay current with shots (immunizations).  · Manage your health conditions as told by your doctor.  · Learn to manage your stress.  · Rest when you are tired.  · If it is really hot outside:  ¨ Avoid intense activities.  ¨ Use air conditioning or fans, or get in a cooler place.  ¨ Avoid caffeine and alcohol.  ¨ Wear loose-fitting, lightweight, and light-colored clothing.  · If it is really cold outside:  ¨ Avoid intense activities.  ¨ Layer your clothing.  ¨ Wear mittens or gloves, a hat, and a scarf when going outside.  ¨ Avoid alcohol.  · Learn about heart failure and get support as needed.  · Get help to maintain or improve your quality of life and your ability to care for yourself as needed.  GET HELP IF:   · You gain weight quickly.  · You are more short of breath than usual.  · You cannot do your normal activities.  · You tire easily.  · You cough more than normal, especially with activity.  · You have any or more puffiness (swelling) in areas such as your hands, feet, ankles, or belly (abdomen).  · You cannot sleep because it is hard to breathe.  · You feel like your heart is beating fast (palpitations).  · You get dizzy or light-headed when you stand up.  GET HELP RIGHT AWAY IF:   · You have trouble breathing.  · There is a change in mental status, such as becoming less alert or not being able to focus.  · You have chest pain or discomfort.  · You faint.  MAKE SURE YOU:   · Understand these instructions.  · Will watch your condition.  · Will get help right away if you are not doing well or get worse.  This information is not intended to replace advice given to you by your health care provider. Make sure you discuss any questions you have with your health care provider.  Document Released: 09/26/2009 Document Revised: 01/08/2016 Document Reviewed: 02/03/2014  [employer] Interactive Patient  Education © 2017 [employer]  Echocardiogram  An echocardiogram, or echocardiography, uses sound waves (ultrasound) to produce an image of your heart. The echocardiogram is simple, painless, obtained within a short period of time, and offers valuable information to your health care provider. The images from an echocardiogram can provide information such as:  · Evidence of coronary artery disease (CAD).  · Heart size.  · Heart muscle function.  · Heart valve function.  · Aneurysm detection.  · Evidence of a past heart attack.  · Fluid buildup around the heart.  · Heart muscle thickening.  · Assess heart valve function.  Tell a health care provider about:  · Any allergies you have.  · All medicines you are taking, including vitamins, herbs, eye drops, creams, and over-the-counter medicines.  · Any problems you or family members have had with anesthetic medicines.  · Any blood disorders you have.  · Any surgeries you have had.  · Any medical conditions you have.  · Whether you are pregnant or may be pregnant.  What happens before the procedure?  No special preparation is needed. Eat and drink normally.  What happens during the procedure?  · In order to produce an image of your heart, gel will be applied to your chest and a wand-like tool (transducer) will be moved over your chest. The gel will help transmit the sound waves from the transducer. The sound waves will harmlessly bounce off your heart to allow the heart images to be captured in real-time motion. These images will then be recorded.  · You may need an IV to receive a medicine that improves the quality of the pictures.  What happens after the procedure?  You may return to your normal schedule including diet, activities, and medicines, unless your health care provider tells you otherwise.  This information is not intended to replace advice given to you by your health care provider. Make sure you discuss any questions you have with your health care  "provider.  Document Released: 12/15/2001 Document Revised: 08/05/2017 Document Reviewed: 08/25/2014  [employer] Interactive Patient Education © 2017 [employer]  Diets for Diabetes, Food Labeling  Look at food labels to help you decide how much of a product you can eat. You will want to check the amount of total carbohydrate in a serving to see how the food fits into your meal plan. In the list of ingredients, the ingredient present in the largest amount by weight must be listed first, followed by the other ingredients in descending order.  STANDARD OF IDENTITY  Most products have a list of ingredients. However, foods that the Food and Drug Administration (FDA) has given a standard of identity do not need a list of ingredients. A standard of identity means that a food must contain certain ingredients if it is called a particular name. Examples are mayonnaise, peanut butter, ketchup, jelly, and cheese.  LABELING TERMS  There are many terms found on food labels. Some of these terms have specific definitions. Some terms are regulated by the FDA, and the FDA has clearly specified how they can be used. Others are not regulated or well-defined and can be misleading and confusing.  SPECIFICALLY DEFINED TERMS  Nutritive Sweetener.  · A sweetener that contains calories,such as table sugar or honey.  Nonnutritive Sweetener.  · A sweetener with few or no calories,such as saccharin, aspartame, sucralose, and cyclamate.  LABELING TERMS REGULATED BY THE FDA  Free.  · The product contains only a tiny or small amount of fat, cholesterol, sodium, sugar, or calories. For example, a \"fat-free\" product will contain less than 0.5 g of fat per serving.  Low.  · A food described as \"low\" in fat, saturated fat, cholesterol, sodium, or calories could be eaten fairly often without exceeding dietary guidelines. For example, \"low in fat\" means no more than 3 g of fat per serving.  Lean.  · \"Lean\" and \"extra lean\" are U.S. Department of " "Agriculture (USDA) terms for use on meat and poultry products. \"Lean\" means the product contains less than 10 g of fat, 4 g of saturated fat, and 95 mg of cholesterol per serving. \"Lean\" is not as low in fat as a product labeled \"low.\"  Extra Lean.  · \"Extra lean\" means the product contains less than 5 g of fat, 2 g of saturated fat, and 95 mg of cholesterol per serving. While \"extra lean\" has less fat than \"lean,\" it is still higher in fat than a product labeled \"low.\"  Reduced, Less, Fewer.  · A diet product that contains 25% less of a nutrient or calories than the regular version. For example, hot dogs might be labeled \"25% less fat than our regular hot dogs.\"  Light/Lite.  · A diet product that contains  fewer calories or ½ the fat of the original. For example, \"light in sodium\" means a product with ½ the usual sodium.  More.  · One serving contains at least 10% more of the daily value of a vitamin, mineral, or fiber than usual.  Good Source Of.  · One serving contains 10% to 19% of the daily value for a particular vitamin, mineral, or fiber.  Excellent Source Of.  · One serving contains 20% or more of the daily value for a particular nutrient. Other terms used might be \"high in\" or \"rich in.\"  Enriched or Fortified.  · The product contains added vitamins, minerals, or protein. Nutrition labeling must be used on enriched or fortified foods.  Imitation.  · The product has been altered so that it is lower in protein, vitamins, or minerals than the usual food,such as imitation peanut butter.  Total Fat.  · The number listed is the total of all fat found in a serving of the product. Under total fat, food labels must list saturated fat and trans fat, which are associated with raising bad cholesterol and an increased risk of heart blood vessel disease.  Saturated Fat.  · Mainly fats from animal-based sources. Some examples are red meat, cheese, cream, whole milk, and coconut oil.  Trans Fat.  · Found in some fried " "snack foods, packaged foods, and fried restaurant foods. It is recommended you eat as close to 0 g of trans fat as possible, since it raises bad cholesterol and lowers good cholesterol.  Polyunsaturated and Monounsaturated Fats.  · More healthful fats. These fats are from plant sources.  Total Carbohydrate.  · The number of carbohydrate grams in a serving of the product. Under total carbohydrate are listed the other carbohydrate sources, such as dietary fiber and sugars.  Dietary Fiber.  · A carbohydrate from plant sources.  Sugars.  · Sugars listed on the label contain all naturally occurring sugars as well as added sugars.  LABELING TERMS NOT REGULATED BY THE FDA  Sugarless.  · Table sugar (sucrose) has not been added. However, the  may use another form of sugar in place of sucrose to cristofer the product. For example, sugar alcohols are used to cristofer foods. Sugar alcohols are a form of sugar but are not table sugar. If a product contains sugar alcohols in place of sucrose, it can still be labeled \"sugarless.\"  Low Salt, Salt-Free, Unsalted, No Salt, No Salt Added, Without Added Salt.  · Food that is usually processed with salt has been made without salt. However, the food may contain sodium-containing additives, such as preservatives, leavening agents, or flavorings.  Natural.  · This term has no legal meaning.  Organic.  · Foods that are certified as organic have been inspected and approved by the USDA to ensure they are produced without pesticides, fertilizers containing synthetic ingredients, bioengineering, or ionizing radiation.  Document Released: 12/20/2004 Document Revised: 03/11/2013 Document Reviewed: 07/08/2010  [employer]® Patient Information ©2014 [employer], [facility].    Tips for Eating Away From Home If You Have Diabetes  Controlling your level of blood glucose, also known as blood sugar, can be challenging. It can be even more difficult when you do not prepare your own meals. The following " "tips can help you manage your diabetes when you eat away from home.  Planning ahead  Plan ahead if you know you will be eating away from home:  · Ask your health care provider how to time meals and medicine if you are taking insulin.  · Make a list of restaurants near you that offer healthy choices. If they have a carry-out menu, take it home and plan what you will order ahead of time.  · Look up the restaurant you want to eat at online. Many chain and fast-food restaurants list nutritional information online. Use this information to choose the healthiest options and to calculate how many carbohydrates will be in your meal.  · Use a carbohydrate-counting book or mobile hayden to look up the carbohydrate content and serving size of the foods you want to eat.  · Become familiar with serving sizes and learn to recognize how many servings are in a portion. This will allow you to estimate how many carbohydrates you can eat.  Free foods  A \"free food\" is any food or drink that has less than 5 g of carbohydrates per serving. Free foods include:  · Many vegetables.  · Hard boiled eggs.  · Nuts or seeds.  · Olives.  · Cheeses.  · Meats.  These types of foods make good appetizer choices and are often available at salad bars. Lemon juice, vinegar, or a low-calorie salad dressing of fewer than 20 calories per serving can be used as a \"free\" salad dressing.  Choices to reduce carbohydrates  · Substitute nonfat sweetened yogurt with a sugar-free yogurt. Yogurt made from soy milk may also be used, but you will still want a sugar-free or plain option to choose a lower carbohydrate amount.  · Ask your  to take away the bread basket or chips from your table.  · Order fresh fruit. A salad bar often offers fresh fruit choices. Avoid canned fruit because it is usually packed in sugar or syrup.  · Order a salad, and eat it without dressing. Or, create a \"free\" salad dressing.  · Ask for substitutions. For example, instead of [place] " fries, request an order of a vegetable such as salad, green beans, or broccoli.  Other tips  · If you take insulin, take the insulin once your food arrives to your table. This will ensure your insulin and food are timed correctly.  · Ask your  about the portion size before your order, and ask for a take-out box if the portion has more servings than you should have. When your food comes, leave the amount you should have on the plate, and put the rest in the take-out box.  · Consider splitting an entree with someone and ordering a side salad.  This information is not intended to replace advice given to you by your health care provider. Make sure you discuss any questions you have with your health care provider.  Document Released: 12/18/2006 Document Revised: 05/25/2017 Document Reviewed: 03/17/2015  [employer] Interactive Patient Education © 2017 [employer]    Diabetes and Foot Care  Diabetes may cause you to have problems because of poor blood supply (circulation) to your feet and legs. This may cause the skin on your feet to become thinner, break easier, and heal more slowly. Your skin may become dry, and the skin may peel and crack. You may also have nerve damage in your legs and feet causing decreased feeling in them. You may not notice minor injuries to your feet that could lead to infections or more serious problems. Taking care of your feet is one of the most important things you can do for yourself.  Follow these instructions at home:  · Wear shoes at all times, even in the house. Do not go barefoot. Bare feet are easily injured.  · Check your feet daily for blisters, cuts, and redness. If you cannot see the bottom of your feet, use a mirror or ask someone for help.  · Wash your feet with warm water (do not use hot water) and mild soap. Then pat your feet and the areas between your toes until they are completely dry. Do not soak your feet as this can dry your skin.  · Apply a moisturizing lotion or  petroleum jelly (that does not contain alcohol and is unscented) to the skin on your feet and to dry, brittle toenails. Do not apply lotion between your toes.  · Trim your toenails straight across. Do not dig under them or around the cuticle. File the edges of your nails with an emery board or nail file.  · Do not cut corns or calluses or try to remove them with medicine.  · Wear clean socks or stockings every day. Make sure they are not too tight. Do not wear knee-high stockings since they may decrease blood flow to your legs.  · Wear shoes that fit properly and have enough cushioning. To break in new shoes, wear them for just a few hours a day. This prevents you from injuring your feet. Always look in your shoes before you put them on to be sure there are no objects inside.  · Do not cross your legs. This may decrease the blood flow to your feet.  · If you find a minor scrape, cut, or break in the skin on your feet, keep it and the skin around it clean and dry. These areas may be cleansed with mild soap and water. Do not cleanse the area with peroxide, alcohol, or iodine.  · When you remove an adhesive bandage, be sure not to damage the skin around it.  · If you have a wound, look at it several times a day to make sure it is healing.  · Do not use heating pads or hot water bottles. They may burn your skin. If you have lost feeling in your feet or legs, you may not know it is happening until it is too late.  · Make sure your health care provider performs a complete foot exam at least annually or more often if you have foot problems. Report any cuts, sores, or bruises to your health care provider immediately.  Contact a health care provider if:  · You have an injury that is not healing.  · You have cuts or breaks in the skin.  · You have an ingrown nail.  · You notice redness on your legs or feet.  · You feel burning or tingling in your legs or feet.  · You have pain or cramps in your legs and feet.  · Your legs or  feet are numb.  · Your feet always feel cold.  Get help right away if:  · There is increasing redness, swelling, or pain in or around a wound.  · There is a red line that goes up your leg.  · Pus is coming from a wound.  · You develop a fever or as directed by your health care provider.  · You notice a bad smell coming from an ulcer or wound.  This information is not intended to replace advice given to you by your health care provider. Make sure you discuss any questions you have with your health care provider.  Document Released: 12/15/2001 Document Revised: 05/25/2017 Document Reviewed: 05/27/2014  [employer] Interactive Patient Education © 2017 [employer].  Diabetes and Exercise  Diabetes mellitus is a common, chronic disease, in which the pancreas is unable to adequately control blood glucose (sugar) levels. There are 2 types of diabetes. Type 1 diabetes patients are unable to produce insulin, a hormone that causes sugar in the blood to be stored in the body. People with type 1 diabetes may compensate by giving themselves injections of insulin. Type 2 diabetes involves not producing adequate amounts of insulin to control blood glucose levels. People with type 2 diabetes control their blood glucose by monitoring their food intake or by taking medicine. Exercise is an important part of diabetes treatment. During exercise, the muscles use a greater amount of glucose from the blood for energy. This lowers your blood glucose, which is the same effect you would get from taking insulin. It has been shown that endurance athletes are more sensitive to insulin than inactive people.  SYMPTOMS   Many people with a mild case of diabetes have no symptoms. However, if left uncontrolled, diabetes can lead to several complications that could be prevented with treatment of the disease.  General symptoms of diabetes include:   · Frequent urination (polyuria).  · Frequent thirst and drinking (polydipsia).  · Increased food  consumption (polyphagia).  · Fatigue.  · Poor exercise performance.  · Blurred vision.  · Inflammation of the vagina (vaginitis) caused by fungal infections.  · Skin infections (uncommon).  · Numbness in the feet, caused by nerve injury.  · Kidney disease.  CAUSES   The cause of most cases of diabetes is unknown. In children, diabetes is often due to an autoimmune response to the cells in the pancreas that make insulin. It is also linked with other diseases, such as cystic fibrosis. Diabetes may have a genetic link.  PREVENTION  · Athletes should strive to begin exercise with blood glucose in a well-controlled state.  · Feet should always be kept clean and dry.  · Activities in which low blood sugar levels cannot be treated easily (scuba diving, rock climbing, swimming) should be avoided.  · Anticipate alterations in diet or training to avoid low blood sugar (hypoglycemia) and high blood sugar (hyperglycemia).  · Athletes should try to increase sugar consumption after strenuous exercise to avoid hypoglycemia.  · Short-acting insulin should not be injected into an actively exercising muscle. The athlete should rest the injection site for about 1 hour after exercise.  · Patients with diabetes should get routine checkups of the feet to prevent complications.  PROGNOSIS   Exercise provides many benefits to the person with diabetes:   · Reduced body fat.  · Lower blood pressure.  · Often, reduced need for medicines.  · Improved exercise tolerance.  · Lower insulin levels.  · Weight loss.  · Improved lipid profile (decreased cholesterol and low-density lipoproteins).  RELATED COMPLICATIONS   If performed incorrectly, exercise can result in complications of diabetes:   · Poor control of blood sugar, when exercise is performed at the wrong time.  · Increase in renal disease, from loss of body fluids (dehydration).  · Increased risk of nerve injury (neuropathy) when performing exercises that increase foot  injury.  · Increased risk of eye problems when performing activities that involve breath holding or lowering or jarring the head.  · Increased risk of sudden death from exercise in patients with heart disease.  · Worsening of hypertension with heavy lifting (more than 10 lb/4.5 kg). Altered blood glucose and insulin dose as a result of mild illness that produces loss of appetite.  · Altered uptake of insulin after injection when insulin injection site is changed.  NOTE: Exercise can lower blood glucose effectively, but the effects are short-lasting (no more than a couple of days). Exercise has been shown to improve your sensitivity to insulin. This may alter how your body responds to a given dose of injected insulin. It is important for every patient with diabetes to know how his or her body may react to exercise, and to adjust insulin dosages accordingly.  TREATMENT  · Eat about 1 to 3 hours before exercise.  · Check blood glucose immediately before and after exercise.  · Stop exercise if blood glucose is more than 250 mg/dL.  · Stop exercise if blood glucose is less than 100 mg/dL.  · Do not exercise within 1 hour of an insulin injection.  · Be prepared to treat low blood glucose while exercising. Keep some sugar product with you, such as a candy bar.  · For prolonged exercise, use a sports drink to maintain your glucose level.  · Replace used-up glucose in the body after exercise.  · Consume fluids during and after exercise to avoid dehydration.  SEEK MEDICAL CARE IF:  · You have vision changes after a run.  · You notice a loss of sensation in your feet after exercise.  · You have increased numbness, tingling, or pins and needles sensations after exercise.  · You have chest pain during or after exercise.  · You have a fast, irregular heartbeat (palpitations) during or after exercise.  · Your exercise tolerance gets worse.  · You have fainting or dizzy spells for brief periods during or after exercise.     This  information is not intended to replace advice given to you by your health care provider. Make sure you discuss any questions you have with your health care provider.     Document Released: 12/18/2006 Document Revised: 01/08/2016 Document Reviewed: 02/16/2016  [employer] Interactive Patient Education ©2016 [employer]    Type 2 Diabetes Mellitus, Diagnosis, Adult  Type 2 diabetes (type 2 diabetes mellitus) is a long-term (chronic) disease. It may be caused by one or both of these problems:  · Your body does not make enough of a hormone called insulin.  · Your body does not react in a normal way to insulin that it makes.  Insulin lets sugars (glucose) go into cells in the body. This gives you energy. If you have type 2 diabetes, sugars cannot get into cells. This causes high blood sugar (hyperglycemia).  Your doctor will set treatment goals for you. Generally, you should have these blood sugar levels:  · Before meals (preprandial):  mg/dL (4.4-7.2 mmol/L).  · After meals (postprandial): below 180 mg/dL (10 mmol/L).  · A1c (hemoglobin A1c) level: less than 7%.  Follow these instructions at home:  Questions to Ask Your Doctor   You may want to ask these questions:  · Do I need to meet with a diabetes educator?  · Where can I find a support group for people with diabetes?  · What equipment will I need to care for myself at home?  · What diabetes medicines do I need? When should I take them?  · How often do I need to check my blood sugar?  · What number can I call if I have questions?  · When is my next doctor's visit?  General instructions  · Take over-the-counter and prescription medicines only as told by your doctor.  · Keep all follow-up visits as told by your doctor. This is important.  Contact a doctor if:  · Your blood sugar is at or above 240 mg/dL (13.3 mmol/L) for 2 days in a row.  · You have been sick or have had a fever for 2 days or more and you are not getting better.  · You have any of these problems  for more than 6 hours:  ¨ You cannot eat or drink.  ¨ You feel sick to your stomach (nauseous).  ¨ You throw up (vomit).  ¨ You have watery poop (diarrhea).  Get help right away if:  · Your blood sugar is lower than 54 mg/dL (3 mmol/L).  · You get confused.  · You have trouble:  ¨ Thinking clearly.  ¨ Breathing.  · You have moderate or large ketone levels in your pee (urine).  This information is not intended to replace advice given to you by your health care provider. Make sure you discuss any questions you have with your health care provider.  Document Released: 09/26/2009 Document Revised: 05/25/2017 Document Reviewed: 01/20/2017  [employer] Interactive Patient Education © 2017 [employer]      Depression / Suicide Risk    As you are discharged from this [facility] facility, it is important to learn how to keep safe from harming yourself.    Recognize the warning signs:  · Abrupt changes in personality, positive or negative- including increase in energy   · Giving away possessions  · Change in eating patterns- significant weight changes-  positive or negative  · Change in sleeping patterns- unable to sleep or sleeping all the time   · Unwillingness or inability to communicate  · Depression  · Unusual sadness, discouragement and loneliness  · Talk of wanting to die  · Neglect of personal appearance   · Rebelliousness- reckless behavior  · Withdrawal from people/activities they love  · Confusion- inability to concentrate     If you or a loved one observes any of these behaviors or has concerns about self-harm, here's what you can do:  · Talk about it- your feelings and reasons for harming yourself  · Remove any means that you might use to hurt yourself (examples: pills, rope, extension cords, firearm)  · Get professional help from the community (Mental Health, Substance Abuse, psychological counseling)  · Do not be alone:Call your Safe Contact- someone whom you trust who will be there for you.  · Call your local  CRISIS HOTLINE 626-4958 or 368-972-5521  · Call your local Children's Mobile Crisis Response Team [place] (239) 695-5502 or www.[employer]  · Call the toll free National Suicide Prevention Hotlines   · National Suicide Prevention Lifeline 847-998-YGRD (4523)  · National whoplusyou Line Network 800-SUICIDE (133-1964)

## 2019-08-23 ENCOUNTER — OFFICE VISIT (OUTPATIENT)
Dept: MEDICAL GROUP | Facility: PHYSICIAN GROUP | Age: 39
End: 2019-08-23
Payer: COMMERCIAL

## 2019-08-23 VITALS
BODY MASS INDEX: 49.76 KG/M2 | DIASTOLIC BLOOD PRESSURE: 76 MMHG | TEMPERATURE: 97.8 F | SYSTOLIC BLOOD PRESSURE: 112 MMHG | OXYGEN SATURATION: 93 % | HEART RATE: 96 BPM | WEIGHT: 299 LBS

## 2019-08-23 DIAGNOSIS — G47.30 SLEEP-DISORDERED BREATHING: ICD-10-CM

## 2019-08-23 DIAGNOSIS — D75.1 POLYCYTHEMIA: ICD-10-CM

## 2019-08-23 DIAGNOSIS — E11.65 UNCONTROLLED TYPE 2 DIABETES MELLITUS WITH HYPERGLYCEMIA (HCC): ICD-10-CM

## 2019-08-23 DIAGNOSIS — Z09 HOSPITAL DISCHARGE FOLLOW-UP: ICD-10-CM

## 2019-08-23 DIAGNOSIS — E66.01 MORBID OBESITY (HCC): ICD-10-CM

## 2019-08-23 DIAGNOSIS — I10 HYPERTENSION, UNSPECIFIED TYPE: ICD-10-CM

## 2019-08-23 DIAGNOSIS — I50.9 ACUTE CONGESTIVE HEART FAILURE, UNSPECIFIED HEART FAILURE TYPE (HCC): ICD-10-CM

## 2019-08-23 PROCEDURE — 99214 OFFICE O/P EST MOD 30 MIN: CPT | Performed by: PHYSICIAN ASSISTANT

## 2019-08-23 RX ORDER — LANCING DEVICE
1 EACH MISCELLANEOUS DAILY
Qty: 100 EACH | Refills: 3 | Status: SHIPPED | OUTPATIENT
Start: 2019-08-23 | End: 2021-09-09

## 2019-08-23 RX ORDER — METFORMIN HYDROCHLORIDE 500 MG/1
1000 TABLET, EXTENDED RELEASE ORAL 2 TIMES DAILY
Qty: 360 TAB | Refills: 1 | Status: SHIPPED | OUTPATIENT
Start: 2019-08-23 | End: 2020-02-20

## 2019-08-23 ASSESSMENT — PATIENT HEALTH QUESTIONNAIRE - PHQ9: CLINICAL INTERPRETATION OF PHQ2 SCORE: 0

## 2019-08-23 NOTE — LETTER
August 23, 2019         Patient: John Wills   YOB: 1980   Date of Visit: 8/23/2019           To Whom it May Concern:    John Wills was seen in my clinic on 8/23/2019. Please excuse his absence from work on 8/19/19 through 8/23/19. He may return to work without restrictions on 8/26/19. Thank you.     If you have any questions or concerns, please don't hesitate to call.        Sincerely,           Yakelin Ramey P.A.-C.  Electronically Signed

## 2019-08-23 NOTE — PROGRESS NOTES
Subjective:   John Wills is a 39 y.o. male here today for hospital discharge follow-up for CHF exacerbation. Is a new patient to me and is also establishing care today.    Previous PCP: Dr. Baron - has not been seen in >10 years    HPI:    Patient presents to the office today in follow-up from recent hospitalization to [facility] from 8/19/2019 to 8/21/2019.  Patient was hospitalized for CHF exacerbation.  He originally presented to the urgent care with complaints of bilateral edema and shortness of breath, and was found to have extremely elevated blood pressure of 208/120.  He was subsequently evaluated in the ER and found to be in heart failure exacerbation with elevated BNP and troponin.  Echocardiogram showed preserved ejection fraction.  He was diuresed with Lasix and started on multiple new medications including lisinopril 20 mg daily, carvedilol 12.5 mg twice daily, spironolactone 25 mg daily, chlorthalidone 25 mg daily, and Lasix 40 mg daily. Is doing fine on all the medications. He has been having trouble adhering to the fluid restriction that he was put on. States was told to only have up to 32 oz. Of fluid per day which has been very difficult for him. He states he is constantly thirsty. He has a follow-up scheduled with cardiology, but not until the end of September, which he is concerned about, as he was hoping to get in much sooner. Since discharge, he has been feeling much better. The swelling has gone down quite a bit, he continues to lose weight, and shortness of breath has resolved.    While in the hospital, he was also given therapeutic phlebotomy for polycythemia. There was concern for sleep apnea. Has never had sleep study before. Admits to snoring, witnessed apnea.      He was also found to be a type II diabetic with elevated blood sugar and A1c of 9.3, so subsequently started on metformin.  Is taking 500 mg twice daily. Has experienced diarrhea with this.        Current medicines (including changes today)  Current Outpatient Medications   Medication Sig Dispense Refill   • metFORMIN ER (GLUCOPHAGE XR) 500 MG TABLET SR 24 HR Take 2 Tabs by mouth 2 times a day. 360 Tab 1   • [employer] LANCETS Misc 1 Each by Does not apply route every day. 100 Each 3   • Blood Glucose Test Strips Test strips order: Test strips for [employer] brand meter. Sig: use every morning before breakfast and prn ssx high or low sugar 100 Strip 3   • atorvastatin (LIPITOR) 20 MG Tab Take 1 Tab by mouth every evening for 31 days. 30 Tab 1   • chlorthalidone (HYGROTON) 25 MG Tab Take 1 Tab by mouth every day for 31 days. 30 Tab 0   • furosemide (LASIX) 40 MG Tab Take 1 Tab by mouth every day for 31 days. 30 Tab 0   • lisinopril (PRINIVIL) 20 MG Tab Take 1 Tab by mouth every day for 30 days. 30 Tab 1   • carvedilol (COREG) 12.5 MG Tab Take 1 Tab by mouth 2 times a day for 31 days. 60 Tab 1   • spironolactone (ALDACTONE) 25 MG Tab Take 1 Tab by mouth every day for 30 days. 30 Tab 0     No current facility-administered medications for this visit.      He  has a past medical history of Allergy and Uncontrolled type 2 diabetes mellitus (HCC) (8/23/2019). He also has no past medical history of Asthma.    ROS  Pulmonary ROS: No shortness of breath  Cardiovascular ROS: No chest pain     Objective:     /76 (BP Location: Left arm, Patient Position: Sitting, BP Cuff Size: Large adult)   Pulse 96   Temp 36.6 °C (97.8 °F)   Wt (!) 135.6 kg (299 lb)   SpO2 93%  Body mass index is 49.76 kg/m².     Physical Exam:  Constitutional: Alert, morbidly obese but otherwise well-appearing, no distress.  Skin: No rashes in visible areas.  Eye: Pupils are equal and round, conjunctiva clear, lids normal.  ENMT: Lips without lesions, moist mucus membranes.  Respiratory: Unlabored respiratory effort, lungs clear to auscultation, no wheezes, no rhonchi.  Cardiovascular: Normal S1, S2, no murmur, 1-2+ bilateral lower  extremity edema.      Assessment and Plan:   The following treatment plan was discussed    1. Hospital discharge follow-up  Hospital records reviewed including discharge summary, lab results, and imaging.  Recommendations as per below.    2. Acute congestive heart failure, unspecified heart failure type (HCC)  New problem to me, improving with medical management.  Is now on multiple new medications including several diuretics and doing well.  Swelling has improved greatly and he is no longer short of breath.  He does need close follow-up with cardiology.  We were able to get him in early next month with the heart failure clinic.  Discussed that he can increase his fluid consumption to 1.5 L / 6 cups daily.  Polydipsia likely due to elevated blood sugar.  I have also recommended referral to dietitian, as he needs to be on heart failure diet that is diabetic friendly and also low calorie, as he would benefit from significant weight loss.  - REFERRAL TO [facility]PROVEMENT [facility]GRAMS (HIP) Services Requested: Registered Dietitian for Medical Nutrition Therapy; Reason for Visit: Overweight/Obesity, Medical Condition Requiring Nutrition Counseling, Management of Chronic Condition    3. Hypertension, unspecified type  New problem to me, well-controlled since addition of multiple new medications.  Blood pressure today in the office is normal at 112/76.  He will continue on lisinopril, carvedilol, furosemide, chlorthalidone, and spironolactone.  Follow-up with cardiology as scheduled.  - REFERRAL TO SLEEP STUDIES  - REFERRAL TO [facility] IMPROVEMENT PROGRAMS (HIP) Services Requested: Registered Dietitian for Medical Nutrition Therapy; Reason for Visit: Overweight/Obesity, Medical Condition Requiring Nutrition Counseling, Management of Chronic Condition    4. Polycythemia  New problem to me, uncontrolled.  Most likely cause of this is uncontrolled sleep apnea.  He is at high risk for this given his morbid obesity.   Sleep apnea likely been contributing to his uncontrolled hypertension as well.  Will order referral to sleep studies.  - REFERRAL TO SLEEP STUDIES    5. Sleep-disordered breathing  New problem to me, uncontrolled.  Recommend sleep medicine referral as described above.  - REFERRAL TO SLEEP STUDIES    6. Morbid obesity (HCC)  New problem to me, uncontrolled.  BMI is 49.76.  Would benefit from significant weight loss.  We discussed dietitian referral to help him with his diet which he is agreeable with.  - REFERRAL TO SLEEP STUDIES  - REFERRAL TO [facility] (HIP) Services Requested: Registered Dietitian for Medical Nutrition Therapy; Reason for Visit: Overweight/Obesity, Medical Condition Requiring Nutrition Counseling, Management of Chronic Condition    7. Uncontrolled type 2 diabetes mellitus with hyperglycemia (HCC)  New problem to me, uncontrolled with recent A1c of 9.3.  He is requesting referral to endocrinology which I have ordered.  For now, will continue on the metformin with gradual dose increase to 2000 mg twice daily.  I did switch him to the extended release formulation which hopefully will cut down a bit on the diarrhea.  He was given instructions to gradually titrate this upwards.  I did also refer to diabetes education classes and refilled his testing strips and lancets.  - REFERRAL TO [facility] (HIP) Services Requested: Registered Dietitian for Medical Nutrition Therapy; Reason for Visit: Overweight/Obesity, Medical Condition Requiring Nutrition Counseling, Management of Chronic Condition  - REFERRAL TO ENDOCRINOLOGY  - REFERRAL TO DIABETIC EDUCATION Diabetes Self Management Education / Training (DSME/T) and Medical Nutrition Therapy (MNT): Initial Group DSME/MNT as authorized by payor, Follow-Up DSME/MNT as authorized by payor; DSME/T Content: Monitoring Diabete...  - metFORMIN ER (GLUCOPHAGE XR) 500 MG TABLET SR 24 HR; Take 2 Tabs by mouth 2 times a  day.  Dispense: 360 Tab; Refill: 1  - [employer] LANCETS Misc; 1 Each by Does not apply route every day.  Dispense: 100 Each; Refill: 3  - Blood Glucose Test Strips; Test strips order: Test strips for [employer] brand meter. Sig: use every morning before breakfast and prn ssx high or low sugar  Dispense: 100 Strip; Refill: 3      Followup: Return in about 1 month (around 9/23/2019) for f/u DM, HTN; Short.    Yakelin Ramey P.A.-C.

## 2019-08-23 NOTE — PATIENT INSTRUCTIONS
-Increase your metformin as follows:  -in 1 week, increase to 2 tablets in the morning and 2 tablets at night  -in another 1 week, increase to 2 tablets in the morning and 2 tablets at night    -Can increase fluid consumption to no more than 6 cups per day (1420 ml)    Preventing Heart Failure  Heart failure is a condition in which the heart has trouble pumping blood. This may mean that the heart cannot pump enough blood out to the body, or that the heart does not fill up with enough blood. Either of those problems can lead to symptoms such as fatigue, trouble breathing, and swelling throughout the body.  This is a common medical condition that affects not only the heart, but the entire body. Making certain nutrition and lifestyle changes can help you prevent heart failure and avoid serious health problems.  What nutrition changes can be made?  · If you are overweight or obese, reduce how many calories you eat each day so that you lose weight. Work with your health care provider or a diet and nutrition specialist (dietitian) to determine how many calories you need each day.  · Eat foods that are low in salt (sodium). Avoid adding extra salt to foods.  · Eat a well-balanced diet that includes a lot of:  ¨ Fresh fruits and vegetables.  ¨ Whole grains.  ¨ Lean meats.  ¨ Beans.  ¨ Fat-free or low-fat dairy products.  · Avoid foods that contain a lot of:  ¨ Trans fats.  ¨ Saturated fats.  ¨ Sugar.  ¨ Cholesterol.  What lifestyle changes can be made?  · Do not use any products that contain nicotine or tobacco, such as cigarettes and e-cigarettes. If you need help quitting or reducing how much you smoke, ask your health care provider.  · Stop using alcohol, or limit alcohol intake to no more than 1 drink a day for nonpregnant women and 2 drinks a day for men. One drink equals 12 oz of beer, 5 oz of wine, or 1½ oz of hard liquor.  · Exercise for at least 150 minutes each week, or as much as told by your health care  provider.  ¨ Do moderate-intensity exercise, such as brisk walking, bicycling, or water aerobics.  ¨ Ask your health care provider which activities are safe for you.  · See a health care provider regularly for screening and wellness checks. Know your heart health indicators, such as:  ¨ Blood pressure.  ¨ Cholesterol levels.  ¨ Blood sugar (glucose) levels.  ¨ Weight and BMI.  · If you have diabetes, manage your condition and follow your treatment plan as instructed.  · Try to get 7-9 hours of sleep each night. To help with sleep:  ¨ Keep your bedroom cool and dark.  ¨ Do not eat a heavy meal during the hour before you go to bed.  ¨ Do not drink alcohol or caffeinated drinks before bed.  ¨ Avoid screen time before bedtime. This means avoiding television, computers, tablets, and cell phones.  · Find ways to relax and manage stress. These may include:  ¨ Breathing exercises.  ¨ Meditation.  ¨ Yoga.  ¨ Listening to music.  Why are these changes important?  · A well-balanced diet with the appropriate amount of calories can keep your body weight at a healthy level, which reduces strain on your heart.  · A low-sodium diet can help keep your blood pressure in a normal range and keep your blood vessels working properly.  · Quitting smoking and limiting alcohol intake can reduce harmful effects that these substances have on your heart and blood vessels.  · Regular exercise can keep your heart strong so it can pump blood normally.  · Managing diabetes helps your blood circulate and can help you maintain a healthy weight.  · Managing stress helps to reduce the risk of high blood pressure and heart problems.  What can happen if changes are not made?  Heart failure can cause very serious problems that may get worse over time, such as:  · Extreme fatigue during normal physical activities.  · Shortness of breath or trouble breathing.  · Swelling in your abdomen, legs, ankles, feet, or neck.  · Fluid buildup throughout the  body.  · Weight gain.  · Cough.  · Frequent urination.  What can I do to lower my risk?  You may be able to lower your risk of heart failure by:  · Losing weight or keeping your weight under control.  · Working with your health care provider to manage your:  ¨ Cholesterol.  ¨ Blood pressure.  ¨ Diabetes, if this applies.  · Eating a healthy diet.  · Exercising regularly.  · Avoiding unhealthy habits, such as smoking, drinking, or using drugs.  · Getting plenty of sleep.  · Managing your stress.  How is this treated?  Heart failure cannot be cured except by heart transplant, but treatment can help to improve your quality of life. Treatment may include:  · Medicines to help:  ¨ Lower blood pressure.  ¨ Remove excess sodium from your body.  ¨ Relax blood vessels.  ¨ Improve heart function.  ¨ Control other symptoms of heart failure.  · Surgery to open blocked coronary arteries or repair damaged heart valves.  · Implantation of a biventricular pacemaker to improve heart muscle function (cardiac resynchronization therapy). This device paces both the right ventricle and left ventricle.  · Implantation of a device to treat serious abnormal heart rhythms (implantable cardioverter defibrillator, ICD).  · Implantation of a mechanical heart pump to improve the pumping ability of your heart (left ventricular assist device, LVAD).  · Heart transplant. This treatment is considered for certain people who do not improve with other treatments.  Where to find more information:  · National Heart, Lung, and [facility] [place]: www.nhlbi.nih.gov/health/health-topics/topics/hf  · Centers for Disease Control and Prevention: www.cdc.gov/dhdsp/data_statistics/fact_sheets/fs_heart_failure.htm  · [facility]: [facility].gov/heartfailure/heartfailuredefined/01.html  · American Heart Association: www.heart.org/HEARTORG/Conditions/HeartFailure/Heart-Failure_UCM_002019_SubHomePage.jsp  Contact a health care provider if:  · You have rapid  weight gain.  · You have increasing shortness of breath that is unusual for you.  · You tire easily, or you are unable to participate in your usual activities.  · You cough more than normal, especially with physical activity.  · You have any swelling or more swelling in areas such as your hands, feet, ankles, or abdomen.  Summary  · Heart failure can be prevented by making changes to your diet and your lifestyle.  · It is important to eat a healthy diet, manage your weight, exercise regularly, manage stress, avoid drugs and alcohol, and keep your cholesterol and blood pressure under control.  · Heart failure can cause very serious problems over time.  This information is not intended to replace advice given to you by your health care provider. Make sure you discuss any questions you have with your health care provider.  Document Released: 08/08/2017 Document Revised: 08/08/2017 Document Reviewed: 08/08/2017  [employer] Interactive Patient Education © 2017 [employer]

## 2019-08-24 LAB
COLLECT DURATION TIME SPEC: NORMAL HRS
CREAT 24H UR-MCNC: 102 MG/DL
CREAT 24H UR-MRATE: NORMAL MG/D (ref 1000–2500)
METANEPH 24H UR-MCNC: 86 UG/L
METANEPH 24H UR-MRATE: NORMAL UG/D (ref 62–207)
METANEPH/CREAT 24H UR: 84 UG/G CRT (ref 0–300)
NORMETANEPHRINE 24H UR-MCNC: 277 UG/L
NORMETANEPHRINE 24H UR-MRATE: NORMAL UG/D (ref 125–510)
NORMETANEPHRINE/CREAT 24H UR: 272 UG/G CRT (ref 0–400)
SPECIMEN VOL ?TM UR: NORMAL ML

## 2019-09-04 ENCOUNTER — OFFICE VISIT (OUTPATIENT)
Dept: CARDIOLOGY | Facility: MEDICAL CENTER | Age: 39
End: 2019-09-04
Payer: COMMERCIAL

## 2019-09-04 VITALS
OXYGEN SATURATION: 91 % | SYSTOLIC BLOOD PRESSURE: 116 MMHG | BODY MASS INDEX: 47.82 KG/M2 | HEART RATE: 88 BPM | HEIGHT: 65 IN | DIASTOLIC BLOOD PRESSURE: 86 MMHG | WEIGHT: 287 LBS

## 2019-09-04 DIAGNOSIS — I50.22 HYPERTENSIVE HEART DISEASE WITH CHRONIC SYSTOLIC CONGESTIVE HEART FAILURE (HCC): ICD-10-CM

## 2019-09-04 DIAGNOSIS — I51.89 LEFT VENTRICULAR SYSTOLIC DYSFUNCTION, NYHA CLASS 2: ICD-10-CM

## 2019-09-04 DIAGNOSIS — I10 HTN (HYPERTENSION), MALIGNANT: ICD-10-CM

## 2019-09-04 DIAGNOSIS — I50.20 ACC/AHA STAGE C SYSTOLIC HEART FAILURE (HCC): ICD-10-CM

## 2019-09-04 DIAGNOSIS — I11.0 HYPERTENSIVE HEART DISEASE WITH CHRONIC SYSTOLIC CONGESTIVE HEART FAILURE (HCC): ICD-10-CM

## 2019-09-04 DIAGNOSIS — Z79.899 HIGH RISK MEDICATION USE: ICD-10-CM

## 2019-09-04 PROCEDURE — 99245 OFF/OP CONSLTJ NEW/EST HI 55: CPT | Performed by: INTERNAL MEDICINE

## 2019-09-04 RX ORDER — CARVEDILOL 25 MG/1
25 TABLET ORAL 2 TIMES DAILY WITH MEALS
Qty: 180 TAB | Refills: 3 | Status: SHIPPED | OUTPATIENT
Start: 2019-09-04 | End: 2020-03-24

## 2019-09-04 ASSESSMENT — MINNESOTA LIVING WITH HEART FAILURE QUESTIONNAIRE (MLHF)
WORKING AROUND THE HOUSE OR YARD DIFFICULT: 3
MAKING YOU WORRY: 3
COSTING YOU MONEY FOR MEDICAL CARE: 5
DIFFICULTY SLEEPING WELL AT NIGHT: 5
MAKING YOU STAY IN A HOSPITAL: 5
DIFFICULTY TO CONCENTRATE OR REMEMBERING THINGS: 0
TOTAL_SCORE: 78
EATING LESS FOODS YOU LIKE: 3
DIFFICULTY WITH SEXUAL ACTIVITIES: 5
HAVING TO SIT OR LIE DOWN DURING THE DAY: 5
DIFFICULTY GOING AWAY FROM HOME: 3
DIFFICULTY SOCIALIZING WITH FAMILY OR FRIENDS: 0
DIFFICULTY WORKING TO EARN A LIVING: 5
WALKING ABOUT OR CLIMBING STAIRS DIFFICULT: 5
GIVING YOU SIDE EFFECTS FROM TREATMENTS: 4
TIRED, FATIGUED OR LOW ON ENERGY: 5
FEELING LIKE A BURDEN TO FAMILY AND FRIENDS: 4
LOSS OF SELF CONTROL IN YOUR LIFE: 3
DIFFICULTY WITH RECREATIONAL PASTIMES, SPORTS, HOBBIES: 4
SWELLING IN ANKLES OR LEGS: 5
MAKING YOU SHORT OF BREATH: 5
MAKING YOU FEEL DEPRESSED: 1

## 2019-09-04 ASSESSMENT — ENCOUNTER SYMPTOMS
COUGH: 0
ABDOMINAL PAIN: 0
DEPRESSION: 0
DIAPHORESIS: 0
DIZZINESS: 0
MEMORY LOSS: 0
PALPITATIONS: 0
DOUBLE VISION: 0
MYALGIAS: 0
SHORTNESS OF BREATH: 1
FEVER: 0
FALLS: 0
SENSORY CHANGE: 0
HEADACHES: 0
BLURRED VISION: 0
BRUISES/BLEEDS EASILY: 0

## 2019-09-04 NOTE — PROGRESS NOTES
Chief Complaint   Patient presents with   • Congestive Heart Failure       Subjective:   Janes Wills is a 39 y.o. male who presents today for cardiac care and management and heart failure program after his recent hospitalization due to heart failure exacerbation.  Patient was admitted to the hospital and diuresed.  He lost 40 pounds of water weight.  His blood pressure was extremely elevated as well.  He had a transthoracic echocardiogram which showed LV function of 40%.  I personally interpreted the images with patient in clinic today.    No prior invasive cardiac work-up or surgery.    Patient is feeling better these days. Does get winded upon walking up inclines or for distance. No symptoms at rest or with daily living activities.      Past Medical History:   Diagnosis Date   • Allergy    • Uncontrolled type 2 diabetes mellitus (HCC) 8/23/2019     History reviewed. No pertinent surgical history.  History reviewed. No pertinent family history.  Social History     Socioeconomic History   • Marital status:      Spouse name: Not on file   • Number of children: Not on file   • Years of education: Not on file   • Highest education level: Not on file   Occupational History   • Not on file   Social Needs   • Financial resource strain: Not on file   • Food insecurity:     Worry: Not on file     Inability: Not on file   • Transportation needs:     Medical: Not on file     Non-medical: Not on file   Tobacco Use   • Smoking status: Current Every Day Smoker     Types: Cigarettes   • Smokeless tobacco: Never Used   Substance and Sexual Activity   • Alcohol use: Not Currently   • Drug use: Never   • Sexual activity: Not on file   Lifestyle   • Physical activity:     Days per week: Not on file     Minutes per session: Not on file   • Stress: Not on file   Relationships   • Social connections:     Talks on phone: Not on file     Gets together: Not on file     Attends Yarsanism service: Not on file     Active member of  club or organization: Not on file     Attends meetings of clubs or organizations: Not on file     Relationship status: Not on file   • Intimate partner violence:     Fear of current or ex partner: Not on file     Emotionally abused: Not on file     Physically abused: Not on file     Forced sexual activity: Not on file   Other Topics Concern   • Not on file   Social History Narrative   • Not on file     Allergies   Allergen Reactions   • Sulfa Drugs Rash     Heat rash      Outpatient Encounter Medications as of 9/4/2019   Medication Sig Dispense Refill   • carvedilol (COREG) 25 MG Tab Take 1 Tab by mouth 2 times a day, with meals. 180 Tab 3   • metFORMIN ER (GLUCOPHAGE XR) 500 MG TABLET SR 24 HR Take 2 Tabs by mouth 2 times a day. 360 Tab 1   • atorvastatin (LIPITOR) 20 MG Tab Take 1 Tab by mouth every evening for 31 days. 30 Tab 1   • chlorthalidone (HYGROTON) 25 MG Tab Take 1 Tab by mouth every day for 31 days. 30 Tab 0   • furosemide (LASIX) 40 MG Tab Take 1 Tab by mouth every day for 31 days. 30 Tab 0   • lisinopril (PRINIVIL) 20 MG Tab Take 1 Tab by mouth every day for 30 days. 30 Tab 1   • spironolactone (ALDACTONE) 25 MG Tab Take 1 Tab by mouth every day for 30 days. 30 Tab 0   • [employer] LANCETS Misc 1 Each by Does not apply route every day. 100 Each 3   • Blood Glucose Test Strips Test strips order: Test strips for [employer] brand meter. Sig: use every morning before breakfast and prn ssx high or low sugar 100 Strip 3   • [DISCONTINUED] carvedilol (COREG) 12.5 MG Tab Take 1 Tab by mouth 2 times a day for 31 days. 60 Tab 1     No facility-administered encounter medications on file as of 9/4/2019.      Review of Systems   Constitutional: Negative for diaphoresis and fever.   HENT: Negative for nosebleeds.    Eyes: Negative for blurred vision and double vision.   Respiratory: Positive for shortness of breath. Negative for cough.    Cardiovascular: Positive for leg swelling. Negative for chest pain and  "palpitations.   Gastrointestinal: Negative for abdominal pain.   Genitourinary: Negative for dysuria and frequency.   Musculoskeletal: Negative for falls and myalgias.   Skin: Negative for rash.   Neurological: Negative for dizziness, sensory change and headaches.   Endo/Heme/Allergies: Does not bruise/bleed easily.   Psychiatric/Behavioral: Negative for depression and memory loss.        Objective:   /86 (BP Location: Left arm, Patient Position: Sitting, BP Cuff Size: Adult)   Pulse 88   Ht 1.651 m (5' 5\")   Wt (!) 130.2 kg (287 lb)   SpO2 91%   BMI 47.76 kg/m²     Physical Exam   Constitutional: He is oriented to person, place, and time. No distress.   HENT:   Head: Normocephalic and atraumatic.   Right Ear: External ear normal.   Left Ear: External ear normal.   Eyes: Right eye exhibits no discharge. Left eye exhibits no discharge.   Neck: No JVD present. No thyromegaly present.   Cardiovascular: Normal rate, regular rhythm, normal heart sounds and intact distal pulses. Exam reveals no gallop and no friction rub.   No murmur heard.  Pulmonary/Chest: Breath sounds normal. No respiratory distress.   Abdominal: Bowel sounds are normal. He exhibits no distension. There is no tenderness.   Musculoskeletal: He exhibits edema. He exhibits no tenderness.   Neurological: He is alert and oriented to person, place, and time. No cranial nerve deficit.   Skin: Skin is warm and dry. He is not diaphoretic.   Psychiatric: He has a normal mood and affect. His behavior is normal.   Nursing note and vitals reviewed.      Assessment:     1. ACC/AHA stage C systolic heart failure (HCC)     2. Left ventricular systolic dysfunction, NYHA class 2     3. HTN (hypertension), malignant     4. Hypertensive heart disease with chronic systolic congestive heart failure (HCC)     5. High risk medication use         Medical Decision Making:  Today's Assessment / Status / Plan:   Today, based on physical examination findings, patient " is euvolemic. No JVD, lungs are clear to auscultation, no pitting edema in bilateral lower extremities, no ascites.    Dry weight is 287 lbs.    At this point, I suspect that patient's etiology of cardiomyopathy is likely related to uncontrolled hypertension.  Therefore, optimal plan is to optimize blood pressure control.    We will increase carvedilol to 25 mg p.o. twice a day.  Continue lisinopril 20 mg once a day, spironolactone 25 mg once a day.  Consider stopping chlorthalidone in the future.    Continue diuresis with furosemide 40 mg p.o. once a day.    Weight loss will also be beneficial.  Patient understands.

## 2019-09-19 ENCOUNTER — OFFICE VISIT (OUTPATIENT)
Dept: MEDICAL GROUP | Facility: PHYSICIAN GROUP | Age: 39
End: 2019-09-19
Payer: COMMERCIAL

## 2019-09-19 VITALS
TEMPERATURE: 97.4 F | SYSTOLIC BLOOD PRESSURE: 98 MMHG | HEART RATE: 90 BPM | WEIGHT: 287 LBS | BODY MASS INDEX: 47.82 KG/M2 | DIASTOLIC BLOOD PRESSURE: 62 MMHG | HEIGHT: 65 IN | OXYGEN SATURATION: 94 %

## 2019-09-19 DIAGNOSIS — M54.9 MID BACK PAIN ON LEFT SIDE: ICD-10-CM

## 2019-09-19 DIAGNOSIS — I50.22 HYPERTENSIVE HEART DISEASE WITH CHRONIC SYSTOLIC CONGESTIVE HEART FAILURE (HCC): ICD-10-CM

## 2019-09-19 DIAGNOSIS — I50.20 ACC/AHA STAGE C SYSTOLIC HEART FAILURE (HCC): ICD-10-CM

## 2019-09-19 DIAGNOSIS — I51.89 LEFT VENTRICULAR SYSTOLIC DYSFUNCTION, NYHA CLASS 2: ICD-10-CM

## 2019-09-19 DIAGNOSIS — I11.0 HYPERTENSIVE HEART DISEASE WITH CHRONIC SYSTOLIC CONGESTIVE HEART FAILURE (HCC): ICD-10-CM

## 2019-09-19 DIAGNOSIS — E11.8 TYPE 2 DIABETES MELLITUS WITH COMPLICATION, WITHOUT LONG-TERM CURRENT USE OF INSULIN (HCC): ICD-10-CM

## 2019-09-19 PROCEDURE — 99214 OFFICE O/P EST MOD 30 MIN: CPT | Performed by: PHYSICIAN ASSISTANT

## 2019-09-19 RX ORDER — FUROSEMIDE 40 MG/1
40 TABLET ORAL DAILY
Qty: 90 TAB | Refills: 1 | Status: SHIPPED | OUTPATIENT
Start: 2019-09-19 | End: 2020-02-20

## 2019-09-19 RX ORDER — CHLORTHALIDONE 25 MG/1
12.5 TABLET ORAL DAILY
Qty: 15 TAB | Refills: 5 | Status: SHIPPED | OUTPATIENT
Start: 2019-09-19 | End: 2019-09-30

## 2019-09-19 RX ORDER — SPIRONOLACTONE 25 MG/1
25 TABLET ORAL DAILY
Qty: 90 TAB | Refills: 1 | Status: SHIPPED | OUTPATIENT
Start: 2019-09-19 | End: 2020-02-21

## 2019-09-20 NOTE — PROGRESS NOTES
Subjective:   John Wills is a 39 y.o. male here today for follow-up on CHF and other medical problems. Is an established patient of mine.    HPI:    Patient presents to the office today for follow-up regarding CHF, hypertension, and diabetes.    He was recently in the hospital last month for CHF exacerbation and was started on multiple new medications.  Since our last visit, he has followed up with cardiology.  His carvedilol was increased to 25 mg twice daily on 9/4. He states that since this time, he has been feeling very dizzy and lightheaded, which he states he was warned would likely happen. Due to these symptoms, he is not taking full dose of carvedilol--takes 12.5 mg in the morning and 25 mg at night. Home BP readings have been running quite low--typically 80s-90s over 50-70. In addition to carvedilol, is also on chlorthalidone 25 mg daily, furosemide 40 mg daily, spironolactone 25 mg daily, and lisinopril 20 mg daily.     He is now on metformin  mg for type 2 diabetes.  He was given instructions to gradually titrate up the dose. He is currently on 500 mg twice daily.  is having less diarrhea. Blood sugars have improved significantly-- are averaging in the 120s fasting over the last 2 weeks. Endorses making significant improvements to his diet. Has completely cut out soda and dramatically cut back on other added sugars/carbohydrates. Continues to lose weight.    Has new complaint for left-sided mid back pain. This has been ongoing since getting out of the hospital. Has very physical job. States lifts anywhere from 40-70 lb boxes onto pallets at work. He denies any radiation of pain down legs, numbness, tingling, muscle weakness.      Current medicines (including changes today)  Current Outpatient Medications   Medication Sig Dispense Refill   • chlorthalidone (HYGROTON) 25 MG Tab Take 0.5 Tabs by mouth every day. 15 Tab 5   • furosemide (LASIX) 40 MG Tab Take 1 Tab by mouth every day.  "90 Tab 1   • spironolactone (ALDACTONE) 25 MG Tab Take 1 Tab by mouth every day. 90 Tab 1   • carvedilol (COREG) 25 MG Tab Take 1 Tab by mouth 2 times a day, with meals. 180 Tab 3   • metFORMIN ER (GLUCOPHAGE XR) 500 MG TABLET SR 24 HR Take 2 Tabs by mouth 2 times a day. 360 Tab 1   • [employer] LANCETS Misc 1 Each by Does not apply route every day. 100 Each 3   • Blood Glucose Test Strips Test strips order: Test strips for [employer] brand meter. Sig: use every morning before breakfast and prn ssx high or low sugar 100 Strip 3   • atorvastatin (LIPITOR) 20 MG Tab Take 1 Tab by mouth every evening for 31 days. 30 Tab 1   • lisinopril (PRINIVIL) 20 MG Tab Take 1 Tab by mouth every day for 30 days. 30 Tab 1     No current facility-administered medications for this visit.      He  has a past medical history of Allergy and Uncontrolled type 2 diabetes mellitus (HCC) (8/23/2019). He also has no past medical history of Asthma.    ROS  Pulmonary ROS: No shortness of breath  Cardiovascular ROS: No chest pain, No edema  Neurologic ROS: Positive for dizziness       Objective:     BP (!) 98/62 (BP Location: Left arm, Patient Position: Sitting, BP Cuff Size: Large adult)   Pulse 90   Temp 36.3 °C (97.4 °F)   Ht 1.651 m (5' 5\")   Wt (!) 130.2 kg (287 lb)   SpO2 94%  Body mass index is 47.76 kg/m².     Physical Exam:  Constitutional: Alert, well-appearing, very pleasant, no distress.  Skin: No rashes in visible areas.  Eye: Pupils are equal and round, conjunctiva clear, lids normal.  ENMT: Lips without lesions, moist mucus membranes.  Respiratory: Unlabored respiratory effort, lungs clear to auscultation, no wheezes, no rhonchi.  Cardiovascular: Normal S1, S2, no murmur, no lower extremity edema.  Back: Localizes pain to left mid-back just above CVA. Non-tender to palpation.      Assessment and Plan:   The following treatment plan was discussed    1. ACC/AHA stage C systolic heart failure (HCC)  2. Left ventricular systolic " dysfunction, NYHA class 2  3. Hypertensive heart disease with chronic systolic congestive heart failure (HCC)  Established problems, well-controlled. He has been running hypotensive at home, however, with ongoing dizziness/lightheadedness. BP today in the office is 98/62. I am going to decrease his chlorthalidone to only 12.5 mg daily. Per review of recent cardiology note, may consider stopping this one altogether in the future.   - chlorthalidone (HYGROTON) 25 MG Tab; Take 0.5 Tabs by mouth every day.  Dispense: 15 Tab; Refill: 5  - furosemide (LASIX) 40 MG Tab; Take 1 Tab by mouth every day.  Dispense: 90 Tab; Refill: 1  - spironolactone (ALDACTONE) 25 MG Tab; Take 1 Tab by mouth every day.  Dispense: 90 Tab; Refill: 1    4. Type 2 diabetes mellitus with complication, without long-term current use of insulin (HCC)  Established problem, improving with metformin. Patient advised to continue metformin and dietary changes. Will re-check A1c in 2 months with another follow-up appointment at that time.   - HEMOGLOBIN A1C; Future    5. Mid back pain on left side  New problem, uncontrolled. Likely muscular in nature. No current worrisome signs/symptoms. Advise conservative management including OTC pain medication as-needed. Follow up if worsening.      Followup: Return in about 2 months (around 11/19/2019).    Yakelin Ramey P.A.-C.

## 2019-09-30 ENCOUNTER — OFFICE VISIT (OUTPATIENT)
Dept: CARDIOLOGY | Facility: MEDICAL CENTER | Age: 39
End: 2019-09-30
Payer: COMMERCIAL

## 2019-09-30 VITALS
HEART RATE: 78 BPM | OXYGEN SATURATION: 96 % | BODY MASS INDEX: 45.12 KG/M2 | DIASTOLIC BLOOD PRESSURE: 60 MMHG | HEIGHT: 65 IN | SYSTOLIC BLOOD PRESSURE: 100 MMHG | WEIGHT: 270.8 LBS

## 2019-09-30 DIAGNOSIS — E87.6 HYPOKALEMIA: ICD-10-CM

## 2019-09-30 DIAGNOSIS — G47.30 SLEEP-DISORDERED BREATHING: ICD-10-CM

## 2019-09-30 DIAGNOSIS — I10 HYPERTENSION, UNSPECIFIED TYPE: ICD-10-CM

## 2019-09-30 DIAGNOSIS — E66.01 MORBID OBESITY (HCC): ICD-10-CM

## 2019-09-30 DIAGNOSIS — Z79.899 HIGH RISK MEDICATION USE: ICD-10-CM

## 2019-09-30 DIAGNOSIS — E11.8 TYPE 2 DIABETES MELLITUS WITH COMPLICATION, WITHOUT LONG-TERM CURRENT USE OF INSULIN (HCC): ICD-10-CM

## 2019-09-30 DIAGNOSIS — I51.89 LEFT VENTRICULAR SYSTOLIC DYSFUNCTION, NYHA CLASS 2: ICD-10-CM

## 2019-09-30 DIAGNOSIS — I50.22 HYPERTENSIVE HEART DISEASE WITH CHRONIC SYSTOLIC CONGESTIVE HEART FAILURE (HCC): ICD-10-CM

## 2019-09-30 DIAGNOSIS — I11.0 HYPERTENSIVE HEART DISEASE WITH CHRONIC SYSTOLIC CONGESTIVE HEART FAILURE (HCC): ICD-10-CM

## 2019-09-30 DIAGNOSIS — R79.89 ELEVATED TROPONIN I LEVEL: ICD-10-CM

## 2019-09-30 DIAGNOSIS — I50.20 ACC/AHA STAGE C SYSTOLIC HEART FAILURE (HCC): ICD-10-CM

## 2019-09-30 PROCEDURE — 99214 OFFICE O/P EST MOD 30 MIN: CPT | Performed by: INTERNAL MEDICINE

## 2019-09-30 ASSESSMENT — MINNESOTA LIVING WITH HEART FAILURE QUESTIONNAIRE (MLHF)
MAKING YOU FEEL DEPRESSED: 3
DIFFICULTY WITH SEXUAL ACTIVITIES: 3
TOTAL_SCORE: 61
EATING LESS FOODS YOU LIKE: 5
MAKING YOU STAY IN A HOSPITAL: 3
HAVING TO SIT OR LIE DOWN DURING THE DAY: 3
DIFFICULTY WORKING TO EARN A LIVING: 3
COSTING YOU MONEY FOR MEDICAL CARE: 5
DIFFICULTY SOCIALIZING WITH FAMILY OR FRIENDS: 3
MAKING YOU SHORT OF BREATH: 3
DIFFICULTY SLEEPING WELL AT NIGHT: 4
DIFFICULTY TO CONCENTRATE OR REMEMBERING THINGS: 2
SWELLING IN ANKLES OR LEGS: 3
TIRED, FATIGUED OR LOW ON ENERGY: 3
MAKING YOU WORRY: 3
DIFFICULTY WITH RECREATIONAL PASTIMES, SPORTS, HOBBIES: 3
GIVING YOU SIDE EFFECTS FROM TREATMENTS: 1
WALKING ABOUT OR CLIMBING STAIRS DIFFICULT: 3
LOSS OF SELF CONTROL IN YOUR LIFE: 0
FEELING LIKE A BURDEN TO FAMILY AND FRIENDS: 2
WORKING AROUND THE HOUSE OR YARD DIFFICULT: 3
DIFFICULTY GOING AWAY FROM HOME: 3

## 2019-09-30 ASSESSMENT — ENCOUNTER SYMPTOMS
PND: 0
CONSTITUTIONAL NEGATIVE: 1
GASTROINTESTINAL NEGATIVE: 1
SORE THROAT: 0
FEVER: 0
RESPIRATORY NEGATIVE: 1
CARDIOVASCULAR NEGATIVE: 1
COUGH: 0
EYES NEGATIVE: 1
ORTHOPNEA: 0
SPUTUM PRODUCTION: 0
BRUISES/BLEEDS EASILY: 0
HEMOPTYSIS: 0
STRIDOR: 0
CHILLS: 0
LOSS OF CONSCIOUSNESS: 0
CLAUDICATION: 0
WEAKNESS: 0
PALPITATIONS: 0
DIZZINESS: 0
MUSCULOSKELETAL NEGATIVE: 1
WHEEZING: 0
NEUROLOGICAL NEGATIVE: 1
SHORTNESS OF BREATH: 0

## 2019-09-30 ASSESSMENT — 6 MINUTE WALK TEST (6MWT): TOTAL DISTANCE WALKED (METERS): 329

## 2019-09-30 NOTE — PROGRESS NOTES
Chief Complaint   Patient presents with   • Congestive Heart Failure       Subjective:   Janes Wills is a 39 y.o. male who presents today as a follow-up for his likely hypertensive induced heart failure.  Since he was last seen he is back to NYHA class I symptoms.  He walked 329 m of 6-minute walk test today.  Increase his carvedilol to 2 pills at night or in the morning.  His blood pressures have occasionally been running in the 70s and 80s.  He still takes chlorthalidone Spironolactone and Lasix.  He is been having dizziness lightheadedness and fatigue.    Past Medical History:   Diagnosis Date   • Allergy    • Uncontrolled type 2 diabetes mellitus (HCC) 8/23/2019     History reviewed. No pertinent surgical history.  History reviewed. No pertinent family history.  Social History     Socioeconomic History   • Marital status:      Spouse name: Not on file   • Number of children: Not on file   • Years of education: Not on file   • Highest education level: Not on file   Occupational History   • Not on file   Social Needs   • Financial resource strain: Not on file   • Food insecurity:     Worry: Not on file     Inability: Not on file   • Transportation needs:     Medical: Not on file     Non-medical: Not on file   Tobacco Use   • Smoking status: Current Every Day Smoker     Types: Cigarettes   • Smokeless tobacco: Never Used   Substance and Sexual Activity   • Alcohol use: Not Currently   • Drug use: Never   • Sexual activity: Not on file   Lifestyle   • Physical activity:     Days per week: Not on file     Minutes per session: Not on file   • Stress: Not on file   Relationships   • Social connections:     Talks on phone: Not on file     Gets together: Not on file     Attends [employer] service: Not on file     Active member of club or organization: Not on file     Attends meetings of clubs or organizations: Not on file     Relationship status: Not on file   • Intimate partner violence:     Fear of current or ex  partner: Not on file     Emotionally abused: Not on file     Physically abused: Not on file     Forced sexual activity: Not on file   Other Topics Concern   • Not on file   Social History Narrative   • Not on file     Allergies   Allergen Reactions   • Sulfa Drugs Rash     Heat rash      Outpatient Encounter Medications as of 9/30/2019   Medication Sig Dispense Refill   • furosemide (LASIX) 40 MG Tab Take 1 Tab by mouth every day. 90 Tab 1   • spironolactone (ALDACTONE) 25 MG Tab Take 1 Tab by mouth every day. 90 Tab 1   • carvedilol (COREG) 25 MG Tab Take 1 Tab by mouth 2 times a day, with meals. 180 Tab 3   • metFORMIN ER (GLUCOPHAGE XR) 500 MG TABLET SR 24 HR Take 2 Tabs by mouth 2 times a day. 360 Tab 1   • [employer] LANCETS Misc 1 Each by Does not apply route every day. 100 Each 3   • Blood Glucose Test Strips Test strips order: Test strips for [employer] brand meter. Sig: use every morning before breakfast and prn ssx high or low sugar 100 Strip 3   • [DISCONTINUED] chlorthalidone (HYGROTON) 25 MG Tab Take 0.5 Tabs by mouth every day. 15 Tab 5     No facility-administered encounter medications on file as of 9/30/2019.      Review of Systems   Constitutional: Negative.  Negative for chills, fever and malaise/fatigue.   HENT: Negative.  Negative for sore throat.    Eyes: Negative.    Respiratory: Negative.  Negative for cough, hemoptysis, sputum production, shortness of breath, wheezing and stridor.    Cardiovascular: Negative.  Negative for chest pain, palpitations, orthopnea, claudication, leg swelling and PND.   Gastrointestinal: Negative.    Genitourinary: Negative.    Musculoskeletal: Negative.    Skin: Negative.    Neurological: Negative.  Negative for dizziness, loss of consciousness and weakness.   Endo/Heme/Allergies: Negative.  Does not bruise/bleed easily.   All other systems reviewed and are negative.       Objective:   /60 (BP Location: Left arm, Patient Position: Sitting, BP Cuff Size: Adult)   " Pulse 78   Ht 1.651 m (5' 5\")   Wt 122.8 kg (270 lb 12.8 oz)   SpO2 96%   BMI 45.06 kg/m²     Physical Exam   Constitutional: He appears well-developed and well-nourished. No distress.   HENT:   Head: Normocephalic and atraumatic.   Right Ear: External ear normal.   Left Ear: External ear normal.   Nose: Nose normal.   Mouth/Throat: No oropharyngeal exudate.   Eyes: Pupils are equal, round, and reactive to light. Conjunctivae and EOM are normal. Right eye exhibits no discharge. Left eye exhibits no discharge. No scleral icterus.   Neck: Neck supple. No JVD present.   Cardiovascular: Normal rate, regular rhythm and intact distal pulses. Exam reveals no gallop and no friction rub.   No murmur heard.  Pulmonary/Chest: Effort normal. No stridor. No respiratory distress. He has no wheezes. He has no rales. He exhibits no tenderness.   Abdominal: Soft. He exhibits no distension. There is no guarding.   Musculoskeletal: Normal range of motion. He exhibits no edema, tenderness or deformity.   Neurological: He is alert. He has normal reflexes. He displays normal reflexes. No cranial nerve deficit. He exhibits normal muscle tone. Coordination normal.   Skin: Skin is warm and dry. No rash noted. He is not diaphoretic. No erythema. No pallor.   Psychiatric: He has a normal mood and affect. His behavior is normal. Judgment and thought content normal.   Nursing note and vitals reviewed.      Assessment:     1. ACC/AHA stage C systolic heart failure (HCC)  EC-ECHOCARDIOGRAM COMPLETE W/O CONT   2. Elevated troponin I level     3. Hypertension, unspecified type  EC-ECHOCARDIOGRAM COMPLETE W/O CONT   4. Hypokalemia     5. Left ventricular systolic dysfunction, NYHA class 2  EC-ECHOCARDIOGRAM COMPLETE W/O CONT   6. High risk medication use     7. Hypertensive heart disease with chronic systolic congestive heart failure (HCC)     8. Morbid obesity (HCC)     9. Sleep-disordered breathing     10. Type 2 diabetes mellitus with " complication, without long-term current use of insulin (HCC)         Medical Decision Making:  Today's Assessment / Status / Plan:     39-year-old male with heart failure with reduced ejection fraction likely from hypertensive heart disease with a contribution from obesity and obstructive sleep apnea.  He is pending a sleep study which will likely let him get.  In terms of his medications have asked him to stop his chlorthalidone.  I think he could benefit from a higher blood pressure.  He will stay on the carvedilol twice daily.  We will see him back in 1 month with an echo prior.

## 2019-11-04 ENCOUNTER — HOSPITAL ENCOUNTER (OUTPATIENT)
Dept: CARDIOLOGY | Facility: MEDICAL CENTER | Age: 39
End: 2019-11-04
Attending: INTERNAL MEDICINE
Payer: COMMERCIAL

## 2019-11-04 DIAGNOSIS — I50.20 ACC/AHA STAGE C SYSTOLIC HEART FAILURE (HCC): ICD-10-CM

## 2019-11-04 DIAGNOSIS — I51.89 LEFT VENTRICULAR SYSTOLIC DYSFUNCTION, NYHA CLASS 2: ICD-10-CM

## 2019-11-04 DIAGNOSIS — I10 HYPERTENSION, UNSPECIFIED TYPE: ICD-10-CM

## 2019-11-04 PROCEDURE — 93306 TTE W/DOPPLER COMPLETE: CPT

## 2019-11-05 LAB
LV EJECT FRACT  99904: 55
LV EJECT FRACT MOD 2C 99903: 62.86
LV EJECT FRACT MOD 4C 99902: 44
LV EJECT FRACT MOD BP 99901: 53.38

## 2019-11-05 PROCEDURE — 93306 TTE W/DOPPLER COMPLETE: CPT | Mod: 26 | Performed by: INTERNAL MEDICINE

## 2019-11-12 ENCOUNTER — HOSPITAL ENCOUNTER (OUTPATIENT)
Dept: LAB | Facility: MEDICAL CENTER | Age: 39
End: 2019-11-12
Attending: PHYSICIAN ASSISTANT
Payer: COMMERCIAL

## 2019-11-12 DIAGNOSIS — E11.8 TYPE 2 DIABETES MELLITUS WITH COMPLICATION, WITHOUT LONG-TERM CURRENT USE OF INSULIN (HCC): ICD-10-CM

## 2019-11-12 PROCEDURE — 36415 COLL VENOUS BLD VENIPUNCTURE: CPT

## 2019-11-12 PROCEDURE — 83036 HEMOGLOBIN GLYCOSYLATED A1C: CPT

## 2019-11-13 LAB
EST. AVERAGE GLUCOSE BLD GHB EST-MCNC: 160 MG/DL
HBA1C MFR BLD: 7.2 % (ref 0–5.6)

## 2019-11-21 ENCOUNTER — OFFICE VISIT (OUTPATIENT)
Dept: MEDICAL GROUP | Facility: PHYSICIAN GROUP | Age: 39
End: 2019-11-21
Payer: COMMERCIAL

## 2019-11-21 VITALS
HEART RATE: 96 BPM | OXYGEN SATURATION: 95 % | DIASTOLIC BLOOD PRESSURE: 70 MMHG | HEIGHT: 65 IN | TEMPERATURE: 99.1 F | BODY MASS INDEX: 42.49 KG/M2 | SYSTOLIC BLOOD PRESSURE: 108 MMHG | WEIGHT: 255 LBS

## 2019-11-21 DIAGNOSIS — E11.69 DYSLIPIDEMIA ASSOCIATED WITH TYPE 2 DIABETES MELLITUS (HCC): ICD-10-CM

## 2019-11-21 DIAGNOSIS — E11.69 TYPE 2 DIABETES MELLITUS WITH OTHER SPECIFIED COMPLICATION, WITHOUT LONG-TERM CURRENT USE OF INSULIN (HCC): ICD-10-CM

## 2019-11-21 DIAGNOSIS — E78.5 DYSLIPIDEMIA ASSOCIATED WITH TYPE 2 DIABETES MELLITUS (HCC): ICD-10-CM

## 2019-11-21 DIAGNOSIS — I10 HYPERTENSION, UNSPECIFIED TYPE: ICD-10-CM

## 2019-11-21 PROCEDURE — 92250 FUNDUS PHOTOGRAPHY W/I&R: CPT | Mod: TC | Performed by: PHYSICIAN ASSISTANT

## 2019-11-21 PROCEDURE — 99214 OFFICE O/P EST MOD 30 MIN: CPT | Performed by: PHYSICIAN ASSISTANT

## 2019-11-21 RX ORDER — ATORVASTATIN CALCIUM 20 MG/1
20 TABLET, FILM COATED ORAL DAILY
Qty: 90 TAB | Refills: 3 | Status: SHIPPED | OUTPATIENT
Start: 2019-11-21 | End: 2020-11-13

## 2019-11-22 NOTE — PROGRESS NOTES
Subjective:   John Wills is a 39 y.o. male here today for follow-up on DM. Is an established patient of mine.    HPI:    Patient presents to the office today for routine follow-up on his diabetes.  I last saw him back in September.  He has been on metformin since recent hospitalization in August for decompensated heart failure.  Is currently on thousand milligrams twice daily.  Recent A1c last week was 7.2, down from 9.3 3 months ago.  He has continued with low carbohydrate, low sugar eating plan and has had continued weight loss since his hospitalization.  He states that his fasting blood sugars typically run between 100-1 30.  He did have isolated high reading of 154 this morning which he attributes to high carbohydrate intake yesterday including pasta salad.    Since last appointment, he has followed up again with cardiology.  His chlorthalidone was completely discontinued given persistent hypotension at home.  Blood sugars have been running better since and he is no longer having lightheadedness and presyncope.  He remains on carvedilol 25 mg twice daily as well as Lasix 40 mg daily and spironolactone 25 mg daily.  He denies any current edema.  Is breathing much better with minimal shortness of breath on exertion.  He does continue to snore and wakes up feeling inadequately rested.  He has upcoming appointment with sleep medicine early next month.      Current medicines (including changes today)  Current Outpatient Medications   Medication Sig Dispense Refill   • atorvastatin (LIPITOR) 20 MG Tab Take 1 Tab by mouth every day. 90 Tab 3   • furosemide (LASIX) 40 MG Tab Take 1 Tab by mouth every day. 90 Tab 1   • spironolactone (ALDACTONE) 25 MG Tab Take 1 Tab by mouth every day. 90 Tab 1   • carvedilol (COREG) 25 MG Tab Take 1 Tab by mouth 2 times a day, with meals. 180 Tab 3   • metFORMIN ER (GLUCOPHAGE XR) 500 MG TABLET SR 24 HR Take 2 Tabs by mouth 2 times a day. 360 Tab 1   • [employer] LANCETS Misc 1 Each  "by Does not apply route every day. 100 Each 3   • Blood Glucose Test Strips Test strips order: Test strips for [employer] brand meter. Sig: use every morning before breakfast and prn ssx high or low sugar 100 Strip 3     No current facility-administered medications for this visit.      He  has a past medical history of Allergy and Uncontrolled type 2 diabetes mellitus (HCC) (8/23/2019). He also has no past medical history of Asthma.    ROS  As per HPI.       Objective:     /70 (BP Location: Left arm, Patient Position: Sitting, BP Cuff Size: Large adult)   Pulse 96   Temp 37.3 °C (99.1 °F)   Ht 1.651 m (5' 5\")   Wt 115.7 kg (255 lb)   SpO2 95%  Body mass index is 42.43 kg/m².     Physical Exam:  Constitutional: Alert, obese but otherwise well-appearing, very pleasant, no distress.  Skin: Warm, dry, good turgor, no rashes in visible areas.  Eye: Pupils are equal and round, conjunctiva clear, lids normal.  ENMT: Lips without lesions, moist mucus membranes.  Respiratory: Unlabored respiratory effort, lungs clear to auscultation, no wheezes, no rhonchi.  Cardiovascular: Normal S1, S2, no murmur, no lower extremity edema.      Assessment and Plan:   The following treatment plan was discussed    1. Type 2 diabetes mellitus with other specified complication, without long-term current use of insulin (HCC)  Established problem, well-controlled per current home fasting blood sugar readings.  His A1c is at 7.2.  I think that the next check will be even lower.  I am not going to make any changes to his regimen today.  Should continue metformin 1000 mg twice daily.  Continue low carbohydrate eating plan.  Regular physical activity encouraged.  We will see again in 3 months.  He did want to do his retinal eye exam today which was completed by my MA.  - POCT Retinal Eye Exam  - HEMOGLOBIN A1C; Future    2. Hypertension, unspecified type  Established problem, well-controlled and improved since last visit now that he has " stopped chlorthalidone.  Blood pressure today in the office is 108/70.  He will continue on his current medications.  We will continue to follow with cardiology for his CHF.    3. Dyslipidemia associated with type 2 diabetes mellitus (HCC)  Established problem, uncontrolled.  He was started on Lipitor during hospitalization but he stopped it when he ran out of refills.  We discussed that this is a medication he should continue taking long-term given his diabetes.  We discussed that the medication not only helps to normalize the cholesterol, but also helps to prevent heart attack and stroke.  I have reordered atorvastatin for him.  Will recheck CMP and lipids prior to next appointment.  - atorvastatin (LIPITOR) 20 MG Tab; Take 1 Tab by mouth every day.  Dispense: 90 Tab; Refill: 3  - Comp Metabolic Panel; Future  - Lipid Profile; Future    Patient declined seasonal influenza vaccine.    Followup: Return in about 3 months (around 2/21/2020).    Yakelin Ramey P.A.-C.

## 2019-11-25 ENCOUNTER — OFFICE VISIT (OUTPATIENT)
Dept: CARDIOLOGY | Facility: MEDICAL CENTER | Age: 39
End: 2019-11-25
Payer: COMMERCIAL

## 2019-11-25 VITALS
BODY MASS INDEX: 42.99 KG/M2 | WEIGHT: 258 LBS | DIASTOLIC BLOOD PRESSURE: 62 MMHG | OXYGEN SATURATION: 95 % | HEIGHT: 65 IN | HEART RATE: 103 BPM | SYSTOLIC BLOOD PRESSURE: 116 MMHG

## 2019-11-25 DIAGNOSIS — E11.8 TYPE 2 DIABETES MELLITUS WITH COMPLICATION, WITHOUT LONG-TERM CURRENT USE OF INSULIN (HCC): ICD-10-CM

## 2019-11-25 DIAGNOSIS — I50.20 ACC/AHA STAGE C SYSTOLIC HEART FAILURE (HCC): ICD-10-CM

## 2019-11-25 DIAGNOSIS — I50.22 HYPERTENSIVE HEART DISEASE WITH CHRONIC SYSTOLIC CONGESTIVE HEART FAILURE (HCC): ICD-10-CM

## 2019-11-25 DIAGNOSIS — E78.5 DYSLIPIDEMIA ASSOCIATED WITH TYPE 2 DIABETES MELLITUS (HCC): ICD-10-CM

## 2019-11-25 DIAGNOSIS — Z79.899 HIGH RISK MEDICATION USE: ICD-10-CM

## 2019-11-25 DIAGNOSIS — E11.69 DYSLIPIDEMIA ASSOCIATED WITH TYPE 2 DIABETES MELLITUS (HCC): ICD-10-CM

## 2019-11-25 DIAGNOSIS — R79.89 ELEVATED TROPONIN I LEVEL: ICD-10-CM

## 2019-11-25 DIAGNOSIS — G47.30 SLEEP-DISORDERED BREATHING: ICD-10-CM

## 2019-11-25 DIAGNOSIS — I11.0 HYPERTENSIVE HEART DISEASE WITH CHRONIC SYSTOLIC CONGESTIVE HEART FAILURE (HCC): ICD-10-CM

## 2019-11-25 DIAGNOSIS — D75.1 POLYCYTHEMIA: ICD-10-CM

## 2019-11-25 DIAGNOSIS — I10 HYPERTENSION, UNSPECIFIED TYPE: ICD-10-CM

## 2019-11-25 DIAGNOSIS — I51.89 LEFT VENTRICULAR SYSTOLIC DYSFUNCTION, NYHA CLASS 2: ICD-10-CM

## 2019-11-25 PROCEDURE — 99214 OFFICE O/P EST MOD 30 MIN: CPT | Performed by: INTERNAL MEDICINE

## 2019-11-25 ASSESSMENT — ENCOUNTER SYMPTOMS
BRUISES/BLEEDS EASILY: 0
PND: 0
EYES NEGATIVE: 1
WHEEZING: 0
GASTROINTESTINAL NEGATIVE: 1
DIZZINESS: 0
FEVER: 0
CHILLS: 0
CLAUDICATION: 0
LOSS OF CONSCIOUSNESS: 0
RESPIRATORY NEGATIVE: 1
WEAKNESS: 0
CARDIOVASCULAR NEGATIVE: 1
HEMOPTYSIS: 0
CONSTITUTIONAL NEGATIVE: 1
SPUTUM PRODUCTION: 0
MUSCULOSKELETAL NEGATIVE: 1
SHORTNESS OF BREATH: 0
COUGH: 0
NEUROLOGICAL NEGATIVE: 1
STRIDOR: 0
SORE THROAT: 0
ORTHOPNEA: 0
PALPITATIONS: 0

## 2019-11-25 NOTE — PROGRESS NOTES
Chief Complaint   Patient presents with   • Congestive Heart Failure     F/V DX: CHF       Subjective:   John Wills is a 39 y.o. male who presents today as a follow-up for his heart failure with reduced ejection fraction likely from hypertensive heart disease.  He is scheduled for sleep study next month.  His blood pressure is controlled.  His most recent echocardiogram showed normalization of his ejection fraction.  He is compliant with his medications.  He has no lower extremity edema.    Past Medical History:   Diagnosis Date   • Allergy    • Uncontrolled type 2 diabetes mellitus (HCC) 8/23/2019     No past surgical history on file.  No family history on file.  Social History     Socioeconomic History   • Marital status:      Spouse name: Not on file   • Number of children: Not on file   • Years of education: Not on file   • Highest education level: Not on file   Occupational History   • Not on file   Social Needs   • Financial resource strain: Not on file   • Food insecurity:     Worry: Not on file     Inability: Not on file   • Transportation needs:     Medical: Not on file     Non-medical: Not on file   Tobacco Use   • Smoking status: Current Every Day Smoker     Packs/day: 0.25     Types: Cigarettes   • Smokeless tobacco: Never Used   • Tobacco comment: 1 pack per week   Substance and Sexual Activity   • Alcohol use: Not Currently   • Drug use: Never   • Sexual activity: Not on file   Lifestyle   • Physical activity:     Days per week: Not on file     Minutes per session: Not on file   • Stress: Not on file   Relationships   • Social connections:     Talks on phone: Not on file     Gets together: Not on file     Attends [employer] service: Not on file     Active member of club or organization: Not on file     Attends meetings of clubs or organizations: Not on file     Relationship status: Not on file   • Intimate partner violence:     Fear of current or ex partner: Not on file     Emotionally  "abused: Not on file     Physically abused: Not on file     Forced sexual activity: Not on file   Other Topics Concern   • Not on file   Social History Narrative   • Not on file     Allergies   Allergen Reactions   • Sulfa Drugs Rash     Heat rash      Outpatient Encounter Medications as of 11/25/2019   Medication Sig Dispense Refill   • atorvastatin (LIPITOR) 20 MG Tab Take 1 Tab by mouth every day. 90 Tab 3   • furosemide (LASIX) 40 MG Tab Take 1 Tab by mouth every day. 90 Tab 1   • spironolactone (ALDACTONE) 25 MG Tab Take 1 Tab by mouth every day. 90 Tab 1   • carvedilol (COREG) 25 MG Tab Take 1 Tab by mouth 2 times a day, with meals. 180 Tab 3   • metFORMIN ER (GLUCOPHAGE XR) 500 MG TABLET SR 24 HR Take 2 Tabs by mouth 2 times a day. 360 Tab 1   • [employer] LANCETS Misc 1 Each by Does not apply route every day. 100 Each 3   • Blood Glucose Test Strips Test strips order: Test strips for [employer] brand meter. Sig: use every morning before breakfast and prn ssx high or low sugar 100 Strip 3     No facility-administered encounter medications on file as of 11/25/2019.      Review of Systems   Constitutional: Negative.  Negative for chills, fever and malaise/fatigue.   HENT: Negative.  Negative for sore throat.    Eyes: Negative.    Respiratory: Negative.  Negative for cough, hemoptysis, sputum production, shortness of breath, wheezing and stridor.    Cardiovascular: Negative.  Negative for chest pain, palpitations, orthopnea, claudication, leg swelling and PND.   Gastrointestinal: Negative.    Genitourinary: Negative.    Musculoskeletal: Negative.    Skin: Negative.    Neurological: Negative.  Negative for dizziness, loss of consciousness and weakness.   Endo/Heme/Allergies: Negative.  Does not bruise/bleed easily.   All other systems reviewed and are negative.       Objective:   /62 (BP Location: Right arm, Patient Position: Sitting, BP Cuff Size: Adult)   Pulse (!) 103   Ht 1.651 m (5' 5\")   Wt 117 kg " (258 lb)   SpO2 95%   BMI 42.93 kg/m²     Physical Exam   Constitutional: He appears well-developed and well-nourished. No distress.   HENT:   Head: Normocephalic and atraumatic.   Right Ear: External ear normal.   Left Ear: External ear normal.   Nose: Nose normal.   Mouth/Throat: No oropharyngeal exudate.   Eyes: Pupils are equal, round, and reactive to light. Conjunctivae and EOM are normal. Right eye exhibits no discharge. Left eye exhibits no discharge. No scleral icterus.   Neck: Neck supple. No JVD present.   Cardiovascular: Normal rate, regular rhythm and intact distal pulses. Exam reveals no gallop and no friction rub.   No murmur heard.  Pulmonary/Chest: Effort normal. No stridor. No respiratory distress. He has no wheezes. He has no rales. He exhibits no tenderness.   Abdominal: Soft. He exhibits no distension. There is no guarding.   Musculoskeletal: Normal range of motion.         General: No tenderness, deformity or edema.   Neurological: He is alert. He has normal reflexes. He displays normal reflexes. No cranial nerve deficit. He exhibits normal muscle tone. Coordination normal.   Skin: Skin is warm and dry. No rash noted. He is not diaphoretic. No erythema. No pallor.   Psychiatric: He has a normal mood and affect. His behavior is normal. Judgment and thought content normal.   Nursing note and vitals reviewed.      Assessment:     1. High risk medication use     2. Hypertension, unspecified type     3. Hypertensive heart disease with chronic systolic congestive heart failure (HCC)     4. Elevated troponin I level     5. Dyslipidemia associated with type 2 diabetes mellitus (HCC)     6. ACC/AHA stage C systolic heart failure (HCC)     7. Type 2 diabetes mellitus with complication, without long-term current use of insulin (HCC)     8. Sleep-disordered breathing     9. Polycythemia     10. Left ventricular systolic dysfunction, NYHA class 2         Medical Decision Making:  Today's Assessment /  Status / Plan:     39-year-old male with heart failure with reduced ejection fraction from hypertensive heart disease and obesity with sleep apnea.  We will await his sleep study.  Otherwise we will keep the same medications.  There were no changes today.  We will see him back in 3 months

## 2019-11-27 LAB — RETINAL SCREEN: POSITIVE

## 2019-11-29 ASSESSMENT — ENCOUNTER SYMPTOMS: SLEEP DISTURBANCE: 1

## 2019-12-02 ENCOUNTER — SLEEP CENTER VISIT (OUTPATIENT)
Dept: SLEEP MEDICINE | Facility: MEDICAL CENTER | Age: 39
End: 2019-12-02
Payer: COMMERCIAL

## 2019-12-02 VITALS
HEIGHT: 65 IN | BODY MASS INDEX: 42.49 KG/M2 | OXYGEN SATURATION: 92 % | HEART RATE: 100 BPM | DIASTOLIC BLOOD PRESSURE: 84 MMHG | SYSTOLIC BLOOD PRESSURE: 122 MMHG | WEIGHT: 255 LBS | RESPIRATION RATE: 15 BRPM

## 2019-12-02 DIAGNOSIS — E11.3293 TYPE 2 DIABETES MELLITUS WITH BOTH EYES AFFECTED BY MILD NONPROLIFERATIVE RETINOPATHY WITHOUT MACULAR EDEMA, WITHOUT LONG-TERM CURRENT USE OF INSULIN (HCC): ICD-10-CM

## 2019-12-02 DIAGNOSIS — I50.22 HYPERTENSIVE HEART DISEASE WITH CHRONIC SYSTOLIC CONGESTIVE HEART FAILURE (HCC): ICD-10-CM

## 2019-12-02 DIAGNOSIS — E11.8 TYPE 2 DIABETES MELLITUS WITH COMPLICATION, WITHOUT LONG-TERM CURRENT USE OF INSULIN (HCC): ICD-10-CM

## 2019-12-02 DIAGNOSIS — I11.0 HYPERTENSIVE HEART DISEASE WITH CHRONIC SYSTOLIC CONGESTIVE HEART FAILURE (HCC): ICD-10-CM

## 2019-12-02 DIAGNOSIS — G47.30 SLEEP DISORDER BREATHING: ICD-10-CM

## 2019-12-02 PROCEDURE — 99204 OFFICE O/P NEW MOD 45 MIN: CPT | Performed by: FAMILY MEDICINE

## 2019-12-02 NOTE — PROGRESS NOTES
"     [facility]  Consult Note     Date: 12/2/2019 / Time: 1:08 PM    Patient ID:   Name:             John Wills   YOB: 1980  Age:                 39 y.o.  male   MRN:               [id number]      Thank you for requesting a sleep medicine consultation on John Wills at the sleep center. He presents today with the chief complaints of snoring, gasping occasional excessive daytime sleepiness. He is referred by Yakelin Ramey for evaluation and treatment of sleep disorder breathing.     HISTORY OF PRESENT ILLNESS:       At night,  John Wills goes to bed around 10 pm on weekdays and around 11 pm on the weekends. He gets out of bed at 6 am on weekdays and at 8 am on the weekends.  He averages about 6 hrs of sleep on a good night. He falls asleep within 30-45 minutes. He awakens 1-2 times during the night due to bathroom use or no obvious reason. It takes him few min to fall back asleep and occasionally 20 min.    He is aware of snoring/breathing pauses/gasping or choking in sleep.  He  denies any symptoms of restless legs syndrome such as an \"urge to move\"  He  legs in the evening or bedtime. He  denies any symptoms of narcolepsy such as sleep paralysis or cataplexy, or any symptoms to suggest parasomnias such as sleep walking or acting out of dreams. He  has not used any medications for the sleep problem.    Overall, he doesnot finds his sleep refreshing. In terms of  excessive daytime sleepiness,  He complains of sleepiness while  at work, while reading or watching TV and occasionally while driving. [place] sleepiness scale score is 10/24 .He does take regular naps. The naps are usually 30-60 min long.      REVIEW OF SYSTEMS:       Constitutional: Denies fevers, Denies weight changes  Eyes: Denies changes in vision, no eye pain  Ears/Nose/Throat/Mouth: Denies nasal congestion or sore throat   Cardiovascular: Denies chest pain or palpitations   Respiratory: Denies shortness of " breath , Denies cough  Gastrointestinal/Hepatic: Denies abdominal pain, nausea, vomiting, diarrhea, constipation or GI bleeding   Genitourinary: Denies bladder dysfunction, dysuria or frequency  Musculoskeletal/Rheum: Denies  joint pain and swelling   Skin/Breast: Denies rash  Neurological: Denies headache, confusion, memory loss or focal weakness/parasthesias  Psychiatric: denies mood disorder     Comprehensive review of systems form is reviewed with the patient and is attached in the EMR.     PMH:  has a past medical history of Allergy, Apnea, sleep, Back pain, Chickenpox, Constipation, Cough, Daytime sleepiness, Diarrhea, Difficulty breathing, Dizziness, Fatigue, Frequent headaches, Frequent urination, Gasping for breath, GERD (gastroesophageal reflux disease), Hearing difficulty, Hypertension, Morning headache, Obesity, Painful joint, Rhinitis, Shortness of breath, Sleep apnea, Snoring, Sore muscles, Sweat, sweating, excessive, Swelling of lower extremity, Uncontrolled type 2 diabetes mellitus (HCC) (8/23/2019), Weakness, and Wheezing. He also has no past medical history of Asthma.  MEDS:   Current Outpatient Medications:   •  atorvastatin (LIPITOR) 20 MG Tab, Take 1 Tab by mouth every day., Disp: 90 Tab, Rfl: 3  •  furosemide (LASIX) 40 MG Tab, Take 1 Tab by mouth every day., Disp: 90 Tab, Rfl: 1  •  spironolactone (ALDACTONE) 25 MG Tab, Take 1 Tab by mouth every day., Disp: 90 Tab, Rfl: 1  •  carvedilol (COREG) 25 MG Tab, Take 1 Tab by mouth 2 times a day, with meals., Disp: 180 Tab, Rfl: 3  •  metFORMIN ER (GLUCOPHAGE XR) 500 MG TABLET SR 24 HR, Take 2 Tabs by mouth 2 times a day., Disp: 360 Tab, Rfl: 1  •  [place] LANCETS Misc, 1 Each by Does not apply route every day., Disp: 100 Each, Rfl: 3  •  Blood Glucose Test Strips, Test strips order: Test strips for [employer] brand meter. Sig: use every morning before breakfast and prn ssx high or low sugar, Disp: 100 Strip, Rfl: 3  ALLERGIES:   Allergies  "  Allergen Reactions   • Sulfa Drugs Rash     Heat rash      SURGHX: History reviewed. No pertinent surgical history.  SOCHX:  reports that he is a non-smoker but has been exposed to tobacco smoke. The exposure started about 23 years ago. He has been exposed to 0.00 packs per day for the past 23.00 years. He has never used smokeless tobacco. He reports that he does not drink alcohol or use drugs.   FH:   Family History   Problem Relation Age of Onset   • Hypertension Maternal Grandmother    • Sleep Apnea Father            Physical Exam:  Vitals/ General Appearance:   Weight/BMI: Body mass index is 42.43 kg/m².  /84 (BP Location: Left arm, Patient Position: Sitting, BP Cuff Size: Adult)   Pulse 100   Resp 15   Ht 1.651 m (5' 5\")   Wt 115.7 kg (255 lb)   SpO2 92%   Vitals:    12/02/19 1251   BP: 122/84   BP Location: Left arm   Patient Position: Sitting   BP Cuff Size: Adult   Pulse: 100   Resp: 15   SpO2: 92%   Weight: 115.7 kg (255 lb)   Height: 1.651 m (5' 5\")       Pt. is alert and oriented to time, place and person. Cooperative and in no apparent distress.       -Head: Atraumatic, normocephalic.   -. Ears: Normal tympanic membrane and no discharge  -. Nose: No inferior turbinate hypertophy, no septal deviation, no polyp.   -. Throat: Oropharynx appears crowded in that the palate is overhanging (Malam Isabell scale 4. Tonsils are not enlarged, uvula is large, pharynx not inflamed.   -. Neck: Supple.  -. Chest: Trachea central, no spine deformity   -. Lungs auscultation: B/L good air entry, vesicular breath sounds, no wheezing  -. Heart auscultation: 1st and 2nd heart sounds normal, regular rhythm. No appreciable murmur.  - Extremities: no clubbing, no pedal edema.  - Skin: No rash  - NEUROLOGICAL EXAMINATION: On neurological exam, the patient was alert and oriented x3. speech was clear and fluent without dysarthria.      INVESTIGATIONS:       ASSESSMENT AND PLAN     1. He  has symptoms of Obstructive " "Sleep Apnea (KATHY). He  has excessive daytime sleepiness that  interferes with activites of daily living. He  risk factors for KATHY include obesity, thick neck, and crowded oropharynx.     The pathophysiology of KATHY and the increased risk of cardiovascular morbidity from untreated KATHY is discussed in detail with the patient. He  also has HTN and DM which can be worsened by her KATHY.     We have discussed diagnostic options including in-laboratory, attended polysomnography and home sleep testing. We have also discussed treatment options including airway pressurization, reconstructive otolaryngologic surgery, dental appliances and weight management.       Subsequently,treatment options will be discussed based on the diagnostic study. Meanwhile, He is urged to avoid supine sleep, weight gain and alcoholic beverages since all of these can worsen KATHY. He is cautioned against drowsy driving. If He feels sleepy while driving, He must pull over for a break/nap, rather than persist on the road, in the interest of He own safety and that of others on the road.    Plan  -  He  will be scheduled for an overnight PSG to assess sleep related  breathing disorder.    2. DM: last A1c was 7.2 on 11/12/19 . He is currently stable on metformin and denies complications.    3. HTN and CHF: The last ECHO was on 11/5/19 which showed EF of 55% and Moderate concentric left ventricular hypertrophy. He is currently on lasix, spironolactone and coreg. It is managed by PCP.     Regarding treatment of other past medical problems and general health maintenance,  He is urged to follow up with PCP.      Answers for HPI/ROS submitted by the patient on 11/29/2019   Year of your last physical exam: N/A  Results of exam: N/A  Occupation :   Height: 5'5\"  Current weight: 255  6 months ago: 322  At age 20: 200  What is the reason for your visit today?: Sleep Apnea  Name of person referring you to the Sleep Center: Yakelin Ramey  Have you " ever been hospitalized?: Yes  Reason, year, and hospital in which you were hospitalized:: Hypertension and high blood pressure,2019, [facility]  Have you ever had problems with anesthesia?: No  Have you experienced post-operative delirium?: Yes  Any complications with surgery?: No  What year did you receive your last Flu shot?: 2011  What year did you receive you last Pneumonia shot?: N/A  Have you had a TB skin test? If so, please list the year and result:: N/A  Have you had Allergy skin testing? If so, please list the year and result:: No  Please briefly describe your sleep problem and how old you were when it began.: Snoring and gasping for air, 25  How does this affect your daily life and activities? Please also rate how serious of a problem this is (1 = Not at all, 10 = Very Serious).: 8  Have you had any previous evaluations, examinations, or treatment for this sleep problem or any other problems with your sleep? If so, please describe the evaluation, treatment, and results.: No  Have you used any medications (prescribed or otherwise) to help your sleep problem? If yes, include name, amount, frequency, and the prescribing physician.: No  If employed, what time do you usually start and end work?: 8-4:30  Do you ever change work shifts? If yes, describe how often (never, infrequently, regularly).: Yes, infrequent  What time do you usually go to bed and wake up on: Weekdays? Weekends?: Wake at 6am, bed at 10pm, weekdays. Wake at 8-9am, bed at 10 or 11pm on weekends.  Do you have a regular bed partner?: Yes  How many minutes does it usually take to fall asleep at night after turning off the lights?: 30 minutes  What do you ordinarily do just prior to turning out the lights and attempting to go to sleep (e.g., reading, TV, baths, etc.)?: Tv  On average, how many times do you wake up during the night?: 3-4  On average, how many times do you wake up to use the bathroom?: 2-3  Do you often wake up too early in the morning  and are unable to return to sleep?: Yes  On average, how many hours of sleep do you get per night?: 6  How do you usually awaken?  Alarm, spontaneously, or other?: Alarm  Is it difficult for you to awaken and get out of bed after sleeping? (Not at all, Sometimes, Very): Very  Do you nap or return to bed after arising?: On weekends  Are you bothered by sleepiness during the day?: Yes  Do you feel that you get too much sleep at night?: Sometimes  Do you feel that you get too little sleep at night?: Always  Do you usually feel tired during the day? If so, what do you attribute this to?: Yes not sleeping well  Do you find yourself falling asleep when you don't mean to? : Yes  If yes, how long does your sleep episode last?: 5min  Do you feel rested or refreshed after the sleep episode?: No  Have you ever suddenly fallen?: No  Have you ever experienced sudden body weakness?: Yes  If yes, were you aware of the things around you?: Yes  Was the weakness brought on by any particular event or feeling? If so, briefly describe.: Just over exerted activity  Have you ever experienced weakness or paralysis upon going to sleep?: No  Have you ever experienced weakness or paralysis upon awakening from sleep?: No  Have you ever experienced seeing things or hearing voices/noises: That weren't real? On going to sleep? During the night? On awakening from sleep? During the day?: No  Do you have difficulty breathing at night? If yes, briefly describe.: Yes, snoring  How many times per week?: Everyday  How did you become aware of this, at what age did this first occur, and how many years has this occurred?: Somebody told me. 14 years  Have you been told you snore while asleep? If so, does it disturb a bed partner (or someone in the same room), or someone in the next room?: Yes it disturbs everyone  Have you ever experienced doing something without being aware of the action? If yes, please describe.: No  How many times per week does this  "occur?: None  Have you ever experienced upon lying in bed before sleep or on awakening from sleep: Restlessness of legs, \"nervous legs\", \"creeping crawling\" sensation of legs, or twitching of legs?: Yes  How many times per week does this occur, and how many minutes does the sensation last?: 1-2 times a week  Does anything relieve the sensations (e.g., getting out of bed, medication, massage)?: No  At what age did this first occur, and how many years has this occurred?: 35, 4  Have you ever been told that your arms or legs jerk or twitch while you are asleep? If yes, how many times per night does this occur?: Yes  At what age did this first occur, and how many years has this occurred?: 37, 2  Does this seem to awaken you from your sleep?: Yes  Do you know, or have you ever been told that you do any of the following while sleeping: talk, walk, grit teeth, wet the bed, wake up screaming or seemingly afraid, have disturbing dreams, have unusual movements, wake up with headaches, (males) have erections? If yes to any of these, please indicate how many times per week, age started, last occurrence, treatment received.: No  Has anyone in your family been known to have any sleep problems? If yes, please list the type of problem (e.g., trouble getting to sleep, too sleepy, bed wetting, etc.), the relationship of this person to you, and the treatment received.: Yes, sleep apnea    "

## 2019-12-02 NOTE — PATIENT INSTRUCTIONS
Sleep Apnea  Sleep apnea is a condition in which breathing pauses or becomes shallow during sleep. Episodes of sleep apnea usually last 10 seconds or longer, and they may occur as many as 20 times an hour. Sleep apnea disrupts your sleep and keeps your body from getting the rest that it needs. This condition can increase your risk of certain health problems, including:  · Heart attack.  · Stroke.  · Obesity.  · Diabetes.  · Heart failure.  · Irregular heartbeat.  There are three kinds of sleep apnea:  · Obstructive sleep apnea. This kind is caused by a blocked or collapsed airway.  · Central sleep apnea. This kind happens when the part of the brain that controls breathing does not send the correct signals to the muscles that control breathing.  · Mixed sleep apnea. This is a combination of obstructive and central sleep apnea.  What are the causes?  The most common cause of this condition is a collapsed or blocked airway. An airway can collapse or become blocked if:  · Your throat muscles are abnormally relaxed.  · Your tongue and tonsils are larger than normal.  · You are overweight.  · Your airway is smaller than normal.  What increases the risk?  This condition is more likely to develop in people who:  · Are overweight.  · Smoke.  · Have a smaller than normal airway.  · Are elderly.  · Are male.  · Drink alcohol.  · Take sedatives or tranquilizers.  · Have a family history of sleep apnea.  What are the signs or symptoms?  Symptoms of this condition include:  · Trouble staying asleep.  · Daytime sleepiness and tiredness.  · Irritability.  · Loud snoring.  · Morning headaches.  · Trouble concentrating.  · Forgetfulness.  · Decreased interest in sex.  · Unexplained sleepiness.  · Mood swings.  · Personality changes.  · Feelings of depression.  · Waking up often during the night to urinate.  · Dry mouth.  · Sore throat.  How is this diagnosed?  This condition may be diagnosed with:  · A medical history.  · A  physical exam.  · A series of tests that are done while you are sleeping (sleep study). These tests are usually done in a sleep lab, but they may also be done at home.  How is this treated?  Treatment for this condition aims to restore normal breathing and to ease symptoms during sleep. It may involve managing health issues that can affect breathing, such as high blood pressure or obesity. Treatment may include:  · Sleeping on your side.  · Using a decongestant if you have nasal congestion.  · Avoiding the use of depressants, including alcohol, sedatives, and narcotics.  · Losing weight if you are overweight.  · Making changes to your diet.  · Quitting smoking.  · Using a device to open your airway while you sleep, such as:  ¨ An oral appliance. This is a custom-made mouthpiece that shifts your lower jaw forward.  ¨ A continuous positive airway pressure (CPAP) device. This device delivers oxygen to your airway through a mask.  ¨ A nasal expiratory positive airway pressure (EPAP) device. This device has valves that you put into each nostril.  ¨ A bi-level positive airway pressure (BPAP) device. This device delivers oxygen to your airway through a mask.  · Surgery if other treatments do not work. During surgery, excess tissue is removed to create a wider airway.  It is important to get treatment for sleep apnea. Without treatment, this condition can lead to:  · High blood pressure.  · Coronary artery disease.  · (Men) An inability to achieve or maintain an erection (impotence).  · Reduced thinking abilities.  Follow these instructions at home:  · Make any lifestyle changes that your health care provider recommends.  · Eat a healthy, well-balanced diet.  · Take over-the-counter and prescription medicines only as told by your health care provider.  · Avoid using depressants, including alcohol, sedatives, and narcotics.  · Take steps to lose weight if you are overweight.  · If you were given a device to open your airway  while you sleep, use it only as told by your health care provider.  · Do not use any tobacco products, such as cigarettes, chewing tobacco, and e-cigarettes. If you need help quitting, ask your health care provider.  · Keep all follow-up visits as told by your health care provider. This is important.  Contact a health care provider if:  · The device that you received to open your airway during sleep is uncomfortable or does not seem to be working.  · Your symptoms do not improve.  · Your symptoms get worse.  Get help right away if:  · You develop chest pain.  · You develop shortness of breath.  · You develop discomfort in your back, arms, or stomach.  · You have trouble speaking.  · You have weakness on one side of your body.  · You have drooping in your face.  These symptoms may represent a serious problem that is an emergency. Do not wait to see if the symptoms will go away. Get medical help right away. Call your local emergency services (911 in the [place] Do not drive yourself to the hospital.   This information is not intended to replace advice given to you by your health care provider. Make sure you discuss any questions you have with your health care provider.  Document Released: 12/08/2003 Document Revised: 08/13/2017 Document Reviewed: 09/26/2016  Elsevier Interactive Patient Education © 2017 [employer]

## 2020-01-07 ENCOUNTER — SLEEP STUDY (OUTPATIENT)
Dept: SLEEP MEDICINE | Facility: MEDICAL CENTER | Age: 40
End: 2020-01-07
Attending: FAMILY MEDICINE
Payer: COMMERCIAL

## 2020-01-07 DIAGNOSIS — I50.22 HYPERTENSIVE HEART DISEASE WITH CHRONIC SYSTOLIC CONGESTIVE HEART FAILURE (HCC): ICD-10-CM

## 2020-01-07 DIAGNOSIS — G47.30 SLEEP DISORDER BREATHING: ICD-10-CM

## 2020-01-07 DIAGNOSIS — I11.0 HYPERTENSIVE HEART DISEASE WITH CHRONIC SYSTOLIC CONGESTIVE HEART FAILURE (HCC): ICD-10-CM

## 2020-01-07 DIAGNOSIS — E11.8 TYPE 2 DIABETES MELLITUS WITH COMPLICATION, WITHOUT LONG-TERM CURRENT USE OF INSULIN (HCC): ICD-10-CM

## 2020-01-07 PROCEDURE — 95811 POLYSOM 6/>YRS CPAP 4/> PARM: CPT | Performed by: INTERNAL MEDICINE

## 2020-01-08 NOTE — PROCEDURES
The patient underwent a split night polysomnogram with a CPAP titration using the standard montage for measurement of paramaters of sleep, respiratory events, movement abnormalities, heart rate and rhythm.  A Microphone was used to monitior snoring.  Interpretation:  Study start time was 10:43:56 PM.  Total recording time was 2h 21.5m (141 minutes) with a total sleep time of 2h 0.5m (120 minutes) resulting in a sleep efficiency of 85.16%.  Sleep latency from the start fo the study was 12 minutes minutes and REM latency from sleep onset was 96 minutes minutes.  Respiratory:   There were 146 apneas in total consisting of 145 obstructive apneas, 1 mixed apneas, and 0 central apneas.  There were 131 hypopneas in total.  The apnea index was 72.70 per hour and the hypopnea index was 65.23 per hour.  The overall AHI was 137.9, with a REM AHI of 106.67, and a supine AHI of 137.93.  Limb Movements:  There were a total of 0 periodic leg movements, of which 0 were PLMS arousals.  This resulted in a PLMS index of 0.0 and a PLMS arousal index of 0.0  Oximetry:  The mean SaO2 was 87.0% for the diagnostic portion of the study, with a minimum SaO2 of 63.0%.    Treatment:  Interpretation:  Treatment recording time was 3h 55.5m (235 minutes) with a total sleep time of 3h 22.5m (202 minutes) resulting in a sleep efficiency of 86.0%.    Sleep latency from the start of treatment was 10 minutes minutes and REM latency from sleep onset was 1h 0.5m minutes.    The patient had 31 arousals in total for an arousal index of 9.2.  Respiratory:   There were 10 apneas in total consisting of  9 obstructive apneas, 1 central apneas, and 0 mixed apneas for an apnea index of 2.96.    The patient had 137 hypopneas in total, which resulted in a hypopnea index of 40.59.    The overall AHI was 43.56, with a REM AHI of 57.74, and a supine AHI of 43.56.     Limb Movements:  There were a total of 0 periodic leg movements, of which 0 were PLMS arousals.   This resulted in a PLMS index of 0.0 and a PLMS arousal index of 0.0.  Oximetry:  The mean SaO2 during treatment was 90.0%, with a minimum oxygen saturation of 69.0%.    CPAP was tried at 6cm H2O and titrated as high as 15cm H2O. Bilevel was tried at 18/14cm H2O.    On the baseline component of the sleep study sleep efficiency was 85%.  Sleep architecture showed reduced stage III and REM sleep.  Sleep latency was normal at 12 minutes.  No periodic limb movements were observed.  No EKG abnormalities appreciated    Once asleep loud snoring was heard associated with obstructive apneas and hypopneas.  The overall AHI was 137/h and associated with desaturations to a layton of 63% and REM sleep.  He spent approximately 85% of the time below SPO2 of 90%.    After meeting split-night criteria CPAP therapy was started at 6 cm of water and titrated to 15 cm of water followed by a trial on BiPAP: 18/14 cm of water with persistent events noted at maximal CPAP pressures.    Interpretation:  Very severe KATHY, /h associated with desaturations to 63% SPO2.  Incomplete CPAP titration.    Recommendations:  Given the severity of sleep apnea, CPAP/BiPAP therapy is indicated for treatment.    A designated in laboratory CPAP/BiPAP titration is advised for accurate calibration of pressure settings.

## 2020-01-15 ENCOUNTER — PATIENT MESSAGE (OUTPATIENT)
Dept: SLEEP MEDICINE | Facility: MEDICAL CENTER | Age: 40
End: 2020-01-15

## 2020-01-15 DIAGNOSIS — G47.33 OSA (OBSTRUCTIVE SLEEP APNEA): ICD-10-CM

## 2020-01-20 ENCOUNTER — SLEEP STUDY (OUTPATIENT)
Dept: SLEEP MEDICINE | Facility: MEDICAL CENTER | Age: 40
End: 2020-01-20
Attending: FAMILY MEDICINE
Payer: COMMERCIAL

## 2020-01-20 DIAGNOSIS — G47.33 OSA (OBSTRUCTIVE SLEEP APNEA): ICD-10-CM

## 2020-01-20 PROCEDURE — 95811 POLYSOM 6/>YRS CPAP 4/> PARM: CPT | Performed by: FAMILY MEDICINE

## 2020-01-21 NOTE — PROCEDURES
Comments:  The patient underwent a CPAP titration using the standard montage for measurement of parameters of sleep, respiratory events, movement abnormalities, and heart rate and rhythm.    A microphone was used to monitor snoring.    Interpretation:  Study start time was 10:12:49 PM.  Diagnostic recording time was 7h 51.5m with a total sleep time of 6h 13.0m resulting in a sleep efficiency of 79.11%%.  Sleep latency from the start of the study was 15 minutes and the latency from sleep to REM was 59 minutes.In total,60  arousals were scored for an arousal index of 9.7. sleep stages showed decreased SE, increased WASO of 82 min, normal n3 and REM rebound.  31% of the apneas were central apneas.    Respiratory:  There were a total of 40 apneas consisting of 2 obstructive apneas, 0 mixed apneas, and 38 central apneas.  A total of 80 hypopneas were scored.The apnea index was 6.43 per hour and the hypopnea index was 12.87 per hour resulting in an overall AHI of 19.30.AHI during rem was 29.6 and AHI while supine was 19.33.    Oximetry:  There was a mean oxygen saturation of 94.0% with a minimum oxygen saturation of 83.0%.  Time spent with oxygen saturations below 89% was 6.7 minutes.    Cardiac:  The highest heart rate seen while awake was 88 BPM while the highest heart rate during sleep was 84 BPM with an average sleeping heart rate of 70 BPM.    Limb Movements:  There were a total of 11 PLMs during sleep, of which 0 were PLMS arousals.  This resulted in a PLMS index of 1.8 and a PLMS arousal index of 0.0.     CPAP was tried from 5 to 17cm H2O. BiPAP was tried form 19/15 to 22/18cm H2O.       CPAP Titration:  The PAP titration was initiated with CPAP 5 cm of water and the pressure which was slowly titrated up in an attempt to eliminate sleep disordered breathing and snoring. CPAP was increased to 17  Cm before switching to BiPAP. The BiPAP was titrated between 19/15 cm to 22/18 cm. The lowest best tolerated pressure was  CPAP 9 cm.At this f pressure the patient was observed in short supine REM sleep stage. The apnea hypopnea index improved to 5.2 per hour and O2 layton 84%. The average O2 stauration was 93%. He spent 4 minof sleep time below 89% O2 saturation. Snoring was resolved.  The patient utilized dreamwear FFM with heated humidification. The CPAP was well-tolerated and there were minimal air leaks. No supplemental oxygen was required.    Impression:  1.  Obstructive sleep apnea  2. Treatment emergent central apnea    Recommendations:  I recommend CPAP 9 cm with dreamwear FFM. Recommended 30 day compliance download to assess the efficacy of the recommended pressure and compliance for further outpatient monitoring and management of CPAP therapy. In some cases alternative treatment options may prove effective in resolving sleep apnea and these options include upper airway surgery, the use of a dental orthotic or weight loss and positional therapy. Clinical correlation is required. In general patients with sleep apnea are advised to avoid alcohol and sedatives and to not operate a motor vehicle while drowsy and are at a greater risk for cardiovascular disease.

## 2020-01-27 NOTE — PROGRESS NOTES
Sleep study results    Last OV 12/22/19       Interpretation:  Very severe KATHY, /h associated with desaturations to 63% SPO2.  Incomplete CPAP titration.       Impression:  1.  Obstructive sleep apnea  2. Treatment emergent central apnea     Recommendations:  I recommend CPAP 9 cm with dreamwear FFM.     The lowest best tolerated pressure was CPAP 9 cm.At this f pressure the patient was observed in short supine REM sleep stage. The apnea hypopnea index improved to 5.2 per hour and O2 layton 84%. The average O2 stauration was 93%. He spent 4 minof sleep time below 89% O2 saturation. Snoring was resolved.  The patient utilized dreamwear FFM with heated humidification. The CPAP was well-tolerated and there were minimal air leaks. No supplemental oxygen was required.

## 2020-01-29 ENCOUNTER — OFFICE VISIT (OUTPATIENT)
Dept: PULMONOLOGY | Facility: HOSPICE | Age: 40
End: 2020-01-29
Payer: COMMERCIAL

## 2020-01-29 VITALS
BODY MASS INDEX: 41.19 KG/M2 | SYSTOLIC BLOOD PRESSURE: 126 MMHG | DIASTOLIC BLOOD PRESSURE: 98 MMHG | HEART RATE: 81 BPM | HEIGHT: 65 IN | OXYGEN SATURATION: 97 % | WEIGHT: 247.25 LBS

## 2020-01-29 DIAGNOSIS — I10 HYPERTENSION, UNSPECIFIED TYPE: ICD-10-CM

## 2020-01-29 DIAGNOSIS — Z78.9 NONSMOKER: ICD-10-CM

## 2020-01-29 DIAGNOSIS — G47.33 OBSTRUCTIVE SLEEP APNEA: ICD-10-CM

## 2020-01-29 PROBLEM — G47.30 SLEEP-DISORDERED BREATHING: Status: RESOLVED | Noted: 2019-08-23 | Resolved: 2020-01-29

## 2020-01-29 PROCEDURE — 99214 OFFICE O/P EST MOD 30 MIN: CPT | Performed by: NURSE PRACTITIONER

## 2020-01-29 NOTE — PROGRESS NOTES
Chief Complaint   Patient presents with   • Follow-Up     KATHY. Last seen 12/02/19   • Results     SS 01/20/2020       HPI:  John Wills is a 39 y.o. year old male here today for follow-up on sleep study results.    Last OV 12/22/19    Hx of hypertension and DMII. BMI 41.    PSG split night 1/7/2020 indicated:  Very severe KATHY, /h associated with desaturations to 63% SPO2.  Incomplete CPAP titration.    CPAP/BIPAP Titration study 1/20/2020 indicated:  Impression:  1.  Obstructive sleep apnea  2. Treatment emergent central apnea     Recommendations:  I recommend CPAP 9 cm with dreamwear FFM.     The lowest best tolerated pressure was CPAP 9 cm.At this f pressure the patient was observed in short supine REM sleep stage. The apnea hypopnea index improved to 5.2 per hour and O2 layton 84%. The average O2 stauration was 93%. He spent 4 minof sleep time below 89% O2 saturation. Snoring was resolved.  The patient utilized dreamwear FFM with heated humidification. The CPAP was well-tolerated and there were minimal air leaks. No supplemental oxygen was required.     I reviewed results with patient.  He denies cardiac or respiratory symptoms today.  He denies chronic sinus congestion.  He has lost weight and motivated to lose more.            ROS: As per HPI and otherwise negative if not stated.    Past Medical History:   Diagnosis Date   • Allergy    • Apnea, sleep    • Back pain    • Chickenpox    • Constipation    • Cough    • Daytime sleepiness    • Diarrhea    • Difficulty breathing    • Dizziness    • Fatigue    • Frequent headaches    • Frequent urination    • Gasping for breath    • GERD (gastroesophageal reflux disease)    • Hearing difficulty    • Hypertension    • Morning headache    • Obesity    • Painful joint    • Rhinitis    • Shortness of breath    • Sleep apnea    • Snoring    • Sore muscles    • Sweat, sweating, excessive    • Swelling of lower extremity    • Uncontrolled type 2 diabetes mellitus  "([facility]) 8/23/2019   • Weakness    • Wheezing        History reviewed. No pertinent surgical history.    Family History   Problem Relation Age of Onset   • Hypertension Maternal Grandmother    • Sleep Apnea Father        Social History     Socioeconomic History   • Marital status:      Spouse name: Not on file   • Number of children: Not on file   • Years of education: Not on file   • Highest education level: Not on file   Occupational History   • Not on file   Social Needs   • Financial resource strain: Not on file   • Food insecurity:     Worry: Not on file     Inability: Not on file   • Transportation needs:     Medical: Not on file     Non-medical: Not on file   Tobacco Use   • Smoking status: Passive Smoke Exposure - Never Smoker   • Smokeless tobacco: Never Used   • Tobacco comment: Quit full packs 10 years ago. Smokes 1 cigarette once a month or so.   Substance and Sexual Activity   • Alcohol use: Never   • Drug use: Never   • Sexual activity: Not on file   Lifestyle   • Physical activity:     Days per week: Not on file     Minutes per session: Not on file   • Stress: Not on file   Relationships   • Social connections:     Talks on phone: Not on file     Gets together: Not on file     Attends [employer] service: Not on file     Active member of club or organization: Not on file     Attends meetings of clubs or organizations: Not on file     Relationship status: Not on file   • Intimate partner violence:     Fear of current or ex partner: Not on file     Emotionally abused: Not on file     Physically abused: Not on file     Forced sexual activity: Not on file   Other Topics Concern   • Not on file   Social History Narrative   • Not on file       Allergies as of 01/29/2020 - Reviewed 01/29/2020   Allergen Reaction Noted   • Sulfa drugs Rash 07/17/2015        Vitals:  /98 (BP Location: Left arm, Patient Position: Sitting, BP Cuff Size: Adult)   Pulse 81   Ht 1.651 m (5' 5\")   Wt 112.2 kg (247 lb 4 " oz)   SpO2 97%     Current medications as of today   Current Outpatient Medications   Medication Sig Dispense Refill   • atorvastatin (LIPITOR) 20 MG Tab Take 1 Tab by mouth every day. 90 Tab 3   • furosemide (LASIX) 40 MG Tab Take 1 Tab by mouth every day. 90 Tab 1   • spironolactone (ALDACTONE) 25 MG Tab Take 1 Tab by mouth every day. 90 Tab 1   • carvedilol (COREG) 25 MG Tab Take 1 Tab by mouth 2 times a day, with meals. 180 Tab 3   • metFORMIN ER (GLUCOPHAGE XR) 500 MG TABLET SR 24 HR Take 2 Tabs by mouth 2 times a day. 360 Tab 1   • [employer] LANCETS Misc 1 Each by Does not apply route every day. 100 Each 3   • Blood Glucose Test Strips Test strips order: Test strips for [employer] brand meter. Sig: use every morning before breakfast and prn ssx high or low sugar 100 Strip 3     No current facility-administered medications for this visit.          Physical Exam:   Gen:           Alert and oriented, No apparent distress. Mood and affect appropriate, normal interaction with examiner.  Eyes:          PERRL, EOM intact, sclere white, conjunctive moist.  Ears:          Not examined.   Hearing:     Grossly intact.  Nose:          Normal, no lesions or deformities.  Dentition:    Good dentition.  Oropharynx:   Tongue normal, posterior pharynx without erythema or exudate.  Mallampati Classification: 3/4, large tonsils  Neck:        Supple, trachea midline, no masses.  Respiratory Effort: No intercostal retractions or use of accessory muscles.   Lung Auscultation:      Clear to auscultation bilaterally; no rales, rhonchi or wheezing.  CV:            Regular rate and rhythm. No murmurs, rubs or gallops.  Abd:           Not examined.   Lymphadenopathy: Not examined.  Gait and Station: Normal.  Digits and Nails: No clubbing, cyanosis, petechiae, or nodes.   Cranial Nerves: II-XII grossly intact.  Skin:        No rashes, lesions or ulcers noted.               Ext:           No cyanosis or edema.      Assessment:  1.  Obstructive sleep apnea  DME CPAP   2. Hypertension, unspecified type     3. Nonsmoker     4. BMI 40.0-44.9, adult ([facility])  Height And Weight       Immunizations:    Flu:recommended  Pneumovax 23:not due  Prevnar 13:not due    Plan:  1.  We will initiate sleep apnea therapy.  Patient has very severe sleep apnea will benefit from therapy.  DME auto CPAP 9 to 15 cm nightly.  Patient understands they may have mask fits within first 30 days of therapy covered by insurance to obtain best fit.  Patient understands the need to use device every night for >4hrs to meet compliance standards for insurance purposes.  2. Sleep hygiene discussed.  Recommend keeping a set sleep/wake schedule.  Long enough hours of sleep.  Limiting/avoiding naps.  No caffeine afternoon and no heavy meals in the evening.  Recommend daily exercise.  3.  Encouraged further weight loss.  4.  Follow-up in 2 to 3 months for first compliance check, sooner if needed.  Recommend follow-up oximetry once acclimated to therapy.    Please note that this dictation was created using voice recognition software. I have made every reasonable attempt to correct obvious errors, but it is possible there are errors of grammar and possibly content that I did not discover before finalizing the note.

## 2020-01-29 NOTE — PATIENT INSTRUCTIONS
Patient understands they may have mask fits within first 30 days of therapy covered by insurance to obtain best fit.    Patient understands the need to use device every night for >4hrs to meet compliance standards for insurance purposes.

## 2020-02-12 ENCOUNTER — HOSPITAL ENCOUNTER (OUTPATIENT)
Dept: LAB | Facility: MEDICAL CENTER | Age: 40
End: 2020-02-12
Attending: PHYSICIAN ASSISTANT
Payer: COMMERCIAL

## 2020-02-12 DIAGNOSIS — E78.5 DYSLIPIDEMIA ASSOCIATED WITH TYPE 2 DIABETES MELLITUS (HCC): ICD-10-CM

## 2020-02-12 DIAGNOSIS — E11.69 DYSLIPIDEMIA ASSOCIATED WITH TYPE 2 DIABETES MELLITUS (HCC): ICD-10-CM

## 2020-02-12 DIAGNOSIS — E11.69 TYPE 2 DIABETES MELLITUS WITH OTHER SPECIFIED COMPLICATION, WITHOUT LONG-TERM CURRENT USE OF INSULIN (HCC): ICD-10-CM

## 2020-02-12 LAB
ALBUMIN SERPL BCP-MCNC: 4.6 G/DL (ref 3.2–4.9)
ALBUMIN/GLOB SERPL: 1.3 G/DL
ALP SERPL-CCNC: 58 U/L (ref 30–99)
ALT SERPL-CCNC: 21 U/L (ref 2–50)
ANION GAP SERPL CALC-SCNC: 8 MMOL/L (ref 0–11.9)
AST SERPL-CCNC: 20 U/L (ref 12–45)
BILIRUB SERPL-MCNC: 0.5 MG/DL (ref 0.1–1.5)
BUN SERPL-MCNC: 34 MG/DL (ref 8–22)
CALCIUM SERPL-MCNC: 9.9 MG/DL (ref 8.5–10.5)
CHLORIDE SERPL-SCNC: 102 MMOL/L (ref 96–112)
CHOLEST SERPL-MCNC: 116 MG/DL (ref 100–199)
CO2 SERPL-SCNC: 26 MMOL/L (ref 20–33)
CREAT SERPL-MCNC: 1.82 MG/DL (ref 0.5–1.4)
EST. AVERAGE GLUCOSE BLD GHB EST-MCNC: 105 MG/DL
FASTING STATUS PATIENT QL REPORTED: NORMAL
GLOBULIN SER CALC-MCNC: 3.5 G/DL (ref 1.9–3.5)
GLUCOSE SERPL-MCNC: 94 MG/DL (ref 65–99)
HBA1C MFR BLD: 5.3 % (ref 0–5.6)
HDLC SERPL-MCNC: 30 MG/DL
LDLC SERPL CALC-MCNC: 47 MG/DL
POTASSIUM SERPL-SCNC: 4 MMOL/L (ref 3.6–5.5)
PROT SERPL-MCNC: 8.1 G/DL (ref 6–8.2)
SODIUM SERPL-SCNC: 136 MMOL/L (ref 135–145)
TRIGL SERPL-MCNC: 193 MG/DL (ref 0–149)

## 2020-02-12 PROCEDURE — 83036 HEMOGLOBIN GLYCOSYLATED A1C: CPT

## 2020-02-12 PROCEDURE — 36415 COLL VENOUS BLD VENIPUNCTURE: CPT

## 2020-02-12 PROCEDURE — 80061 LIPID PANEL: CPT

## 2020-02-12 PROCEDURE — 80053 COMPREHEN METABOLIC PANEL: CPT

## 2020-02-20 ENCOUNTER — OFFICE VISIT (OUTPATIENT)
Dept: MEDICAL GROUP | Facility: PHYSICIAN GROUP | Age: 40
End: 2020-02-20
Payer: COMMERCIAL

## 2020-02-20 VITALS
OXYGEN SATURATION: 95 % | DIASTOLIC BLOOD PRESSURE: 78 MMHG | HEART RATE: 81 BPM | BODY MASS INDEX: 39.82 KG/M2 | HEIGHT: 65 IN | WEIGHT: 239 LBS | TEMPERATURE: 97.6 F | SYSTOLIC BLOOD PRESSURE: 118 MMHG

## 2020-02-20 DIAGNOSIS — G47.33 OBSTRUCTIVE SLEEP APNEA: ICD-10-CM

## 2020-02-20 DIAGNOSIS — I10 HYPERTENSION, UNSPECIFIED TYPE: ICD-10-CM

## 2020-02-20 DIAGNOSIS — I51.89 LEFT VENTRICULAR SYSTOLIC DYSFUNCTION, NYHA CLASS 2: ICD-10-CM

## 2020-02-20 DIAGNOSIS — E11.65 UNCONTROLLED TYPE 2 DIABETES MELLITUS WITH HYPERGLYCEMIA (HCC): ICD-10-CM

## 2020-02-20 DIAGNOSIS — I50.20 ACC/AHA STAGE C SYSTOLIC HEART FAILURE (HCC): ICD-10-CM

## 2020-02-20 DIAGNOSIS — N17.9 ACUTE KIDNEY INJURY (HCC): ICD-10-CM

## 2020-02-20 DIAGNOSIS — N52.9 ERECTILE DYSFUNCTION, UNSPECIFIED ERECTILE DYSFUNCTION TYPE: ICD-10-CM

## 2020-02-20 PROBLEM — E66.9 OBESITY (BMI 30-39.9): Status: ACTIVE | Noted: 2019-08-20

## 2020-02-20 PROCEDURE — 99214 OFFICE O/P EST MOD 30 MIN: CPT | Performed by: PHYSICIAN ASSISTANT

## 2020-02-20 RX ORDER — FUROSEMIDE 20 MG/1
20 TABLET ORAL DAILY
Qty: 90 TAB | Refills: 1 | Status: SHIPPED | OUTPATIENT
Start: 2020-02-20 | End: 2020-02-25

## 2020-02-20 RX ORDER — METFORMIN HYDROCHLORIDE 500 MG/1
500 TABLET, EXTENDED RELEASE ORAL 2 TIMES DAILY
Qty: 360 TAB | Refills: 1
Start: 2020-02-20 | End: 2020-05-06 | Stop reason: SDUPTHER

## 2020-02-21 PROBLEM — G47.33 OBSTRUCTIVE SLEEP APNEA: Status: ACTIVE | Noted: 2020-02-21

## 2020-02-21 RX ORDER — SPIRONOLACTONE 25 MG/1
12.5 TABLET ORAL DAILY
Qty: 90 TAB | Refills: 1
Start: 2020-02-21 | End: 2020-04-16 | Stop reason: SDUPTHER

## 2020-02-21 NOTE — PROGRESS NOTES
Subjective:   John Wills is a 39 y.o. male here today for follow-up on diabetes and other medical problems. Is an established patient of mine.    HPI:    Patient presents to the office today for routine follow-up on type 2 diabetes and other health issues.  Last visit with me was in November 2019.  I started seeing patient back in August of last year after he was hospitalized for decompensated heart failure.  Since that time, he has made extensive lifestyle changes to improve his overall health.  Has lost an additional 20 pounds since our last visit.  He is on low carbohydrate, low sugar diet. Recent A1c done earlier this month came back at 5.3.    He continues to follow with cardiology regarding his CHF. Is on furosemide 40 mg daily, spironolactone 25 mg daily, and carvedilol 25 mg BID. Most recent visit was in 11/2019 and no changes were made to his regimen at that time.    Was found to have worsening of renal function on recent lab work done earlier this month with BUN 34, creatinine 1.82 and GFR of 41--previously with normal renal function. I had him cut down his metformin to only 500 mg BID.     Since our last visit, he has undergone sleep study which was consistent with severe sleep apnea with hypoxia.  He recently underwent dedicated CPAP study and will be receiving his CPAP machine/supplies in the near future.    He mentions that he's been noticing some erectile dysfunction the last couple of months. He often is unable to sustain an erection. Wondering what could be causing this. He states his libido is still intact.       Current medicines (including changes today)  Current Outpatient Medications   Medication Sig Dispense Refill   • metFORMIN ER (GLUCOPHAGE XR) 500 MG TABLET SR 24 HR Take 1 Tab by mouth 2 times a day. 360 Tab 1   • furosemide (LASIX) 20 MG Tab Take 1 Tab by mouth every day. 90 Tab 1   • atorvastatin (LIPITOR) 20 MG Tab Take 1 Tab by mouth every day. 90 Tab 3   • spironolactone  "(ALDACTONE) 25 MG Tab Take 1 Tab by mouth every day. 90 Tab 1   • carvedilol (COREG) 25 MG Tab Take 1 Tab by mouth 2 times a day, with meals. 180 Tab 3   • [employer] LANCETS Misc 1 Each by Does not apply route every day. 100 Each 3   • Blood Glucose Test Strips Test strips order: Test strips for [employer] brand meter. Sig: use every morning before breakfast and prn ssx high or low sugar 100 Strip 3     No current facility-administered medications for this visit.      He  has a past medical history of Allergy, Apnea, sleep, Back pain, Chickenpox, Constipation, Cough, Daytime sleepiness, Diarrhea, Difficulty breathing, Dizziness, Fatigue, Frequent headaches, Frequent urination, Gasping for breath, GERD (gastroesophageal reflux disease), Hearing difficulty, Hypertension, Morning headache, Obesity, Painful joint, Rhinitis, Shortness of breath, Sleep apnea, Snoring, Sore muscles, Sweat, sweating, excessive, Swelling of lower extremity, Uncontrolled type 2 diabetes mellitus (HCC) (8/23/2019), Weakness, and Wheezing. He also has no past medical history of Asthma.    ROS  Pulmonary ROS: No shortness of breath  Cardiovascular ROS: No chest pain, No edema       Objective:     /78 (BP Location: Left arm, Patient Position: Sitting, BP Cuff Size: Adult)   Pulse 81   Temp 36.4 °C (97.6 °F) (Tympanic)   Ht 1.651 m (5' 5\")   Wt 108.4 kg (239 lb)   SpO2 95%  Body mass index is 39.77 kg/m².     Physical Exam:  Constitutional: Alert, well-appearing, no distress.  Skin: Warm, dry, good turgor, no rashes in visible areas.  Eye: Pupils are equal and round, conjunctiva clear, lids normal.  ENMT: Lips without lesions, moist mucus membranes.  Neck: Normal-appearing.  Respiratory: Unlabored respiratory effort, lungs clear to auscultation, no wheezes, no rhonchi.  Cardiovascular: Normal S1, S2, no murmur, no lower extremity edema.      Assessment and Plan:   The following treatment plan was discussed    1. Uncontrolled type 2 " diabetes mellitus with hyperglycemia (HCC)  Established problem, now very well-controlled. Metformin has been decreased to 500 mg BID given renal function. Should continue with low-carbohydrate diet, weight loss.   - metFORMIN ER (GLUCOPHAGE XR) 500 MG TABLET SR 24 HR; Take 1 Tab by mouth 2 times a day.  Dispense: 360 Tab; Refill: 1    2. ACC/AHA stage C systolic heart failure (HCC)  3. Left ventricular systolic dysfunction, NYHA class 2  Established problems, well-controlled with medical management. I am decreasing Lasix to 20 mg daily and spironolactone to 12.5 mg daily given STEPHEN with suspicion for pre-renal azotemia/overdiuresis. Will need close monitoring of renal function going forward. Continue carvedilol at current dose.  - furosemide (LASIX) 20 MG Tab; Take 1 Tab by mouth every day.  Dispense: 90 Tab; Refill: 1    4. Hypertension, unspecified type  Established problem, well-controlled but needing adjustment of medication as described above.     5. Acute kidney injury (HCC)  New problem, uncontrolled and needing further evaluation. He is now on lower dose of metformin. Lasix and spironolactone doses will be halved as noted above. Will obtain repeat renal function in 2 weeks along with U/A, MAUCR, and follow-up appointment. Consider renal US, nephrology referral if no/minimal improvement.  - URINALYSIS,CULTURE IF INDICATED; Future  - MICROALBUMIN CREAT RATIO URINE; Future  - Renal Function Panel; Future    6. Obstructive sleep apnea  New problem since last appointment, uncontrolled but will be starting CPAP treatment shortly. He will continue to follow closely with [facility].    7. Erectile dysfunction, unspecified erectile dysfunction type  New problem, uncontrolled. Had discussion with patient regarding this condition. Suspect vasculogenic in etiology secondary to current medical problems including HTN, CHF, DM, dyslipidemia, obesity, renal dysfunction. We discussed use of medication like Viagra  but he wishes to hold off for the time-being and will monitor.       Followup: Return in about 2 weeks (around 3/5/2020) for f/u STE[facility]; Short.    Yakelin Ramey P.A.-C.

## 2020-02-24 PROBLEM — N52.9 ERECTILE DYSFUNCTION: Status: ACTIVE | Noted: 2020-02-24

## 2020-02-25 ENCOUNTER — OFFICE VISIT (OUTPATIENT)
Dept: CARDIOLOGY | Facility: MEDICAL CENTER | Age: 40
End: 2020-02-25
Payer: COMMERCIAL

## 2020-02-25 VITALS
BODY MASS INDEX: 39.99 KG/M2 | SYSTOLIC BLOOD PRESSURE: 132 MMHG | HEIGHT: 65 IN | HEART RATE: 86 BPM | WEIGHT: 240 LBS | DIASTOLIC BLOOD PRESSURE: 92 MMHG | OXYGEN SATURATION: 96 %

## 2020-02-25 DIAGNOSIS — E11.8 TYPE 2 DIABETES MELLITUS WITH COMPLICATION, WITHOUT LONG-TERM CURRENT USE OF INSULIN (HCC): ICD-10-CM

## 2020-02-25 DIAGNOSIS — E11.69 DYSLIPIDEMIA ASSOCIATED WITH TYPE 2 DIABETES MELLITUS (HCC): ICD-10-CM

## 2020-02-25 DIAGNOSIS — E78.5 DYSLIPIDEMIA ASSOCIATED WITH TYPE 2 DIABETES MELLITUS (HCC): ICD-10-CM

## 2020-02-25 DIAGNOSIS — R06.02 SOB (SHORTNESS OF BREATH): ICD-10-CM

## 2020-02-25 DIAGNOSIS — I11.0 HYPERTENSIVE HEART DISEASE WITH CHRONIC SYSTOLIC CONGESTIVE HEART FAILURE (HCC): ICD-10-CM

## 2020-02-25 DIAGNOSIS — I50.20 ACC/AHA STAGE C SYSTOLIC HEART FAILURE (HCC): ICD-10-CM

## 2020-02-25 DIAGNOSIS — G47.33 OBSTRUCTIVE SLEEP APNEA SYNDROME: ICD-10-CM

## 2020-02-25 DIAGNOSIS — I50.9 HEART FAILURE, NYHA CLASS 1 (HCC): ICD-10-CM

## 2020-02-25 DIAGNOSIS — Z79.899 HIGH RISK MEDICATION USE: ICD-10-CM

## 2020-02-25 DIAGNOSIS — I50.22 HYPERTENSIVE HEART DISEASE WITH CHRONIC SYSTOLIC CONGESTIVE HEART FAILURE (HCC): ICD-10-CM

## 2020-02-25 PROCEDURE — 94618 PULMONARY STRESS TESTING: CPT | Performed by: NURSE PRACTITIONER

## 2020-02-25 PROCEDURE — 99214 OFFICE O/P EST MOD 30 MIN: CPT | Mod: 25 | Performed by: NURSE PRACTITIONER

## 2020-02-25 ASSESSMENT — MINNESOTA LIVING WITH HEART FAILURE QUESTIONNAIRE (MLHF)
DIFFICULTY WITH SEXUAL ACTIVITIES: 4
WORKING AROUND THE HOUSE OR YARD DIFFICULT: 1
HAVING TO SIT OR LIE DOWN DURING THE DAY: 1
DIFFICULTY SOCIALIZING WITH FAMILY OR FRIENDS: 1
EATING LESS FOODS YOU LIKE: 3
SWELLING IN ANKLES OR LEGS: 0
FEELING LIKE A BURDEN TO FAMILY AND FRIENDS: 1
TOTAL_SCORE: 30
MAKING YOU SHORT OF BREATH: 1
WALKING ABOUT OR CLIMBING STAIRS DIFFICULT: 1
DIFFICULTY WORKING TO EARN A LIVING: 1
DIFFICULTY WITH RECREATIONAL PASTIMES, SPORTS, HOBBIES: 1
DIFFICULTY TO CONCENTRATE OR REMEMBERING THINGS: 1
TIRED, FATIGUED OR LOW ON ENERGY: 1
MAKING YOU STAY IN A HOSPITAL: 0
MAKING YOU FEEL DEPRESSED: 1
COSTING YOU MONEY FOR MEDICAL CARE: 3
DIFFICULTY GOING AWAY FROM HOME: 1
GIVING YOU SIDE EFFECTS FROM TREATMENTS: 3
MAKING YOU WORRY: 3
LOSS OF SELF CONTROL IN YOUR LIFE: 1
DIFFICULTY SLEEPING WELL AT NIGHT: 1

## 2020-02-25 ASSESSMENT — ENCOUNTER SYMPTOMS
ORTHOPNEA: 0
MYALGIAS: 0
CLAUDICATION: 0
SHORTNESS OF BREATH: 0
FEVER: 0
PALPITATIONS: 0
COUGH: 0
DIZZINESS: 0
ABDOMINAL PAIN: 0
PND: 0

## 2020-02-25 ASSESSMENT — 6 MINUTE WALK TEST (6MWT): TOTAL DISTANCE WALKED (METERS): 407

## 2020-02-26 NOTE — PROGRESS NOTES
Chief Complaint   Patient presents with   • Congestive Heart Failure       Subjective:   John Wills is a 39 y.o. male who presents today for follow-up on his heart failure.    Patient of the heart failure clinic.  He was last seen in clinic on 11/25/2019 with Dr. Holden.  During his last visit, no changes were made to his medications.  He was recommended to follow-up with pulmonary for sleep study.    Patient recently was seen by PCP last week, she was concerned about his kidney function, his metformin was reduced to 500 mg twice a day, furosemide was decreased to 10 mg daily and spironolactone was cut in half to 12.5 mg daily.  Patient does have upcoming labs next week.    For his symptoms, Patient feels well, denies chest pain, shortness of breath, palpitations, dizziness/lightheadedness, orthopnea, PND or Edema.     He reports his home weights are ranging from 235-240 pounds.    Patient was diagnosed with sleep apnea, he plans on starting CPAP soon once his coverage gets sorted out.    At 6 minute walk test re-evaluation, patient was able to complete 407 m during his 6 minute walk test.  His O2 saturation at baseline was 96 % and at the end of the test, the O2 saturation was 95 %.  He reported level 0.5 of dyspnea on Nirav scale. MLWHF 30    Additonally, patient has the following medical problems:    -Diabetes: Controlled, taking metformin, current A1c 5.3    -Sleep apnea: Pending treatment    -Obesity    -Hypertension    -Dyslipidemia    Past Medical History:   Diagnosis Date   • Allergy    • Apnea, sleep    • Back pain    • CHF (congestive heart failure) ([facility])    • Chickenpox    • Constipation    • Cough    • Daytime sleepiness    • Diarrhea    • Difficulty breathing    • Dizziness    • Fatigue    • Frequent headaches    • Frequent urination    • Gasping for breath    • GERD (gastroesophageal reflux disease)    • Hearing difficulty    • Hypertension    • Morning headache    • Obesity    • Painful joint    •  Rhinitis    • Shortness of breath    • Sleep apnea    • Snoring    • Sore muscles    • Sweat, sweating, excessive    • Swelling of lower extremity    • Uncontrolled type 2 diabetes mellitus ([facility]) 2019   • Weakness    • Wheezing      Past Surgical History:   Procedure Laterality Date   • TYMPANOMASTOIDECTOMY Left      Family History   Problem Relation Age of Onset   • Hypertension Maternal Grandmother    • Sleep Apnea Father    • Hypertension Father    • Other Sister         gastroparesis     Social History     Socioeconomic History   • Marital status:      Spouse name: Not on file   • Number of children: Not on file   • Years of education: Not on file   • Highest education level: Not on file   Occupational History   • Not on file   Social Needs   • Financial resource strain: Not on file   • Food insecurity     Worry: Not on file     Inability: Not on file   • Transportation needs     Medical: Not on file     Non-medical: Not on file   Tobacco Use   • Smoking status: Former Smoker     Packs/day: 0.50     Years: 10.00     Pack years: 5.00     Types: Cigarettes     Start date: 3/16/1996     Last attempt to quit: 2008     Years since quittin.2   • Smokeless tobacco: Never Used   • Tobacco comment: Quit full packs in . Smokes 1 cigarette per day currently   Substance and Sexual Activity   • Alcohol use: Never   • Drug use: Never   • Sexual activity: Not on file   Lifestyle   • Physical activity     Days per week: Not on file     Minutes per session: Not on file   • Stress: Not on file   Relationships   • Social connections     Talks on phone: Not on file     Gets together: Not on file     Attends [employer] service: Not on file     Active member of club or organization: Not on file     Attends meetings of clubs or organizations: Not on file     Relationship status: Not on file   • Intimate partner violence     Fear of current or ex partner: Not on file     Emotionally abused: Not on file      "Physically abused: Not on file     Forced sexual activity: Not on file   Other Topics Concern   • Not on file   Social History Narrative   • Not on file     Allergies   Allergen Reactions   • Sulfa Drugs Rash     Heat rash      Outpatient Encounter Medications as of 2/25/2020   Medication Sig Dispense Refill   • spironolactone (ALDACTONE) 25 MG Tab Take 0.5 Tabs by mouth every day. 90 Tab 1   • metFORMIN ER (GLUCOPHAGE XR) 500 MG TABLET SR 24 HR Take 1 Tab by mouth 2 times a day. 360 Tab 1   • atorvastatin (LIPITOR) 20 MG Tab Take 1 Tab by mouth every day. 90 Tab 3   • carvedilol (COREG) 25 MG Tab Take 1 Tab by mouth 2 times a day, with meals. 180 Tab 3   • [DISCONTINUED] furosemide (LASIX) 20 MG Tab Take 1 Tab by mouth every day. (Patient taking differently: Take 10 mg by mouth every day.) 90 Tab 1   • [employer] LANCETS Misc 1 Each by Does not apply route every day. 100 Each 3   • Blood Glucose Test Strips Test strips order: Test strips for [employer] brand meter. Sig: use every morning before breakfast and prn ssx high or low sugar 100 Strip 3     No facility-administered encounter medications on file as of 2/25/2020.      Review of Systems   Constitutional: Negative for fever and malaise/fatigue.   Respiratory: Negative for cough and shortness of breath.    Cardiovascular: Negative for chest pain, palpitations, orthopnea, claudication, leg swelling and PND.   Gastrointestinal: Negative for abdominal pain.   Musculoskeletal: Negative for myalgias.   Neurological: Negative for dizziness.   All other systems reviewed and are negative.       Objective:   /92 (BP Location: Left arm, Patient Position: Sitting, BP Cuff Size: Adult)   Pulse 86   Ht 1.651 m (5' 5\")   Wt 108.9 kg (240 lb)   SpO2 96%   BMI 39.94 kg/m²     Physical Exam   Constitutional: He is oriented to person, place, and time. He appears well-developed and well-nourished.   HENT:   Head: Normocephalic and atraumatic.   Eyes: Pupils are equal, " round, and reactive to light. EOM are normal.   Neck: Normal range of motion. Neck supple. No JVD present.   Cardiovascular: Normal rate, regular rhythm and normal heart sounds.   Pulmonary/Chest: Effort normal and breath sounds normal. No respiratory distress. He has no wheezes. He has no rales.   Abdominal: Soft. Bowel sounds are normal.   Musculoskeletal:         General: No edema.   Neurological: He is alert and oriented to person, place, and time.   Skin: Skin is warm and dry.   Psychiatric: He has a normal mood and affect. His behavior is normal.   Vitals reviewed.    Lab Results   Component Value Date/Time    CHOLSTRLTOT 116 02/12/2020 10:33 AM    LDL 47 02/12/2020 10:33 AM    HDL 30 (A) 02/12/2020 10:33 AM    TRIGLYCERIDE 193 (H) 02/12/2020 10:33 AM       Lab Results   Component Value Date/Time    SODIUM 136 02/12/2020 10:33 AM    POTASSIUM 4.0 02/12/2020 10:33 AM    CHLORIDE 102 02/12/2020 10:33 AM    CO2 26 02/12/2020 10:33 AM    GLUCOSE 94 02/12/2020 10:33 AM    BUN 34 (H) 02/12/2020 10:33 AM    CREATININE 1.82 (H) 02/12/2020 10:33 AM     Lab Results   Component Value Date/Time    ALKPHOSPHAT 58 02/12/2020 10:33 AM    ASTSGOT 20 02/12/2020 10:33 AM    ALTSGPT 21 02/12/2020 10:33 AM    TBILIRUBIN 0.5 02/12/2020 10:33 AM      Results for CAMPOS, KEERTHI SAE (MRN [id number]) as of 2/25/2020 17:02   Ref. Range 2/12/2020 10:33   Glycohemoglobin Latest Ref Range: 0.0 - 5.6 % 5.3       Transthoracic Echo Report 8/21/2019  No prior study is available for comparison.   Normal left ventricular chamber size.  Left ventricular ejection fraction is visually estimated to be 40%.  Normal inferior vena cava size and inspiratory collapse.  Poor valvular visualization.     Transthoracic Echo Report 11/4/2019  Compared to the images of the prior study done 08/21/2019, the EF has normalized.  Moderate concentric left ventricular hypertrophy.  Left ventricular ejection fraction is visually estimated to be 55%.  Unable to estimate  pulmonary artery pressure due to an inadequate tricuspid regurgitant jet.    Assessment:     1. ACC/AHA stage C systolic heart failure (HCC)     2. Heart failure, NYHA class 1 (HCC)     3. Hypertensive heart disease with chronic systolic congestive heart failure (HCC)     4. High risk medication use     5. Type 2 diabetes mellitus with complication, without long-term current use of insulin (HCC)     6. Dyslipidemia associated with type 2 diabetes mellitus (HCC)     7. Obstructive sleep apnea syndrome     8. SOB (shortness of breath)         Medical Decision Making:  Today's Assessment / Status / Plan:   1. HFrEF, Stage C, Class 1, LVEF 55% improved from 40%: Based on physical examination findings, patient is euvolemic. No JVD, lungs are clear to auscultation, no pitting edema in bilateral lower extremities, no ascites.  -Heart failure due to hypertension, comorbid obesity and sleep apnea  -Discussed concern over recent kidney function, patient appears euvolemic and can stop furosemide  -Continue spironolactone 12.5 mg daily (may need to discontinue if kidney function continues to be impaired)  -Continue carvedilol 25 mg twice a day  -Patient was on lisinopril, no longer on lisinopril will look into this at next visit, but will hold in light of worsening kidney function  -No indication for ICD due to improvement of LVEF  -Reinforced s/sx of worsening heart failure with patient and weight monitoring. Pt verbalizes understanding. Pt to call office or RTC if present.     2.  Hypertension: Stable  -Continue recommendations per above    3.  Sleep apnea:  -Encouraged treatment, patient plans on starting once he is able to get the equipment    4.  Dyslipidemia: Last LDL 47 on 2/12/2020  -Continue atorvastatin 20 mg daily    5.  Diabetes:  -Continue metformin 500 mg twice a day    FU in clinic in 4 to 6 weeks with labs ordered by PCP. Sooner if needed.    Patient verbalizes understanding and agrees with the plan of care.      Collaborating MD: John Anderson MD

## 2020-03-04 ENCOUNTER — HOSPITAL ENCOUNTER (OUTPATIENT)
Dept: LAB | Facility: MEDICAL CENTER | Age: 40
End: 2020-03-04
Attending: PHYSICIAN ASSISTANT
Payer: COMMERCIAL

## 2020-03-04 DIAGNOSIS — N17.9 ACUTE KIDNEY INJURY (HCC): ICD-10-CM

## 2020-03-04 LAB
ALBUMIN SERPL BCP-MCNC: 4.5 G/DL (ref 3.2–4.9)
APPEARANCE UR: CLEAR
BILIRUB UR QL STRIP.AUTO: NEGATIVE
BUN SERPL-MCNC: 33 MG/DL (ref 8–22)
CALCIUM SERPL-MCNC: 9.9 MG/DL (ref 8.5–10.5)
CHLORIDE SERPL-SCNC: 101 MMOL/L (ref 96–112)
CO2 SERPL-SCNC: 26 MMOL/L (ref 20–33)
COLOR UR: YELLOW
CREAT SERPL-MCNC: 1.68 MG/DL (ref 0.5–1.4)
CREAT UR-MCNC: 175.5 MG/DL
GLUCOSE SERPL-MCNC: 88 MG/DL (ref 65–99)
GLUCOSE UR STRIP.AUTO-MCNC: NEGATIVE MG/DL
KETONES UR STRIP.AUTO-MCNC: NEGATIVE MG/DL
LEUKOCYTE ESTERASE UR QL STRIP.AUTO: NEGATIVE
MICRO URNS: NORMAL
MICROALBUMIN UR-MCNC: 2.1 MG/DL
MICROALBUMIN/CREAT UR: 12 MG/G (ref 0–30)
NITRITE UR QL STRIP.AUTO: NEGATIVE
PH UR STRIP.AUTO: 5.5 [PH] (ref 5–8)
PHOSPHATE SERPL-MCNC: 3.4 MG/DL (ref 2.5–4.5)
POTASSIUM SERPL-SCNC: 3.9 MMOL/L (ref 3.6–5.5)
PROT UR QL STRIP: NEGATIVE MG/DL
RBC UR QL AUTO: NEGATIVE
SODIUM SERPL-SCNC: 137 MMOL/L (ref 135–145)
SP GR UR STRIP.AUTO: 1.02
UROBILINOGEN UR STRIP.AUTO-MCNC: 0.2 MG/DL

## 2020-03-04 PROCEDURE — 80069 RENAL FUNCTION PANEL: CPT

## 2020-03-04 PROCEDURE — 81003 URINALYSIS AUTO W/O SCOPE: CPT

## 2020-03-04 PROCEDURE — 36415 COLL VENOUS BLD VENIPUNCTURE: CPT

## 2020-03-04 PROCEDURE — 82043 UR ALBUMIN QUANTITATIVE: CPT

## 2020-03-04 PROCEDURE — 82570 ASSAY OF URINE CREATININE: CPT

## 2020-03-05 ENCOUNTER — PATIENT MESSAGE (OUTPATIENT)
Dept: HEALTH INFORMATION MANAGEMENT | Facility: OTHER | Age: 40
End: 2020-03-05

## 2020-03-05 ENCOUNTER — OFFICE VISIT (OUTPATIENT)
Dept: MEDICAL GROUP | Facility: PHYSICIAN GROUP | Age: 40
End: 2020-03-05
Payer: COMMERCIAL

## 2020-03-05 VITALS
WEIGHT: 241 LBS | HEIGHT: 65 IN | OXYGEN SATURATION: 95 % | TEMPERATURE: 98.4 F | DIASTOLIC BLOOD PRESSURE: 88 MMHG | BODY MASS INDEX: 40.15 KG/M2 | SYSTOLIC BLOOD PRESSURE: 122 MMHG | HEART RATE: 84 BPM

## 2020-03-05 DIAGNOSIS — E11.8 TYPE 2 DIABETES MELLITUS WITH COMPLICATION, WITHOUT LONG-TERM CURRENT USE OF INSULIN (HCC): ICD-10-CM

## 2020-03-05 DIAGNOSIS — N17.9 ACUTE KIDNEY INJURY (HCC): ICD-10-CM

## 2020-03-05 DIAGNOSIS — I51.89 LEFT VENTRICULAR SYSTOLIC DYSFUNCTION, NYHA CLASS 2: ICD-10-CM

## 2020-03-05 PROBLEM — E87.6 HYPOKALEMIA: Status: RESOLVED | Noted: 2019-08-20 | Resolved: 2020-03-05

## 2020-03-05 PROBLEM — R79.89 ELEVATED TROPONIN I LEVEL: Status: RESOLVED | Noted: 2019-08-20 | Resolved: 2020-03-05

## 2020-03-05 PROCEDURE — 99214 OFFICE O/P EST MOD 30 MIN: CPT | Performed by: FAMILY MEDICINE

## 2020-03-05 ASSESSMENT — FIBROSIS 4 INDEX: FIB4 SCORE: 1

## 2020-03-06 NOTE — PROGRESS NOTES
"cc: DMII      Subjective:     John Wills is a 39 y.o. male presenting for the following:     CHF: last week, patient was seen by vascular medicine and was taken off of the furosemide.  He has been feeling well.  He is taking spironolactone and carvedilol.  He denies any chest pain, shortness of breath, swelling.    DMII: patient has stopped soda and sugar. Has lost about 50 Ibs. Fasting BG of 100-110 normally. Highest blood sugar in the last 3 months is 144 in the morning. Lowest is 97. Patient denies any polyuria/polydipsia, rashes, recurrent infections, changes in vision, changes in sensation.    He did not notice much of a difference when he decreased the metformin in his BG levels.     Review of systems:  All others reviewed and are negative.       Current Outpatient Medications:   •  spironolactone (ALDACTONE) 25 MG Tab, Take 0.5 Tabs by mouth every day., Disp: 90 Tab, Rfl: 1  •  metFORMIN ER (GLUCOPHAGE XR) 500 MG TABLET SR 24 HR, Take 1 Tab by mouth 2 times a day., Disp: 360 Tab, Rfl: 1  •  atorvastatin (LIPITOR) 20 MG Tab, Take 1 Tab by mouth every day., Disp: 90 Tab, Rfl: 3  •  carvedilol (COREG) 25 MG Tab, Take 1 Tab by mouth 2 times a day, with meals., Disp: 180 Tab, Rfl: 3  •  [employer] LANCETS Misc, 1 Each by Does not apply route every day., Disp: 100 Each, Rfl: 3  •  Blood Glucose Test Strips, Test strips order: Test strips for [employer] brand meter. Sig: use every morning before breakfast and prn ssx high or low sugar, Disp: 100 Strip, Rfl: 3    Allergies, past medical history, past surgical history, family history, social history reviewed and updated    Objective:     Vitals: /88 (BP Location: Left arm, Patient Position: Sitting, BP Cuff Size: Adult)   Pulse 84   Temp 36.9 °C (98.4 °F) (Tympanic)   Ht 1.651 m (5' 5\")   Wt 109.3 kg (241 lb)   SpO2 95%   BMI 40.10 kg/m²   General: Alert, pleasant, NAD  HEENT: Normocephalic.   EOMI, no icterus or pallor.    Heart: " Regular rate and rhythm.  S1 and S2 normal.  No murmurs appreciated.  Respiratory: Normal respiratory effort.  Clear to auscultation bilaterally.  Psych:  Affect is normal, judgement is good, grooming is appropriate.    Assessment/Plan:     Diagnoses and all orders for this visit:    Type 2 diabetes mellitus with complication, without long-term current use of insulin (HCC): Very well controlled with last hemoglobin A1c of 5.3.  Patient has changed his diet and is doing well.  We have decreased patient's metformin dose for renal function.  -     Basic Metabolic Panel; Future    Left ventricular systolic dysfunction, NYHA class 2: Patient has been following up with vascular medicine and is feeling well.  ER precautions reviewed with patient today.    Acute kidney injury (HCC): We will monitor renal function carefully in light of patient's diuretics and metformin.  -     Basic Metabolic Panel; Future      Return in about 3 months (around 6/5/2020), or if symptoms worsen or fail to improve.

## 2020-03-24 ENCOUNTER — OFFICE VISIT (OUTPATIENT)
Dept: CARDIOLOGY | Facility: MEDICAL CENTER | Age: 40
End: 2020-03-24
Payer: COMMERCIAL

## 2020-03-24 VITALS
HEART RATE: 90 BPM | HEIGHT: 66 IN | DIASTOLIC BLOOD PRESSURE: 94 MMHG | BODY MASS INDEX: 37.37 KG/M2 | SYSTOLIC BLOOD PRESSURE: 146 MMHG | WEIGHT: 232.5 LBS

## 2020-03-24 DIAGNOSIS — I50.20 ACC/AHA STAGE C SYSTOLIC HEART FAILURE (HCC): ICD-10-CM

## 2020-03-24 DIAGNOSIS — I50.9 HEART FAILURE, NYHA CLASS 1 (HCC): ICD-10-CM

## 2020-03-24 DIAGNOSIS — E78.5 DYSLIPIDEMIA ASSOCIATED WITH TYPE 2 DIABETES MELLITUS (HCC): ICD-10-CM

## 2020-03-24 DIAGNOSIS — I50.22 HYPERTENSIVE HEART DISEASE WITH CHRONIC SYSTOLIC CONGESTIVE HEART FAILURE (HCC): ICD-10-CM

## 2020-03-24 DIAGNOSIS — Z79.899 HIGH RISK MEDICATION USE: ICD-10-CM

## 2020-03-24 DIAGNOSIS — I11.0 HYPERTENSIVE HEART DISEASE WITH CHRONIC SYSTOLIC CONGESTIVE HEART FAILURE (HCC): ICD-10-CM

## 2020-03-24 DIAGNOSIS — E11.69 DYSLIPIDEMIA ASSOCIATED WITH TYPE 2 DIABETES MELLITUS (HCC): ICD-10-CM

## 2020-03-24 DIAGNOSIS — I10 HTN (HYPERTENSION), MALIGNANT: ICD-10-CM

## 2020-03-24 DIAGNOSIS — G47.33 OBSTRUCTIVE SLEEP APNEA SYNDROME: ICD-10-CM

## 2020-03-24 DIAGNOSIS — E11.8 TYPE 2 DIABETES MELLITUS WITH COMPLICATION, WITHOUT LONG-TERM CURRENT USE OF INSULIN (HCC): ICD-10-CM

## 2020-03-24 PROCEDURE — 99442 PR PHYSICIAN TELEPHONE EVALUATION 11-20 MIN: CPT | Mod: CR | Performed by: NURSE PRACTITIONER

## 2020-03-24 RX ORDER — CARVEDILOL 25 MG/1
50 TABLET ORAL 2 TIMES DAILY WITH MEALS
Qty: 360 TAB | Refills: 3 | Status: SHIPPED | OUTPATIENT
Start: 2020-03-24 | End: 2021-03-14 | Stop reason: SDUPTHER

## 2020-03-24 ASSESSMENT — ENCOUNTER SYMPTOMS
COUGH: 0
ORTHOPNEA: 0
SHORTNESS OF BREATH: 0
CLAUDICATION: 0
ABDOMINAL PAIN: 0
PND: 0
MYALGIAS: 0
PALPITATIONS: 0
DIZZINESS: 0
FEVER: 0

## 2020-03-24 ASSESSMENT — FIBROSIS 4 INDEX: FIB4 SCORE: 1.02

## 2020-03-24 NOTE — PROGRESS NOTES
Chief Complaint   Patient presents with   • Congestive Heart Failure     1 MO F/V DX: CHF       Subjective:   As a means of avoiding spread of COVID-19, this visit is being conducted by telephone. This telephone visit was initiated by the patient and they verbally consented.    Time of beginning of call: 4:17 PM    John Wills is a 40 y.o. male who is following up on his heart failure.    Patient of the heart failure clinic.  He was last seen in clinic on   2/25/2020.  Patient was recommended to stop his furosemide to see if it helps with his kidney function.  Patient had labs due from his PCP.    For his symptoms, Patient feels well, denies chest pain, shortness of breath, palpitations, dizziness/lightheadedness, orthopnea, PND or Edema.  He has had some congestion/environmental allergies.  He took a dose of Mucinex last week.    He reports his weights have decreased slightly to 232 pounds.  He is starting to lose weight again.    Patient continues to work on getting treatment for his sleep apnea.    Patient has noticed over the past few days, he has had high blood pressure specifically diastolic blood pressure.    Today his home blood pressure is 146/94.    Additonally, patient has the following medical problems:    -Diabetes: Controlled, taking metformin, current A1c 5.3    -Sleep apnea: Pending treatment    -Obesity    -Hypertension    -Dyslipidemia    Past Medical History:   Diagnosis Date   • Allergy    • Apnea, sleep    • Back pain    • CHF (congestive heart failure) (HCC)    • Chickenpox    • Constipation    • Cough    • Daytime sleepiness    • Diarrhea    • Difficulty breathing    • Dizziness    • Elevated troponin I level 8/20/2019   • Fatigue    • Frequent headaches    • Frequent urination    • Gasping for breath    • GERD (gastroesophageal reflux disease)    • Hearing difficulty    • Hypertension    • Morning headache    • Obesity    • Painful joint    • Rhinitis    • Shortness of breath    • Sleep  apnea    • Snoring    • Sore muscles    • Sweat, sweating, excessive    • Swelling of lower extremity    • Uncontrolled type 2 diabetes mellitus ([facility]) 2019   • Weakness    • Wheezing      Past Surgical History:   Procedure Laterality Date   • TYMPANOMASTOIDECTOMY Left      Family History   Problem Relation Age of Onset   • Hypertension Maternal Grandmother    • Sleep Apnea Father    • Hypertension Father    • Other Sister         gastroparesis     Social History     Socioeconomic History   • Marital status:      Spouse name: Not on file   • Number of children: Not on file   • Years of education: Not on file   • Highest education level: Not on file   Occupational History   • Not on file   Social Needs   • Financial resource strain: Not on file   • Food insecurity     Worry: Not on file     Inability: Not on file   • Transportation needs     Medical: Not on file     Non-medical: Not on file   Tobacco Use   • Smoking status: Former Smoker     Packs/day: 0.50     Years: 10.00     Pack years: 5.00     Types: Cigarettes     Start date: 3/16/1996     Last attempt to quit: 2008     Years since quittin.3   • Smokeless tobacco: Never Used   • Tobacco comment: Quit full packs in . Smokes 1 cigarette per day currently   Substance and Sexual Activity   • Alcohol use: Never   • Drug use: Never   • Sexual activity: Not on file   Lifestyle   • Physical activity     Days per week: Not on file     Minutes per session: Not on file   • Stress: Not on file   Relationships   • Social connections     Talks on phone: Not on file     Gets together: Not on file     Attends [employer] service: Not on file     Active member of club or organization: Not on file     Attends meetings of clubs or organizations: Not on file     Relationship status: Not on file   • Intimate partner violence     Fear of current or ex partner: Not on file     Emotionally abused: Not on file     Physically abused: Not on file     Forced  "sexual activity: Not on file   Other Topics Concern   • Not on file   Social History Narrative   • Not on file     Allergies   Allergen Reactions   • Sulfa Drugs Rash     Heat rash      Outpatient Encounter Medications as of 3/24/2020   Medication Sig Dispense Refill   • carvedilol (COREG) 25 MG Tab Take 2 Tabs by mouth 2 times a day, with meals. 360 Tab 3   • spironolactone (ALDACTONE) 25 MG Tab Take 0.5 Tabs by mouth every day. 90 Tab 1   • metFORMIN ER (GLUCOPHAGE XR) 500 MG TABLET SR 24 HR Take 1 Tab by mouth 2 times a day. 360 Tab 1   • atorvastatin (LIPITOR) 20 MG Tab Take 1 Tab by mouth every day. 90 Tab 3   • [employer]ANC[employer] Misc 1 Each by Does not apply route every day. 100 Each 3   • Blood Glucose Test Strips Test strips order: Test strips for [employer] brand meter. Sig: use every morning before breakfast and prn ssx high or low sugar 100 Strip 3   • [DISCONTINUED] carvedilol (COREG) 25 MG Tab Take 1 Tab by mouth 2 times a day, with meals. 180 Tab 3     No facility-administered encounter medications on file as of 3/24/2020.      Review of Systems   Constitutional: Negative for fever and malaise/fatigue.   HENT: Positive for congestion.    Respiratory: Negative for cough and shortness of breath.    Cardiovascular: Negative for chest pain, palpitations, orthopnea, claudication, leg swelling and PND.   Gastrointestinal: Negative for abdominal pain.   Musculoskeletal: Negative for myalgias.   Neurological: Negative for dizziness.   Endo/Heme/Allergies: Positive for environmental allergies.   All other systems reviewed and are negative.       Objective:   /94 (BP Location: Left arm, Patient Position: Sitting, BP Cuff Size: Adult)   Pulse 90   Ht 1.676 m (5' 6\")   Wt 105.5 kg (232 lb 8 oz)   BMI 37.53 kg/m²     Physical Exam  Lab Results   Component Value Date/Time    CHOLSTRLTOT 116 02/12/2020 10:33 AM    LDL 47 02/12/2020 10:33 AM    HDL 30 (A) 02/12/2020 10:33 AM    TRIGLYCERIDE 193 (H) 02/12/2020 " 10:33 AM       Lab Results   Component Value Date/Time    SODIUM 137 03/04/2020 06:27 AM    POTASSIUM 3.9 03/04/2020 06:27 AM    CHLORIDE 101 03/04/2020 06:27 AM    CO2 26 03/04/2020 06:27 AM    GLUCOSE 88 03/04/2020 06:27 AM    BUN 33 (H) 03/04/2020 06:27 AM    CREATININE 1.68 (H) 03/04/2020 06:27 AM     Lab Results   Component Value Date/Time    ALKPHOSPHAT 58 02/12/2020 10:33 AM    ASTSGOT 20 02/12/2020 10:33 AM    ALTSGPT 21 02/12/2020 10:33 AM    TBILIRUBIN 0.5 02/12/2020 10:33 AM      Results for KEERTHI CAMPOS (MRN [id number]) as of 2/25/2020 17:02   Ref. Range 2/12/2020 10:33   Glycohemoglobin Latest Ref Range: 0.0 - 5.6 % 5.3       Transthoracic Echo Report 8/21/2019  No prior study is available for comparison.   Normal left ventricular chamber size.  Left ventricular ejection fraction is visually estimated to be 40%.  Normal inferior vena cava size and inspiratory collapse.  Poor valvular visualization.     Transthoracic Echo Report 11/4/2019  Compared to the images of the prior study done 08/21/2019, the EF has normalized.  Moderate concentric left ventricular hypertrophy.  Left ventricular ejection fraction is visually estimated to be 55%.  Unable to estimate pulmonary artery pressure due to an inadequate tricuspid regurgitant jet.    Assessment:     1. Hypertensive heart disease with chronic systolic congestive heart failure (HCC)  Basic Metabolic Panel   2. HTN (hypertension), malignant  Basic Metabolic Panel   3. ACC/AHA stage C systolic heart failure (HCC)     4. Heart failure, NYHA class 1 (HCC)     5. High risk medication use     6. Obstructive sleep apnea syndrome     7. Dyslipidemia associated with type 2 diabetes mellitus (HCC)     8. Type 2 diabetes mellitus with complication, without long-term current use of insulin ([facility])         Medical Decision Making:  Today's Assessment / Status / Plan:   1.  Hypertension: Borderline  -Increase carvedilol to 50 mg twice a day  -Did discuss with patient  about starting ACE/ARB.  He would like to hold off and try the carvedilol first before introducing new medication that can affect his kidney function.  -Continue spironolactone 12.5 mg daily  -Monitor and log Blood pressures at home, 2 hours after medications. Call office or RTC if BP increasing or >180/100 or if symptoms of elevated blood pressure present. Reviewed s/sx of stroke and heart attack. Patient to go to ER or call 911 if present.       2.  HFrEF, Stage C, Class 1, LVEF 55% improved from 40%: Based on patient symptoms, he continues to remain stable.  -Heart failure due to hypertension, comorbid obesity and sleep apnea  -Furosemide discontinued at last visit  -Increase carvedilol to 50 mg twice a day  -Continue spironolactone 12.5 mg daily (may need to discontinue if kidney function continues to be impaired)  -Patient was on lisinopril, no longer on lisinopril will look into this at next visit, but will hold in light of worsening kidney function  -No indication for ICD due to improvement of LVEF  -Reinforced s/sx of worsening heart failure with patient and weight monitoring. Pt verbalizes understanding. Pt to call office or RTC if present.     3.  Sleep apnea:  -Encouraged treatment, patient plans on starting once he is able to get the equipment    4.  Dyslipidemia: Last LDL 47 on 2/12/2020  -Continue atorvastatin 20 mg daily    5.  Diabetes:  -Continue metformin 500 mg twice a day    FU in clinic in 3 months with labs. Sooner if needed.    Patient verbalizes understanding and agrees with the plan of care.     Collaborating MD: Aristeo Holden MD    Time at End of Call: 4:30 PM

## 2020-04-13 DIAGNOSIS — I51.89 LEFT VENTRICULAR SYSTOLIC DYSFUNCTION, NYHA CLASS 2: ICD-10-CM

## 2020-04-13 DIAGNOSIS — I50.20 ACC/AHA STAGE C SYSTOLIC HEART FAILURE (HCC): ICD-10-CM

## 2020-04-14 RX ORDER — SPIRONOLACTONE 25 MG/1
12.5 TABLET ORAL DAILY
Qty: 90 TAB | Refills: 1 | OUTPATIENT
Start: 2020-04-14

## 2020-04-16 DIAGNOSIS — I50.20 ACC/AHA STAGE C SYSTOLIC HEART FAILURE (HCC): ICD-10-CM

## 2020-04-16 DIAGNOSIS — I51.89 LEFT VENTRICULAR SYSTOLIC DYSFUNCTION, NYHA CLASS 2: ICD-10-CM

## 2020-04-17 RX ORDER — SPIRONOLACTONE 25 MG/1
12.5 TABLET ORAL DAILY
Qty: 90 TAB | Refills: 0 | Status: SHIPPED | OUTPATIENT
Start: 2020-04-17 | End: 2020-10-08

## 2020-05-01 ENCOUNTER — HOSPITAL ENCOUNTER (OUTPATIENT)
Dept: LAB | Facility: MEDICAL CENTER | Age: 40
End: 2020-05-01
Attending: FAMILY MEDICINE
Payer: COMMERCIAL

## 2020-05-01 DIAGNOSIS — I11.0 HYPERTENSIVE HEART DISEASE WITH CHRONIC SYSTOLIC CONGESTIVE HEART FAILURE (HCC): ICD-10-CM

## 2020-05-01 DIAGNOSIS — I50.22 HYPERTENSIVE HEART DISEASE WITH CHRONIC SYSTOLIC CONGESTIVE HEART FAILURE (HCC): ICD-10-CM

## 2020-05-01 DIAGNOSIS — I10 HTN (HYPERTENSION), MALIGNANT: ICD-10-CM

## 2020-05-01 LAB
ANION GAP SERPL CALC-SCNC: 13 MMOL/L (ref 7–16)
BUN SERPL-MCNC: 33 MG/DL (ref 8–22)
CALCIUM SERPL-MCNC: 9.5 MG/DL (ref 8.5–10.5)
CHLORIDE SERPL-SCNC: 101 MMOL/L (ref 96–112)
CO2 SERPL-SCNC: 23 MMOL/L (ref 20–33)
CREAT SERPL-MCNC: 1.46 MG/DL (ref 0.5–1.4)
FASTING STATUS PATIENT QL REPORTED: NORMAL
GLUCOSE SERPL-MCNC: 82 MG/DL (ref 65–99)
POTASSIUM SERPL-SCNC: 4 MMOL/L (ref 3.6–5.5)
SODIUM SERPL-SCNC: 137 MMOL/L (ref 135–145)

## 2020-05-01 PROCEDURE — 80048 BASIC METABOLIC PNL TOTAL CA: CPT

## 2020-05-01 PROCEDURE — 36415 COLL VENOUS BLD VENIPUNCTURE: CPT

## 2020-05-06 ENCOUNTER — OFFICE VISIT (OUTPATIENT)
Dept: MEDICAL GROUP | Facility: PHYSICIAN GROUP | Age: 40
End: 2020-05-06
Payer: COMMERCIAL

## 2020-05-06 VITALS
TEMPERATURE: 98.6 F | OXYGEN SATURATION: 96 % | HEART RATE: 70 BPM | WEIGHT: 244.6 LBS | BODY MASS INDEX: 40.75 KG/M2 | DIASTOLIC BLOOD PRESSURE: 82 MMHG | HEIGHT: 65 IN | SYSTOLIC BLOOD PRESSURE: 116 MMHG

## 2020-05-06 DIAGNOSIS — I10 HYPERTENSION, UNSPECIFIED TYPE: ICD-10-CM

## 2020-05-06 DIAGNOSIS — G47.33 OBSTRUCTIVE SLEEP APNEA: ICD-10-CM

## 2020-05-06 DIAGNOSIS — D75.1 POLYCYTHEMIA: ICD-10-CM

## 2020-05-06 DIAGNOSIS — E11.8 TYPE 2 DIABETES MELLITUS WITH COMPLICATION, WITHOUT LONG-TERM CURRENT USE OF INSULIN (HCC): ICD-10-CM

## 2020-05-06 DIAGNOSIS — N18.30 STAGE 3 CHRONIC KIDNEY DISEASE: ICD-10-CM

## 2020-05-06 PROBLEM — N17.9 ACUTE KIDNEY INJURY (HCC): Status: RESOLVED | Noted: 2020-02-20 | Resolved: 2020-05-06

## 2020-05-06 PROCEDURE — 99214 OFFICE O/P EST MOD 30 MIN: CPT | Performed by: FAMILY MEDICINE

## 2020-05-06 RX ORDER — METFORMIN HYDROCHLORIDE 500 MG/1
500 TABLET, EXTENDED RELEASE ORAL DAILY
Qty: 90 TAB | Refills: 3 | Status: SHIPPED | OUTPATIENT
Start: 2020-05-06 | End: 2021-04-29 | Stop reason: SDUPTHER

## 2020-05-06 RX ORDER — LISINOPRIL 5 MG/1
5 TABLET ORAL DAILY
Qty: 90 TAB | Refills: 1 | Status: SHIPPED | OUTPATIENT
Start: 2020-05-06 | End: 2020-10-12 | Stop reason: SDUPTHER

## 2020-05-06 ASSESSMENT — FIBROSIS 4 INDEX: FIB4 SCORE: 1.02

## 2020-05-06 ASSESSMENT — PATIENT HEALTH QUESTIONNAIRE - PHQ9: CLINICAL INTERPRETATION OF PHQ2 SCORE: 0

## 2020-05-07 NOTE — PROGRESS NOTES
"cc: Diabetes      Subjective:     John Wills is a 40 y.o. male presenting for the following:     DM: Patient with diagnosis of diabetes with hemoglobin A1c of 9.3 in August.  He has completely changed his diet and is exercising regularly.  His fasting blood sugars of .  He has been taking metformin twice daily.  He denies side effect with this.Patient denies any polyuria/polydipsia, rashes, recurrent infections, changes in vision, changes in sensation.      He has now got a CPAP machine. Does feel better and has more energy.  He does use this every night. Patient denies any chest pain, shortness of breath, palpitations, swelling, or lightheadedness.      Review of systems:  All others reviewed and are negative.       Current Outpatient Medications:   •  metFORMIN ER (GLUCOPHAGE XR) 500 MG TABLET SR 24 HR, Take 1 Tab by mouth every day., Disp: 90 Tab, Rfl: 3  •  lisinopril (PRINIVIL) 5 MG Tab, Take 1 Tab by mouth every day., Disp: 90 Tab, Rfl: 1  •  spironolactone (ALDACTONE) 25 MG Tab, Take 0.5 Tabs by mouth every day., Disp: 90 Tab, Rfl: 0  •  carvedilol (COREG) 25 MG Tab, Take 2 Tabs by mouth 2 times a day, with meals., Disp: 360 Tab, Rfl: 3  •  atorvastatin (LIPITOR) 20 MG Tab, Take 1 Tab by mouth every day., Disp: 90 Tab, Rfl: 3  •  [employer] LANCETS Misc, 1 Each by Does not apply route every day., Disp: 100 Each, Rfl: 3  •  Blood Glucose Test Strips, Test strips order: Test strips for [employer] brand meter. Sig: use every morning before breakfast and prn ssx high or low sugar, Disp: 100 Strip, Rfl: 3    Allergies, past medical history, past surgical history, family history, social history reviewed and updated    Objective:     Vitals: /82 (BP Location: Left arm, Patient Position: Sitting, BP Cuff Size: Large adult)   Pulse 70   Temp 37 °C (98.6 °F) (Temporal)   Ht 1.651 m (5' 5\")   Wt 110.9 kg (244 lb 9.6 oz)   SpO2 96%   BMI 40.70 kg/m²   General: Alert, pleasant, " NAD  HEENT: Normocephalic.   EOMI, no icterus or pallor.   Heart: Regular rate and rhythm.  S1 and S2 normal.  No murmurs appreciated.  Respiratory: Normal respiratory effort.  Clear to auscultation bilaterally.  Extremities: No leg edema.    Neurological:CN2-12 grossly intact  Psych:  Affect is normal, judgement is good, grooming is appropriate.    Assessment/Plan:     John was seen today for follow-up.    Diagnoses and all orders for this visit:    Stage 3 chronic kidney disease (HCC) we will monitor for stability in 4 weeks time.  Recent check with GFR improving to 53.  -     Basic Metabolic Panel; Future    Type 2 diabetes mellitus with complication, without long-term current use of insulin (HCC): Patient has completely changed his lifestyle and has been doing well.  Last hemoglobin A1c of 5.3.  Will decrease metformin to once daily.  We will recheck hemoglobin A1c and consider discontinuing metformin if still less than 6.0.  -     metFORMIN ER (GLUCOPHAGE XR) 500 MG TABLET SR 24 HR; Take 1 Tab by mouth every day.  -     lisinopril (PRINIVIL) 5 MG Tab; Take 1 Tab by mouth every day.  -     HEMOGLOBIN A1C; Future    Hypertension, unspecified type: Patient's lisinopril has been discontinued for worsening renal function.  As this has now been improving will monitor with BMP in 4 weeks and restart very low-dose lisinopril.  Blood pressure well controlled today.  Will restart for renal protection.  -     lisinopril (PRINIVIL) 5 MG Tab; Take 1 Tab by mouth every day.    Patient now doing well and using CPAP regularly.  Will recheck CBC to ensure this is improving.  Polycythemia  -     CBC WITHOUT DIFFERENTIAL; Future  Obstructive sleep apnea      Return in about 3 months (around 8/6/2020), or if symptoms worsen or fail to improve.

## 2020-06-22 ENCOUNTER — SLEEP CENTER VISIT (OUTPATIENT)
Dept: SLEEP MEDICINE | Facility: MEDICAL CENTER | Age: 40
End: 2020-06-22
Payer: COMMERCIAL

## 2020-06-22 VITALS
HEART RATE: 67 BPM | WEIGHT: 242 LBS | HEIGHT: 65 IN | SYSTOLIC BLOOD PRESSURE: 118 MMHG | RESPIRATION RATE: 16 BRPM | OXYGEN SATURATION: 97 % | DIASTOLIC BLOOD PRESSURE: 66 MMHG | BODY MASS INDEX: 40.32 KG/M2

## 2020-06-22 DIAGNOSIS — F17.200 CURRENT SMOKER: ICD-10-CM

## 2020-06-22 DIAGNOSIS — I50.20 ACC/AHA STAGE C SYSTOLIC HEART FAILURE (HCC): ICD-10-CM

## 2020-06-22 DIAGNOSIS — G47.33 OBSTRUCTIVE SLEEP APNEA: ICD-10-CM

## 2020-06-22 DIAGNOSIS — I10 HYPERTENSION, UNSPECIFIED TYPE: ICD-10-CM

## 2020-06-22 DIAGNOSIS — D75.1 POLYCYTHEMIA: ICD-10-CM

## 2020-06-22 PROBLEM — E66.9 OBESITY (BMI 30-39.9): Status: RESOLVED | Noted: 2019-08-20 | Resolved: 2020-06-22

## 2020-06-22 PROCEDURE — 99214 OFFICE O/P EST MOD 30 MIN: CPT | Performed by: NURSE PRACTITIONER

## 2020-06-22 ASSESSMENT — FIBROSIS 4 INDEX: FIB4 SCORE: 1.02

## 2020-06-22 NOTE — PROGRESS NOTES
Chief Complaint   Patient presents with   • Apnea     Last Seen 1/29/2020       HPI:  John Wills is a 40 y.o. year old male here today for follow-up on KATHY; first compliance check.  Last OV 1/29/2020.    Hx of hypertension and DMII. BMI 41.     PSG split night 1/7/2020 indicated:  Very severe KATHY, /h associated with desaturations to 63% SPO2.  Incomplete CPAP titration.     CPAP/BIPAP Titration study 1/20/2020 indicated:  Impression:  1.  Obstructive sleep apnea  2. Treatment emergent central apnea     Recommendations:  I recommend CPAP 9 cm with dreamwear FFM.      The lowest best tolerated pressure was CPAP 9 cm.At this final pressure the patient was observed in short supine REM sleep stage. The apnea hypopnea index improved to 5.2 per hour and O2 layton 84%. The average O2 stauration was 93%. He spent 4 min of sleep time below 89% O2 saturation. Snoring was resolved.  The patient utilized dreamwear FFM with heated humidification. The CPAP was well-tolerated and there were minimal air leaks. No supplemental oxygen was required.     Compliance report using auto CPAP 9 to 15 cm nightly 5/23/2020 through 6/21/2020 indicates 80% compliance, average nightly use 6 hours 33 minutes, minimal mask leak with reduced AHI of 3.7/h. Median pressure 12.8cm.  I reviewed finds with patient. He tolerates mask and pressure well. He notes only needing 6-7hrs and feeling rested. He denies AM headaches. He tolerates mask and pressure. Energy levels are consistent and not lethargic in the evenings of dozing off. He notes co-workers/family to see change.   He denies cardiac or respiratory symptoms.    ROS: As per HPI and otherwise negative if not stated.    Past Medical History:   Diagnosis Date   • Allergy    • Apnea, sleep    • Back pain    • CHF (congestive heart failure) (HCC)    • Chickenpox    • Constipation    • Cough    • Daytime sleepiness    • Diarrhea    • Difficulty breathing    • Dizziness    •  Elevated troponin I level 2019   • Fatigue    • Frequent headaches    • Frequent urination    • Gasping for breath    • GERD (gastroesophageal reflux disease)    • Hearing difficulty    • Hypertension    • Morning headache    • Obesity    • Painful joint    • Rhinitis    • Shortness of breath    • Sleep apnea    • Snoring    • Sore muscles    • Sweat, sweating, excessive    • Swelling of lower extremity    • Uncontrolled type 2 diabetes mellitus (HCC) 2019   • Weakness    • Wheezing        Past Surgical History:   Procedure Laterality Date   • TYMPANOMASTOIDECTOMY Left        Family History   Problem Relation Age of Onset   • Hypertension Maternal Grandmother    • Sleep Apnea Father    • Hypertension Father    • Other Sister         gastroparesis       Social History     Socioeconomic History   • Marital status:      Spouse name: Not on file   • Number of children: Not on file   • Years of education: Not on file   • Highest education level: Not on file   Occupational History   • Not on file   Social Needs   • Financial resource strain: Not on file   • Food insecurity     Worry: Not on file     Inability: Not on file   • Transportation needs     Medical: Not on file     Non-medical: Not on file   Tobacco Use   • Smoking status: Current Every Day Smoker     Packs/day: 0.50     Years: 10.00     Pack years: 5.00     Types: Cigarettes     Start date: 3/16/1996     Last attempt to quit: 2008     Years since quittin.5   • Smokeless tobacco: Never Used   • Tobacco comment: Quit full packs in . Smokes 1 cigarette per day currently   Substance and Sexual Activity   • Alcohol use: Never   • Drug use: Never   • Sexual activity: Not on file   Lifestyle   • Physical activity     Days per week: Not on file     Minutes per session: Not on file   • Stress: Not on file   Relationships   • Social connections     Talks on phone: Not on file     Gets together: Not on file     Attends [employer]  "service: Not on file     Active member of club or organization: Not on file     Attends meetings of clubs or organizations: Not on file     Relationship status: Not on file   • Intimate partner violence     Fear of current or ex partner: Not on file     Emotionally abused: Not on file     Physically abused: Not on file     Forced sexual activity: Not on file   Other Topics Concern   • Not on file   Social History Narrative   • Not on file       Allergies as of 06/22/2020 - Reviewed 06/22/2020   Allergen Reaction Noted   • Sulfa drugs Rash 07/17/2015        Vitals:  /66 (BP Location: Left arm, Patient Position: Sitting, BP Cuff Size: Adult)   Pulse 67   Resp 16   Ht 1.651 m (5' 5\")   Wt 109.8 kg (242 lb)   SpO2 97%     Current medications as of today   Current Outpatient Medications   Medication Sig Dispense Refill   • metFORMIN ER (GLUCOPHAGE XR) 500 MG TABLET SR 24 HR Take 1 Tab by mouth every day. 90 Tab 3   • lisinopril (PRINIVIL) 5 MG Tab Take 1 Tab by mouth every day. 90 Tab 1   • spironolactone (ALDACTONE) 25 MG Tab Take 0.5 Tabs by mouth every day. 90 Tab 0   • carvedilol (COREG) 25 MG Tab Take 2 Tabs by mouth 2 times a day, with meals. 360 Tab 3   • atorvastatin (LIPITOR) 20 MG Tab Take 1 Tab by mouth every day. 90 Tab 3   • [employer] LANCETS Misc 1 Each by Does not apply route every day. 100 Each 3   • Blood Glucose Test Strips Test strips order: Test strips for [employer] brand meter. Sig: use every morning before breakfast and prn ssx high or low sugar 100 Strip 3     No current facility-administered medications for this visit.          Physical Exam:   Gen:           Alert and oriented, No apparent distress. Mood and affect appropriate, normal interaction with examiner.  Eyes:          PERRL, EOM intact, sclere white, conjunctive moist.  Ears:          Not examined.   Hearing:     Grossly intact.  Nose:          Normal, no lesions or deformities.  Dentition:    Good dentition.  Oropharynx: "   mask  Mallampati Classification: mask  Neck:        Supple, trachea midline, no masses.  Respiratory Effort: No intercostal retractions or use of accessory muscles.   Lung Auscultation:      Clear to auscultation bilaterally; no rales, rhonchi or wheezing.  CV:            Regular rate and rhythm. No murmurs, rubs or gallops.  Abd:           Not examined.   Lymphadenopathy: Not examined.  Gait and Station: Normal.  Digits and Nails: No clubbing, cyanosis, petechiae, or nodes.   Cranial Nerves: II-XII grossly intact.  Skin:        No rashes, lesions or ulcers noted.               Ext:           No cyanosis or edema.      Assessment:  1. Obstructive sleep apnea     2. ACC/AHA stage C systolic heart failure (HCC)     3. BMI 40.0-44.9, adult (formerly [facility])  Height And Weight   4. Current smoker         Immunizations:    Flu:recommend in fall  Pneumovax 23:not due  Prevnar 13:not due    Plan:  1.  KATHY is clinically stable and patient has responded significantly well to therapy.  He will continue CPAP nightly.  DME mask/supplies.  2.  Discuss sleep hygiene.  3.  Encouraged further weight loss through diet and exercise.  4.  Follow-up with cardiology and primary care scheduled.  5.  Follow-up in 1 year compliance report, sooner    Please note that this dictation was created using voice recognition software. I have made every reasonable attempt to correct obvious errors, but it is possible there are errors of grammar and possibly content that I did not discover before finalizing the note.

## 2020-08-12 ENCOUNTER — OFFICE VISIT (OUTPATIENT)
Dept: MEDICAL GROUP | Facility: PHYSICIAN GROUP | Age: 40
End: 2020-08-12
Payer: COMMERCIAL

## 2020-08-12 VITALS
OXYGEN SATURATION: 96 % | WEIGHT: 244.7 LBS | HEIGHT: 65 IN | BODY MASS INDEX: 40.77 KG/M2 | DIASTOLIC BLOOD PRESSURE: 80 MMHG | HEART RATE: 85 BPM | SYSTOLIC BLOOD PRESSURE: 136 MMHG | TEMPERATURE: 98.1 F

## 2020-08-12 DIAGNOSIS — I10 HYPERTENSION, UNSPECIFIED TYPE: ICD-10-CM

## 2020-08-12 DIAGNOSIS — E11.8 TYPE 2 DIABETES MELLITUS WITH COMPLICATION, WITHOUT LONG-TERM CURRENT USE OF INSULIN (HCC): ICD-10-CM

## 2020-08-12 DIAGNOSIS — N18.30 STAGE 3 CHRONIC KIDNEY DISEASE: ICD-10-CM

## 2020-08-12 PROCEDURE — 99214 OFFICE O/P EST MOD 30 MIN: CPT | Performed by: PHYSICIAN ASSISTANT

## 2020-08-12 ASSESSMENT — FIBROSIS 4 INDEX: FIB4 SCORE: 1.02

## 2020-08-12 NOTE — PROGRESS NOTES
Subjective:   John Wills is a 40 y.o. male here today for follow-up on DM2, CKD. Is an established patient of mine.    HPI:    Patient presents to the office today for routine follow-up on the above.  Last visit in the office was in May.  At that time, his metformin was decreased to only 1 500 mg tablet daily.  Was previously taking twice daily but his most recent A1c back in February was only 5.3.  He states his current fasting blood sugar readings are between 90 and low 100s. Has been continuing to follow diabetic diet. States he's also been walking regularly in the afternoons when he gets off work. He was also started on low-dose lisinopril (5 mg daily) at the last appointment which he states he has been tolerating well.  Denies cough or other side effects.    His renal function has been checked regularly since February due to acute worsening at that time.  He was taken off Lasix and his dose of spironolactone decreased.  Has had improvement in his renal function as of late.  Most recent GFR in May was 53.    In addition to the lisinopril and spironolactone, he also takes carvedilol, the dose of which was increased at his last cardiology appointment to 50 mg twice daily.  Blood pressure today in the office is 136/80.  He states it runs lower than this at home as he checks regularly.  States that typical reading for him is between 1 20-1 25 systolic over 80-83 diastolic.      Current medicines (including changes today)  Current Outpatient Medications   Medication Sig Dispense Refill   • metFORMIN ER (GLUCOPHAGE XR) 500 MG TABLET SR 24 HR Take 1 Tab by mouth every day. 90 Tab 3   • lisinopril (PRINIVIL) 5 MG Tab Take 1 Tab by mouth every day. 90 Tab 1   • spironolactone (ALDACTONE) 25 MG Tab Take 0.5 Tabs by mouth every day. 90 Tab 0   • carvedilol (COREG) 25 MG Tab Take 2 Tabs by mouth 2 times a day, with meals. 360 Tab 3   • atorvastatin (LIPITOR) 20 MG Tab Take 1 Tab by mouth every day. 90  "Tab 3   • [employer] LANCETS Misc 1 Each by Does not apply route every day. 100 Each 3   • Blood Glucose Test Strips Test strips order: Test strips for [employer] brand meter. Sig: use every morning before breakfast and prn ssx high or low sugar 100 Strip 3     No current facility-administered medications for this visit.      He  has a past medical history of Allergy, Apnea, sleep, Back pain, CHF (congestive heart failure) ([facility]), Chickenpox, Constipation, Cough, Daytime sleepiness, Diarrhea, Difficulty breathing, Dizziness, Elevated troponin I level (8/20/2019), Fatigue, Frequent headaches, Frequent urination, Gasping for breath, GERD (gastroesophageal reflux disease), Hearing difficulty, Hypertension, Morning headache, Obesity, Painful joint, Rhinitis, Shortness of breath, Sleep apnea, Snoring, Sore muscles, Sweat, sweating, excessive, Swelling of lower extremity, Uncontrolled type 2 diabetes mellitus (HCC) (8/23/2019), Weakness, and Wheezing. He also has no past medical history of Asthma.    ROS  No chest pain, edema  No shortness of breath       Objective:     /80 (BP Location: Right arm, Patient Position: Sitting, BP Cuff Size: Adult)   Pulse 85   Temp 36.7 °C (98.1 °F) (Temporal)   Ht 1.651 m (5' 5\")   Wt 111 kg (244 lb 11.2 oz)   SpO2 96%  Body mass index is 40.72 kg/m².     Physical Exam:  Constitutional: Alert, obese but otherwise well-appearing, very pleasant, no distress.  Skin: No rashes in visible areas.  Eye: Pupils are equal and round, conjunctiva clear, lids normal.  ENMT: Lips without lesions, moist mucus membranes.  Respiratory: Unlabored respiratory effort, lungs clear to auscultation, no wheezes, no rhonchi.  Cardiovascular: Normal S1, S2, no murmur, no lower extremity edema.      Assessment and Plan:   The following treatment plan was discussed    1. Type 2 diabetes mellitus with complication, without long-term current use of insulin (HCC)  Established problem, suspect well-controlled " given his home blood sugar readings.  I have ordered repeat A1c along with other necessary screening lab work.  Advised to continue 500 mg of metformin daily and maintain diabetic diet.  - HEMOGLOBIN A1C; Future  - Basic Metabolic Panel; Future    2. Stage 3 chronic kidney disease (HCC)  Established problem, improving per review.  In need of current renal function panel and CBC which have been ordered.  - Basic Metabolic Panel; Future  - CBC WITHOUT DIFFERENTIAL; Future    3. Hypertension, unspecified type  Established problem, well-controlled per review of home readings.  He will continue current medications and continue to follow with cardiology.  - Basic Metabolic Panel; Future      Followup: Return for establish care with new PCP.    Yakelin Ramey P.A.-C.

## 2020-10-07 DIAGNOSIS — I50.20 ACC/AHA STAGE C SYSTOLIC HEART FAILURE (HCC): ICD-10-CM

## 2020-10-07 DIAGNOSIS — I51.89 LEFT VENTRICULAR SYSTOLIC DYSFUNCTION, NYHA CLASS 2: ICD-10-CM

## 2020-10-08 ENCOUNTER — HOSPITAL ENCOUNTER (OUTPATIENT)
Dept: LAB | Facility: MEDICAL CENTER | Age: 40
End: 2020-10-08
Attending: PHYSICIAN ASSISTANT
Payer: COMMERCIAL

## 2020-10-08 DIAGNOSIS — N18.30 STAGE 3 CHRONIC KIDNEY DISEASE: ICD-10-CM

## 2020-10-08 DIAGNOSIS — E11.8 TYPE 2 DIABETES MELLITUS WITH COMPLICATION, WITHOUT LONG-TERM CURRENT USE OF INSULIN (HCC): ICD-10-CM

## 2020-10-08 DIAGNOSIS — I10 HYPERTENSION, UNSPECIFIED TYPE: ICD-10-CM

## 2020-10-08 LAB
ANION GAP SERPL CALC-SCNC: 11 MMOL/L (ref 7–16)
BUN SERPL-MCNC: 36 MG/DL (ref 8–22)
CALCIUM SERPL-MCNC: 9.9 MG/DL (ref 8.5–10.5)
CHLORIDE SERPL-SCNC: 103 MMOL/L (ref 96–112)
CO2 SERPL-SCNC: 24 MMOL/L (ref 20–33)
CREAT SERPL-MCNC: 1.46 MG/DL (ref 0.5–1.4)
ERYTHROCYTE [DISTWIDTH] IN BLOOD BY AUTOMATED COUNT: 43.8 FL (ref 35.9–50)
EST. AVERAGE GLUCOSE BLD GHB EST-MCNC: 117 MG/DL
GLUCOSE SERPL-MCNC: 111 MG/DL (ref 65–99)
HBA1C MFR BLD: 5.7 % (ref 0–5.6)
HCT VFR BLD AUTO: 48.9 % (ref 42–52)
HGB BLD-MCNC: 15.7 G/DL (ref 14–18)
MCH RBC QN AUTO: 30.3 PG (ref 27–33)
MCHC RBC AUTO-ENTMCNC: 32.1 G/DL (ref 33.7–35.3)
MCV RBC AUTO: 94.2 FL (ref 81.4–97.8)
PLATELET # BLD AUTO: 189 K/UL (ref 164–446)
PMV BLD AUTO: 10.9 FL (ref 9–12.9)
POTASSIUM SERPL-SCNC: 4.8 MMOL/L (ref 3.6–5.5)
RBC # BLD AUTO: 5.19 M/UL (ref 4.7–6.1)
SODIUM SERPL-SCNC: 138 MMOL/L (ref 135–145)
WBC # BLD AUTO: 6.9 K/UL (ref 4.8–10.8)

## 2020-10-08 PROCEDURE — 85027 COMPLETE CBC AUTOMATED: CPT

## 2020-10-08 PROCEDURE — 36415 COLL VENOUS BLD VENIPUNCTURE: CPT

## 2020-10-08 PROCEDURE — 80048 BASIC METABOLIC PNL TOTAL CA: CPT

## 2020-10-08 PROCEDURE — 83036 HEMOGLOBIN GLYCOSYLATED A1C: CPT

## 2020-10-08 RX ORDER — SPIRONOLACTONE 25 MG/1
TABLET ORAL
Qty: 90 TAB | Refills: 0 | Status: SHIPPED | OUTPATIENT
Start: 2020-10-08 | End: 2021-04-12

## 2020-10-11 SDOH — ECONOMIC STABILITY: HOUSING INSECURITY: IN THE LAST 12 MONTHS, HOW MANY PLACES HAVE YOU LIVED?: 1

## 2020-10-11 SDOH — HEALTH STABILITY: PHYSICAL HEALTH: ON AVERAGE, HOW MANY MINUTES DO YOU ENGAGE IN EXERCISE AT THIS LEVEL?: 60 MINUTES

## 2020-10-11 SDOH — ECONOMIC STABILITY: HOUSING INSECURITY
IN THE LAST 12 MONTHS, WAS THERE A TIME WHEN YOU DID NOT HAVE A STEADY PLACE TO SLEEP OR SLEPT IN A SHELTER (INCLUDING NOW)?: PATIENT REFUSED

## 2020-10-11 SDOH — HEALTH STABILITY: PHYSICAL HEALTH: ON AVERAGE, HOW MANY DAYS PER WEEK DO YOU ENGAGE IN MODERATE TO STRENUOUS EXERCISE (LIKE A BRISK WALK)?: 5 DAYS

## 2020-10-11 SDOH — ECONOMIC STABILITY: INCOME INSECURITY: IN THE LAST 12 MONTHS, WAS THERE A TIME WHEN YOU WERE NOT ABLE TO PAY THE MORTGAGE OR RENT ON TIME?: PATIENT REFUSED

## 2020-10-11 SDOH — ECONOMIC STABILITY: TRANSPORTATION INSECURITY
IN THE PAST 12 MONTHS, HAS LACK OF RELIABLE TRANSPORTATION KEPT YOU FROM MEDICAL APPOINTMENTS, MEETINGS, WORK OR FROM GETTING THINGS NEEDED FOR DAILY LIVING?: NO

## 2020-10-11 SDOH — ECONOMIC STABILITY: TRANSPORTATION INSECURITY
IN THE PAST 12 MONTHS, HAS THE LACK OF TRANSPORTATION KEPT YOU FROM MEDICAL APPOINTMENTS OR FROM GETTING MEDICATIONS?: NO

## 2020-10-11 SDOH — HEALTH STABILITY: MENTAL HEALTH
STRESS IS WHEN SOMEONE FEELS TENSE, NERVOUS, ANXIOUS, OR CAN'T SLEEP AT NIGHT BECAUSE THEIR MIND IS TROUBLED. HOW STRESSED ARE YOU?: ONLY A LITTLE

## 2020-10-11 ASSESSMENT — SOCIAL DETERMINANTS OF HEALTH (SDOH)
HOW HARD IS IT FOR YOU TO PAY FOR THE VERY BASICS LIKE FOOD, HOUSING, MEDICAL CARE, AND HEATING?: DECLINE
HOW OFTEN DO YOU ATTENT MEETINGS OF THE CLUB OR ORGANIZATION YOU BELONG TO?: NEVER
HOW OFTEN DO YOU HAVE A DRINK CONTAINING ALCOHOL: NEVER
HOW OFTEN DO YOU ATTEND CHURCH OR RELIGIOUS SERVICES?: NEVER
DO YOU BELONG TO ANY CLUBS OR ORGANIZATIONS SUCH AS CHURCH GROUPS UNIONS, FRATERNAL OR ATHLETIC GROUPS, OR SCHOOL GROUPS?: NO
HOW OFTEN DO YOU GET TOGETHER WITH FRIENDS OR RELATIVES?: MORE THAN THREE TIMES A WEEK
WITHIN THE PAST 12 MONTHS, YOU WORRIED THAT YOUR FOOD WOULD RUN OUT BEFORE YOU GOT THE MONEY TO BUY MORE: DECLINE
IN A TYPICAL WEEK, HOW MANY TIMES DO YOU TALK ON THE PHONE WITH FAMILY, FRIENDS, OR NEIGHBORS?: MORE THAN THREE TIMES A WEEK
HOW OFTEN DO YOU HAVE SIX OR MORE DRINKS ON ONE OCCASION: NEVER
WITHIN THE PAST 12 MONTHS, THE FOOD YOU BOUGHT JUST DIDN'T LAST AND YOU DIDN'T HAVE MONEY TO GET MORE: DECLINE
HOW MANY DRINKS CONTAINING ALCOHOL DO YOU HAVE ON A TYPICAL DAY WHEN YOU ARE DRINKING: DECLINE

## 2020-10-12 ENCOUNTER — OFFICE VISIT (OUTPATIENT)
Dept: MEDICAL GROUP | Facility: PHYSICIAN GROUP | Age: 40
End: 2020-10-12
Payer: COMMERCIAL

## 2020-10-12 VITALS
OXYGEN SATURATION: 96 % | HEIGHT: 65 IN | DIASTOLIC BLOOD PRESSURE: 100 MMHG | SYSTOLIC BLOOD PRESSURE: 146 MMHG | HEART RATE: 72 BPM | BODY MASS INDEX: 40.48 KG/M2 | WEIGHT: 243 LBS | TEMPERATURE: 99.2 F

## 2020-10-12 DIAGNOSIS — E78.5 DYSLIPIDEMIA ASSOCIATED WITH TYPE 2 DIABETES MELLITUS (HCC): ICD-10-CM

## 2020-10-12 DIAGNOSIS — E11.8 TYPE 2 DIABETES MELLITUS WITH COMPLICATION, WITHOUT LONG-TERM CURRENT USE OF INSULIN (HCC): ICD-10-CM

## 2020-10-12 DIAGNOSIS — I10 HYPERTENSION, UNSPECIFIED TYPE: ICD-10-CM

## 2020-10-12 DIAGNOSIS — E11.69 DYSLIPIDEMIA ASSOCIATED WITH TYPE 2 DIABETES MELLITUS (HCC): ICD-10-CM

## 2020-10-12 DIAGNOSIS — N18.31 STAGE 3A CHRONIC KIDNEY DISEASE: ICD-10-CM

## 2020-10-12 DIAGNOSIS — I50.20 ACC/AHA STAGE C SYSTOLIC HEART FAILURE (HCC): ICD-10-CM

## 2020-10-12 PROCEDURE — 99214 OFFICE O/P EST MOD 30 MIN: CPT | Performed by: NURSE PRACTITIONER

## 2020-10-12 RX ORDER — LISINOPRIL 5 MG/1
5 TABLET ORAL DAILY
Qty: 90 TAB | Refills: 1 | Status: SHIPPED | OUTPATIENT
Start: 2020-10-12 | End: 2021-03-16 | Stop reason: SDUPTHER

## 2020-10-12 ASSESSMENT — FIBROSIS 4 INDEX: FIB4 SCORE: 0.92

## 2020-10-12 NOTE — PROGRESS NOTES
Chief Complaint   Patient presents with   • Establish Care   • Medication Refill     Lisinopril         Subjective:     John Wills is a 40 y.o. male presenting to establish care.    Diabetes: Patient taking 500 mg extended release Metformin daily.  Dose was decreased after decline in GFR earlier 2020.  A1c checked in early October, stable at 5.7.    Heart failure: Chronic, controlled.  Follows with [facility] cardiology.  Overdue for follow-up appointment.  Attributed to hypertension, obesity, sleep apnea.  Tolerating carvedilol and spironolactone well.    Hypertension: Chronic, uncontrolled.  Notably hypertensive today at 146/100.  Patient attributes this to coming from work and being out of his lisinopril for several days.  He checks his blood pressure at home and reports that usually 130s over 80s in the morning.  Denies chest pain, visual changes, headache.  Does have upcoming appointment for glaucoma evaluation with ophthalmology.    Dyslipidemia: Chronic, uncontrolled.  Currently taking 20 mg atorvastatin, tolerating well without significant myalgias or side effects.  Last lipid panel done February 2020 did show LDL within goal but elevated triglycerides at 193.    KATHY: Established with [facility] pulmonology and sleep center.  Severe obstructive sleep apnea with significant desaturations.  Fortunately fitted with a CPAP and tolerating quite well.  Reports improved energy that is noticeable not only to himself that his family and coworkers during the day.      Review of systems:      Denies chest pain, shortness of breath, sore throat, difficulty swallowing, new cough, dizziness, severe headache, altered cognition, changes in bowel or bladder habits, decreased sensation, decreased strength, numbness or tingling, intolerable depression or anxiety, rash or skin concerns, changes in vision, fatigue, myalgias, painful or swollen lymph nodes.       Current Outpatient Medications:   •  lisinopril  "(PRINIVIL) 5 MG Tab, Take 1 Tab by mouth every day., Disp: 90 Tab, Rfl: 1  •  spironolactone (ALDACTONE) 25 MG Tab, TAKE 1/2 TABLET BY MOUTH ONCE DAILY, Disp: 90 Tab, Rfl: 0  •  metFORMIN ER (GLUCOPHAGE XR) 500 MG TABLET SR 24 HR, Take 1 Tab by mouth every day., Disp: 90 Tab, Rfl: 3  •  carvedilol (COREG) 25 MG Tab, Take 2 Tabs by mouth 2 times a day, with meals., Disp: 360 Tab, Rfl: 3  •  atorvastatin (LIPITOR) 20 MG Tab, Take 1 Tab by mouth every day., Disp: 90 Tab, Rfl: 3  •  [employer] LANCETS Misc, 1 Each by Does not apply route every day., Disp: 100 Each, Rfl: 3  •  Blood Glucose Test Strips, Test strips order: Test strips for [employer] brand meter. Sig: use every morning before breakfast and prn ssx high or low sugar, Disp: 100 Strip, Rfl: 3    Allergies, past medical history, past surgical history, family history, social history reviewed and updated    Objective:     Vitals: /100 (BP Location: Right arm, Patient Position: Sitting, BP Cuff Size: Adult)   Pulse 72   Temp 37.3 °C (99.2 °F) (Temporal)   Ht 1.651 m (5' 5\")   Wt 110.2 kg (243 lb)   SpO2 96%   BMI 40.44 kg/m²   General: Alert, cooperative, dressed appropriately for weather / situation  Eyes:Normocephalic.  EOMI, no icterus or pallor.  Conjunctivae clear without erythema / irritation.  ENT:  External ears developed; Bilat TMs visualized; appear pearly without bulging, effusion, or erythema; crisp light reflex.     Lymph: Neck supple, absent of cervical or supraclavicular lymphadenopathy.  Thyroid palpated, free of masses or goiter.   Heart: Regular rate and rhythm.  S1 and S2 normal.  No murmurs auscultated; no murmurs / bruits heard over bilateral carotids.  Bilateral radial pulses strong and equal.  Bilateral posterior tibial pulses strong and equal.  Respiratory: Normal respiratory effort.  Clear to auscultation bilaterally.  AP ratio 1:2   Abdomen: Non-distended;   Skin: Visible skin intact, dry, without rash.  Musculoskeletal: Gait " is normal.  Bilateral  strength strong equal.  Moves extremities freely and equally bilaterally  Neuro:  AAOx3 Visual tracking intact, no nystagmus;   Psych:  Affect/mood is normal, judgement is good, memory is intact, grooming is appropriate.    Assessment/Plan:     John was seen today for establish care and medication refill.    Diagnoses and all orders for this visit:    Requested patient restart his antihypertensive regimen diligently then return to clinic in 2 weeks for medical assistant visit blood pressure check.     He is overdue for follow-up with cardiology.  I recommended he follow-up with him.    Regarding kidney disease, will continue to monitor progress and refer to nephrology if needed.  Fortunately it is stable after medication changes earlier this year.      Hypertension, unspecified type  -     lisinopril (PRINIVIL) 5 MG Tab; Take 1 Tab by mouth every day.    Type 2 diabetes mellitus with complication, without long-term current use of insulin (HCC)  -     lisinopril (PRINIVIL) 5 MG Tab; Take 1 Tab by mouth every day.    Stage 3a chronic kidney disease    Dyslipidemia associated with type 2 diabetes mellitus (HCC)    ACC/AHA stage C systolic heart failure (HCC)          Return in about 6 months (around 4/12/2021).    Patient verbalized understanding and agreed to plan of care.  Encouraged to contact me with needs via [employer] or by phone if needed.      I have placed the above orders and discussed them with an approved delegating provider.  The MA is performing the below orders under the direction of Dr Garza.    Please note that this dictation was created using voice recognition software. I have made every reasonable attempt to correct obvious errors, but I expect that there are errors of grammar and possibly content that I did not discover before finalizing the note.

## 2020-10-26 ENCOUNTER — NON-PROVIDER VISIT (OUTPATIENT)
Dept: MEDICAL GROUP | Facility: PHYSICIAN GROUP | Age: 40
End: 2020-10-26
Payer: COMMERCIAL

## 2020-10-26 VITALS — SYSTOLIC BLOOD PRESSURE: 114 MMHG | DIASTOLIC BLOOD PRESSURE: 80 MMHG

## 2020-10-26 PROCEDURE — 99999 PR NO CHARGE: CPT | Performed by: NURSE PRACTITIONER

## 2020-10-26 NOTE — PROGRESS NOTES
John Wills is a 40 y.o. male here for a non-provider visit for BP Check    Vitals:    10/26/20 1623   BP: 114/80   BP Location: Right arm   Patient Position: Sitting   BP Cuff Size: Large adult     If abnormal, was an in office provider notified today? Not Indicated  Routed to PCP? Yes

## 2020-11-12 DIAGNOSIS — E11.69 DYSLIPIDEMIA ASSOCIATED WITH TYPE 2 DIABETES MELLITUS (HCC): ICD-10-CM

## 2020-11-12 DIAGNOSIS — E78.5 DYSLIPIDEMIA ASSOCIATED WITH TYPE 2 DIABETES MELLITUS (HCC): ICD-10-CM

## 2020-11-13 RX ORDER — ATORVASTATIN CALCIUM 20 MG/1
TABLET, FILM COATED ORAL
Qty: 90 TAB | Refills: 0 | Status: SHIPPED | OUTPATIENT
Start: 2020-11-13 | End: 2021-02-22 | Stop reason: SDUPTHER

## 2020-11-14 NOTE — TELEPHONE ENCOUNTER
Last seen by PCP 10/12/2020.  Lab Results   Component Value Date/Time    CHOLSTRLTOT 116 02/12/2020 10:33 AM    LDL 47 02/12/2020 10:33 AM    HDL 30 (A) 02/12/2020 10:33 AM    TRIGLYCERIDE 193 (H) 02/12/2020 10:33 AM       Lab Results   Component Value Date/Time    SODIUM 138 10/08/2020 06:26 AM    POTASSIUM 4.8 10/08/2020 06:26 AM    CHLORIDE 103 10/08/2020 06:26 AM    CO2 24 10/08/2020 06:26 AM    GLUCOSE 111 (H) 10/08/2020 06:26 AM    BUN 36 (H) 10/08/2020 06:26 AM    CREATININE 1.46 (H) 10/08/2020 06:26 AM     Lab Results   Component Value Date/Time    ALKPHOSPHAT 58 02/12/2020 10:33 AM    ASTSGOT 20 02/12/2020 10:33 AM    ALTSGPT 21 02/12/2020 10:33 AM    TBILIRUBIN 0.5 02/12/2020 10:33 AM       Will send 3 month(s) to the pharmacy.

## 2021-02-22 DIAGNOSIS — E78.5 DYSLIPIDEMIA ASSOCIATED WITH TYPE 2 DIABETES MELLITUS (HCC): ICD-10-CM

## 2021-02-22 DIAGNOSIS — E11.69 DYSLIPIDEMIA ASSOCIATED WITH TYPE 2 DIABETES MELLITUS (HCC): ICD-10-CM

## 2021-02-22 RX ORDER — ATORVASTATIN CALCIUM 20 MG/1
20 TABLET, FILM COATED ORAL
Qty: 90 TABLET | Refills: 1 | Status: SHIPPED | OUTPATIENT
Start: 2021-02-22 | End: 2021-08-25 | Stop reason: SDUPTHER

## 2021-02-22 NOTE — TELEPHONE ENCOUNTER
Pt has had OV within the 12 month protocol and lipid panel is current. 6 month supply sent to pharmacy.   Lab Results   Component Value Date/Time    CHOLSTRLTOT 116 02/12/2020 10:33 AM    LDL 47 02/12/2020 10:33 AM    HDL 30 (A) 02/12/2020 10:33 AM    TRIGLYCERIDE 193 (H) 02/12/2020 10:33 AM       Lab Results   Component Value Date/Time    SODIUM 138 10/08/2020 06:26 AM    POTASSIUM 4.8 10/08/2020 06:26 AM    CHLORIDE 103 10/08/2020 06:26 AM    CO2 24 10/08/2020 06:26 AM    GLUCOSE 111 (H) 10/08/2020 06:26 AM    BUN 36 (H) 10/08/2020 06:26 AM    CREATININE 1.46 (H) 10/08/2020 06:26 AM     Lab Results   Component Value Date/Time    ALKPHOSPHAT 58 02/12/2020 10:33 AM    ASTSGOT 20 02/12/2020 10:33 AM    ALTSGPT 21 02/12/2020 10:33 AM    TBILIRUBIN 0.5 02/12/2020 10:33 AM

## 2021-03-14 DIAGNOSIS — I10 HYPERTENSION, UNSPECIFIED TYPE: ICD-10-CM

## 2021-03-14 DIAGNOSIS — E11.8 TYPE 2 DIABETES MELLITUS WITH COMPLICATION, WITHOUT LONG-TERM CURRENT USE OF INSULIN (HCC): ICD-10-CM

## 2021-03-14 RX ORDER — LISINOPRIL 5 MG/1
5 TABLET ORAL DAILY
Qty: 90 TABLET | Refills: 1 | Status: CANCELLED | OUTPATIENT
Start: 2021-03-14

## 2021-03-16 DIAGNOSIS — I10 HYPERTENSION, UNSPECIFIED TYPE: ICD-10-CM

## 2021-03-16 DIAGNOSIS — E11.8 TYPE 2 DIABETES MELLITUS WITH COMPLICATION, WITHOUT LONG-TERM CURRENT USE OF INSULIN (HCC): ICD-10-CM

## 2021-03-16 RX ORDER — LISINOPRIL 5 MG/1
5 TABLET ORAL DAILY
Qty: 30 TABLET | Refills: 0 | Status: SHIPPED | OUTPATIENT
Start: 2021-03-16 | End: 2021-09-09

## 2021-03-16 RX ORDER — CARVEDILOL 25 MG/1
50 TABLET ORAL 2 TIMES DAILY WITH MEALS
Qty: 120 TABLET | Refills: 0 | Status: SHIPPED | OUTPATIENT
Start: 2021-03-16 | End: 2021-04-16

## 2021-04-05 ENCOUNTER — OFFICE VISIT (OUTPATIENT)
Dept: CARDIOLOGY | Facility: MEDICAL CENTER | Age: 41
End: 2021-04-05
Payer: COMMERCIAL

## 2021-04-05 VITALS
OXYGEN SATURATION: 96 % | DIASTOLIC BLOOD PRESSURE: 80 MMHG | SYSTOLIC BLOOD PRESSURE: 120 MMHG | WEIGHT: 256.8 LBS | HEART RATE: 69 BPM | RESPIRATION RATE: 18 BRPM | BODY MASS INDEX: 42.78 KG/M2 | HEIGHT: 65 IN

## 2021-04-05 DIAGNOSIS — I50.9 HEART FAILURE, NYHA CLASS 1 (HCC): ICD-10-CM

## 2021-04-05 DIAGNOSIS — E11.69 DYSLIPIDEMIA ASSOCIATED WITH TYPE 2 DIABETES MELLITUS (HCC): ICD-10-CM

## 2021-04-05 DIAGNOSIS — Z79.899 HIGH RISK MEDICATION USE: ICD-10-CM

## 2021-04-05 DIAGNOSIS — G47.33 OBSTRUCTIVE SLEEP APNEA SYNDROME: ICD-10-CM

## 2021-04-05 DIAGNOSIS — I50.20 ACC/AHA STAGE C SYSTOLIC HEART FAILURE (HCC): ICD-10-CM

## 2021-04-05 DIAGNOSIS — E78.5 DYSLIPIDEMIA ASSOCIATED WITH TYPE 2 DIABETES MELLITUS (HCC): ICD-10-CM

## 2021-04-05 DIAGNOSIS — E11.8 TYPE 2 DIABETES MELLITUS WITH COMPLICATION, WITHOUT LONG-TERM CURRENT USE OF INSULIN (HCC): ICD-10-CM

## 2021-04-05 DIAGNOSIS — I10 HTN (HYPERTENSION), MALIGNANT: ICD-10-CM

## 2021-04-05 PROCEDURE — 99214 OFFICE O/P EST MOD 30 MIN: CPT | Performed by: NURSE PRACTITIONER

## 2021-04-05 ASSESSMENT — MINNESOTA LIVING WITH HEART FAILURE QUESTIONNAIRE (MLHF)
MAKING YOU FEEL DEPRESSED: 0
DIFFICULTY SOCIALIZING WITH FAMILY OR FRIENDS: 0
GIVING YOU SIDE EFFECTS FROM TREATMENTS: 0
MAKING YOU SHORT OF BREATH: 0
HAVING TO SIT OR LIE DOWN DURING THE DAY: 0
DIFFICULTY TO CONCENTRATE OR REMEMBERING THINGS: 0
LOSS OF SELF CONTROL IN YOUR LIFE: 0
FEELING LIKE A BURDEN TO FAMILY AND FRIENDS: 0
MAKING YOU WORRY: 0
DIFFICULTY WITH RECREATIONAL PASTIMES, SPORTS, HOBBIES: 0
SWELLING IN ANKLES OR LEGS: 0
TOTAL_SCORE: 3
WALKING ABOUT OR CLIMBING STAIRS DIFFICULT: 0
EATING LESS FOODS YOU LIKE: 3
TIRED, FATIGUED OR LOW ON ENERGY: 0
WORKING AROUND THE HOUSE OR YARD DIFFICULT: 0
DIFFICULTY WORKING TO EARN A LIVING: 0
DIFFICULTY WITH SEXUAL ACTIVITIES: 0
MAKING YOU STAY IN A HOSPITAL: 0
DIFFICULTY GOING AWAY FROM HOME: 0
DIFFICULTY SLEEPING WELL AT NIGHT: 0
COSTING YOU MONEY FOR MEDICAL CARE: 0

## 2021-04-05 ASSESSMENT — ENCOUNTER SYMPTOMS
PALPITATIONS: 0
PND: 0
ABDOMINAL PAIN: 0
COUGH: 0
ORTHOPNEA: 0
DIZZINESS: 0
SHORTNESS OF BREATH: 0
MYALGIAS: 0
CLAUDICATION: 0
FEVER: 0

## 2021-04-05 ASSESSMENT — FIBROSIS 4 INDEX: FIB4 SCORE: 0.95

## 2021-04-05 NOTE — PROGRESS NOTES
Chief Complaint   Patient presents with   • CHF (Systolic)     F/V Dx: ACC/AHA stage C systolic heart failure (HCC)       Subjective:   John Wills is a 40 y.o. male who is following up on his heart failure.    Patient of the heart failure clinic.  He was last seen in clinic on   3/24/2020 through a telephone visit.  Carvedilol was increased to 50 mg twice a day.  Patient reports no problems with the dose increase    He reports no problems since his last visit.  He denies chest pain, palpitations, orthopnea, PND, edema, dizziness/lightheadedness or shortness of breath.    Home weights are around 248-251 lbs. He has gained general weight.     He did start CPAP shortly after his last visit. He is tolerating well.    Additonally, patient has the following medical problems:    -Diabetes: Controlled, taking metformin, current A1c 5.3    -Sleep apnea: Pending treatment    -Obesity    -Hypertension    -Dyslipidemia    Past Medical History:   Diagnosis Date   • Allergy    • Apnea, sleep    • Back pain    • CHF (congestive heart failure) ([facility] [place])    • Chickenpox    • Constipation    • Cough    • Daytime sleepiness    • Diarrhea    • Difficulty breathing    • Dizziness    • Elevated troponin I level 8/20/2019   • Fatigue    • Frequent headaches    • Frequent urination    • Gasping for breath    • GERD (gastroesophageal reflux disease)    • Hearing difficulty    • Hypertension    • Morning headache    • Obesity    • Painful joint    • Rhinitis    • Shortness of breath    • Sleep apnea    • Snoring    • Sore muscles    • Sweat, sweating, excessive    • Swelling of lower extremity    • Uncontrolled type 2 diabetes mellitus (HCC) 8/23/2019   • Weakness    • Wheezing      Past Surgical History:   Procedure Laterality Date   • TYMPANOMASTOIDECTOMY Left 2000     Family History   Problem Relation Age of Onset   • Hypertension Maternal Grandmother    • Sleep Apnea Father    • Hypertension Father    • Other Sister          gastroparesis     Social History     Socioeconomic History   • Marital status:      Spouse name: Not on file   • Number of children: Not on file   • Years of education: Not on file   • Highest education level: 12th grade   Occupational History   • Not on file   Tobacco Use   • Smoking status: Current Every Day Smoker     Packs/day: 0.50     Years: 10.00     Pack years: 5.00     Types: Cigarettes     Start date: 3/16/1996     Last attempt to quit: 2008     Years since quittin.3   • Smokeless tobacco: Never Used   • Tobacco comment: Quit full packs in . Smokes 1 cigarette per day currently   Substance and Sexual Activity   • Alcohol use: Never   • Drug use: Never   • Sexual activity: Not on file   Other Topics Concern   • Not on file   Social History Narrative   • Not on file     Social Determinants of Health     Financial Resource Strain:    • Difficulty of Paying Living Expenses:    Food Insecurity:    • Worried About Running Out of Food in the Last Year:    • Ran Out of Food in the Last Year:    Transportation Needs: No Transportation Needs   • Lack of Transportation (Medical): No   • Lack of Transportation (Non-Medical): No   Physical Activity: Sufficiently Active   • Days of Exercise per Week: 5 days   • Minutes of Exercise per Session: 60 min   Stress: No Stress Concern Present   • Feeling of Stress : Only a little   Social Connections: Somewhat Isolated   • Frequency of Communication with Friends and Family: More than three times a week   • Frequency of Social Gatherings with Friends and Family: More than three times a week   • Attends Rastafari Services: Never   • Active Member of Clubs or Organizations: No   • Attends Club or Organization Meetings: Never   • Marital Status:    Intimate Partner Violence:    • Fear of Current or Ex-Partner:    • Emotionally Abused:    • Physically Abused:    • Sexually Abused:      Allergies   Allergen Reactions   • Sulfa Drugs Rash     Heat rash   "    Outpatient Encounter Medications as of 4/5/2021   Medication Sig Dispense Refill   • carvedilol (COREG) 25 MG Tab Take 2 Tablets by mouth 2 times a day, with meals. (Patient taking differently: Take 50 mg by mouth 2 times a day.) 120 tablet 0   • lisinopril (PRINIVIL) 5 MG Tab Take 1 tablet by mouth every day. 30 tablet 0   • atorvastatin (LIPITOR) 20 MG Tab Take 1 tablet by mouth every day. 90 tablet 1   • spironolactone (ALDACTONE) 25 MG Tab TAKE 1/2 TABLET BY MOUTH ONCE DAILY 90 Tab 0   • metFORMIN ER (GLUCOPHAGE XR) 500 MG TABLET SR 24 HR Take 1 Tab by mouth every day. 90 Tab 3   • [employer] LANCETS Misc 1 Each by Does not apply route every day. 100 Each 3   • Blood Glucose Test Strips Test strips order: Test strips for [employer] brand meter. Sig: use every morning before breakfast and prn ssx high or low sugar 100 Strip 3     No facility-administered encounter medications on file as of 4/5/2021.     Review of Systems   Constitutional: Negative for fever and malaise/fatigue.   Respiratory: Negative for cough and shortness of breath.    Cardiovascular: Negative for chest pain, palpitations, orthopnea, claudication, leg swelling and PND.   Gastrointestinal: Negative for abdominal pain.   Musculoskeletal: Negative for myalgias.   Neurological: Negative for dizziness.   All other systems reviewed and are negative.       Objective:   /80 (BP Location: Left arm, Patient Position: Sitting, BP Cuff Size: Adult)   Pulse 69   Resp 18   Ht 1.651 m (5' 5\")   Wt 116 kg (256 lb 12.8 oz)   SpO2 96%   BMI 42.73 kg/m²     Physical Exam   Constitutional: He is oriented to person, place, and time. He appears well-developed and well-nourished.   HENT:   Head: Normocephalic and atraumatic.   Eyes: Pupils are equal, round, and reactive to light. EOM are normal.   Neck: No JVD present.   Cardiovascular: Normal rate, regular rhythm and normal heart sounds.   Pulmonary/Chest: Effort normal and breath sounds normal. No " respiratory distress. He has no wheezes. He has no rales.   Abdominal: Soft. Bowel sounds are normal.   Musculoskeletal:         General: No edema.      Cervical back: Normal range of motion and neck supple.   Neurological: He is alert and oriented to person, place, and time.   Skin: Skin is warm and dry.   Psychiatric: He has a normal mood and affect. His behavior is normal.   Vitals reviewed.    Lab Results   Component Value Date/Time    CHOLSTRLTOT 116 02/12/2020 10:33 AM    LDL 47 02/12/2020 10:33 AM    HDL 30 (A) 02/12/2020 10:33 AM    TRIGLYCERIDE 193 (H) 02/12/2020 10:33 AM       Lab Results   Component Value Date/Time    SODIUM 138 10/08/2020 06:26 AM    POTASSIUM 4.8 10/08/2020 06:26 AM    CHLORIDE 103 10/08/2020 06:26 AM    CO2 24 10/08/2020 06:26 AM    GLUCOSE 111 (H) 10/08/2020 06:26 AM    BUN 36 (H) 10/08/2020 06:26 AM    CREATININE 1.46 (H) 10/08/2020 06:26 AM     Lab Results   Component Value Date/Time    ALKPHOSPHAT 58 02/12/2020 10:33 AM    ASTSGOT 20 02/12/2020 10:33 AM    ALTSGPT 21 02/12/2020 10:33 AM    TBILIRUBIN 0.5 02/12/2020 10:33 AM      Results for KEERTHI CAMPOS (MRN [id number]) as of 2/25/2020 17:02   Ref. Range 2/12/2020 10:33   Glycohemoglobin Latest Ref Range: 0.0 - 5.6 % 5.3       Transthoracic Echo Report 8/21/2019  No prior study is available for comparison.   Normal left ventricular chamber size.  Left ventricular ejection fraction is visually estimated to be 40%.  Normal inferior vena cava size and inspiratory collapse.  Poor valvular visualization.     Transthoracic Echo Report 11/4/2019  Compared to the images of the prior study done 08/21/2019, the EF has normalized.  Moderate concentric left ventricular hypertrophy.  Left ventricular ejection fraction is visually estimated to be 55%.  Unable to estimate pulmonary artery pressure due to an inadequate tricuspid regurgitant jet.    Assessment:     1. High risk medication use  Comp Metabolic Panel    Lipid Profile   2. Heart  failure, NYHA class 1 (HCC)  Comp Metabolic Panel    Lipid Profile   3. ACC/AHA stage C systolic heart failure (HCC)  Comp Metabolic Panel    Lipid Profile   4. HTN (hypertension), malignant  Comp Metabolic Panel    Lipid Profile   5. Dyslipidemia associated with type 2 diabetes mellitus (HCC)  Lipid Profile   6. Obstructive sleep apnea syndrome     7. Type 2 diabetes mellitus with complication, without long-term current use of insulin ([facility])         Medical Decision Making:  Today's Assessment / Status / Plan:   1.  Hypertension: improved and Stable  -Continue carvedilol 50 mg twice a day  -Increase carvedilol to 50 mg twice a day  -Continue spironolactone 12.5 mg daily  -Monitor and log Blood pressures at home, 2 hours after medications. Call office or RTC if BP increasing or >180/100 or if symptoms of elevated blood pressure present. Reviewed s/sx of stroke and heart attack. Patient to go to ER or call 911 if present.     2.  HFrEF, Stage C, Class 1, LVEF 55% improved from 40%: Based on patient symptoms, he continues to remain stable.  -Heart failure due to hypertension, comorbid obesity and sleep apnea  -Currently not on diuretic  -Continue carvedilol 50 mg twice a day  -Continue spironolactone 12.5 mg daily patient wondering if he can take a full tablet of this.  Discussed that we will wait till his lab test come back.  -Patient was on lisinopril, no longer on lisinopril will look into this at next visit, but will hold in light of worsening kidney function  -No indication for ICD due to improvement of LVEF  -Reinforced s/sx of worsening heart failure with patient and weight monitoring. Pt verbalizes understanding. Pt to call office or RTC if present.     3.  Sleep apnea:  -Using for a year, tolerating well   -has follow up with Sleep medicine soon      4.  Dyslipidemia: Last LDL 47 on 2/12/2020  -Continue atorvastatin 20 mg daily    5.  Diabetes:  -Continue metformin 500 mg twice a day    Patient to get CMP and  lipid done in the next couple of weeks    FU in clinic in 6 months with labs soon. Sooner if needed.    Patient verbalizes understanding and agrees with the plan of care.     PLEASE NOTE: This Note was created using voice recognition Software. I have made every reasonable attempt to correct obvious errors, but I expect that there are errors of grammar and possibly content that I did not discover before finalizing the note

## 2021-04-08 DIAGNOSIS — I50.20 ACC/AHA STAGE C SYSTOLIC HEART FAILURE (HCC): ICD-10-CM

## 2021-04-08 DIAGNOSIS — I51.89 LEFT VENTRICULAR SYSTOLIC DYSFUNCTION, NYHA CLASS 2: ICD-10-CM

## 2021-04-12 RX ORDER — SPIRONOLACTONE 25 MG/1
TABLET ORAL
Qty: 45 TABLET | Refills: 1 | Status: SHIPPED | OUTPATIENT
Start: 2021-04-12 | End: 2021-09-09 | Stop reason: SDUPTHER

## 2021-04-12 NOTE — TELEPHONE ENCOUNTER
Refill X 6 months, sent to pharmacy.Pt. Seen in the last 6 months per protocol.   Lab Results   Component Value Date/Time    SODIUM 138 10/08/2020 06:26 AM    POTASSIUM 4.8 10/08/2020 06:26 AM    CHLORIDE 103 10/08/2020 06:26 AM    CO2 24 10/08/2020 06:26 AM    GLUCOSE 111 (H) 10/08/2020 06:26 AM    BUN 36 (H) 10/08/2020 06:26 AM    CREATININE 1.46 (H) 10/08/2020 06:26 AM

## 2021-04-16 DIAGNOSIS — I10 HYPERTENSION, UNSPECIFIED TYPE: ICD-10-CM

## 2021-04-16 DIAGNOSIS — E11.8 TYPE 2 DIABETES MELLITUS WITH COMPLICATION, WITHOUT LONG-TERM CURRENT USE OF INSULIN (HCC): ICD-10-CM

## 2021-04-16 RX ORDER — CARVEDILOL 25 MG/1
TABLET ORAL
Qty: 360 TABLET | Refills: 3 | Status: SHIPPED | OUTPATIENT
Start: 2021-04-16 | End: 2022-05-11 | Stop reason: SDUPTHER

## 2021-04-16 RX ORDER — LISINOPRIL 5 MG/1
5 TABLET ORAL DAILY
Qty: 90 TABLET | Refills: 3 | OUTPATIENT
Start: 2021-04-16

## 2021-04-16 NOTE — TELEPHONE ENCOUNTER
2.  HFrEF, Stage C, Class 1, LVEF 55% improved from 40%: Based on patient symptoms, he continues to remain stable.  -Heart failure due to hypertension, comorbid obesity and sleep apnea  -Currently not on diuretic  -Continue carvedilol 50 mg twice a day  -Continue spironolactone 12.5 mg daily patient wondering if he can take a full tablet of this.  Discussed that we will wait till his lab test come back.  -Patient was on lisinopril, no longer on lisinopril will look into this at next visit, but will hold in light of worsening kidney function  -No indication for ICD due to improvement of LVEF  -Reinforced s/sx of worsening heart failure with patient and weight monitoring. Pt verbalizes understanding. Pt to call office or RTC if present.

## 2021-04-29 DIAGNOSIS — E11.8 TYPE 2 DIABETES MELLITUS WITH COMPLICATION, WITHOUT LONG-TERM CURRENT USE OF INSULIN (HCC): ICD-10-CM

## 2021-04-29 RX ORDER — METFORMIN HYDROCHLORIDE 500 MG/1
500 TABLET, EXTENDED RELEASE ORAL DAILY
Qty: 90 TABLET | Refills: 0 | Status: SHIPPED | OUTPATIENT
Start: 2021-04-29 | End: 2021-08-02

## 2021-05-21 ENCOUNTER — TELEPHONE (OUTPATIENT)
Dept: CARDIOLOGY | Facility: MEDICAL CENTER | Age: 41
End: 2021-05-21

## 2021-05-21 NOTE — TELEPHONE ENCOUNTER
Left message for pt. To call.    KERRY Awad.  You 40 minutes ago (10:57 AM)     Could you find out if this patient has been on lisinopril and who restarted it.     Thanks

## 2021-08-25 DIAGNOSIS — E11.69 DYSLIPIDEMIA ASSOCIATED WITH TYPE 2 DIABETES MELLITUS (HCC): ICD-10-CM

## 2021-08-25 DIAGNOSIS — E78.5 DYSLIPIDEMIA ASSOCIATED WITH TYPE 2 DIABETES MELLITUS (HCC): ICD-10-CM

## 2021-08-26 RX ORDER — ATORVASTATIN CALCIUM 20 MG/1
20 TABLET, FILM COATED ORAL
Qty: 90 TABLET | Refills: 0 | Status: SHIPPED | OUTPATIENT
Start: 2021-08-26 | End: 2021-09-09

## 2021-09-07 SDOH — ECONOMIC STABILITY: INCOME INSECURITY: HOW HARD IS IT FOR YOU TO PAY FOR THE VERY BASICS LIKE FOOD, HOUSING, MEDICAL CARE, AND HEATING?: NOT HARD AT ALL

## 2021-09-07 SDOH — ECONOMIC STABILITY: HOUSING INSECURITY: IN THE LAST 12 MONTHS, HOW MANY PLACES HAVE YOU LIVED?: 1

## 2021-09-07 SDOH — HEALTH STABILITY: PHYSICAL HEALTH: ON AVERAGE, HOW MANY MINUTES DO YOU ENGAGE IN EXERCISE AT THIS LEVEL?: PATIENT DECLINED

## 2021-09-07 SDOH — ECONOMIC STABILITY: FOOD INSECURITY: WITHIN THE PAST 12 MONTHS, YOU WORRIED THAT YOUR FOOD WOULD RUN OUT BEFORE YOU GOT MONEY TO BUY MORE.: NEVER TRUE

## 2021-09-07 SDOH — HEALTH STABILITY: PHYSICAL HEALTH: ON AVERAGE, HOW MANY DAYS PER WEEK DO YOU ENGAGE IN MODERATE TO STRENUOUS EXERCISE (LIKE A BRISK WALK)?: 5 DAYS

## 2021-09-07 SDOH — ECONOMIC STABILITY: INCOME INSECURITY: IN THE LAST 12 MONTHS, WAS THERE A TIME WHEN YOU WERE NOT ABLE TO PAY THE MORTGAGE OR RENT ON TIME?: NO

## 2021-09-07 SDOH — ECONOMIC STABILITY: HOUSING INSECURITY
IN THE LAST 12 MONTHS, WAS THERE A TIME WHEN YOU DID NOT HAVE A STEADY PLACE TO SLEEP OR SLEPT IN A SHELTER (INCLUDING NOW)?: NO

## 2021-09-07 SDOH — ECONOMIC STABILITY: FOOD INSECURITY: WITHIN THE PAST 12 MONTHS, THE FOOD YOU BOUGHT JUST DIDN'T LAST AND YOU DIDN'T HAVE MONEY TO GET MORE.: NEVER TRUE

## 2021-09-07 SDOH — ECONOMIC STABILITY: TRANSPORTATION INSECURITY
IN THE PAST 12 MONTHS, HAS LACK OF TRANSPORTATION KEPT YOU FROM MEETINGS, WORK, OR FROM GETTING THINGS NEEDED FOR DAILY LIVING?: NO

## 2021-09-07 SDOH — HEALTH STABILITY: MENTAL HEALTH
STRESS IS WHEN SOMEONE FEELS TENSE, NERVOUS, ANXIOUS, OR CAN'T SLEEP AT NIGHT BECAUSE THEIR MIND IS TROUBLED. HOW STRESSED ARE YOU?: NOT AT ALL

## 2021-09-07 ASSESSMENT — SOCIAL DETERMINANTS OF HEALTH (SDOH)
DO YOU BELONG TO ANY CLUBS OR ORGANIZATIONS SUCH AS CHURCH GROUPS UNIONS, FRATERNAL OR ATHLETIC GROUPS, OR SCHOOL GROUPS?: NO
WITHIN THE PAST 12 MONTHS, YOU WORRIED THAT YOUR FOOD WOULD RUN OUT BEFORE YOU GOT THE MONEY TO BUY MORE: NEVER TRUE
HOW MANY DRINKS CONTAINING ALCOHOL DO YOU HAVE ON A TYPICAL DAY WHEN YOU ARE DRINKING: PATIENT DECLINED
IN A TYPICAL WEEK, HOW MANY TIMES DO YOU TALK ON THE PHONE WITH FAMILY, FRIENDS, OR NEIGHBORS?: ONCE A WEEK
DO YOU BELONG TO ANY CLUBS OR ORGANIZATIONS SUCH AS CHURCH GROUPS UNIONS, FRATERNAL OR ATHLETIC GROUPS, OR SCHOOL GROUPS?: NO
IN A TYPICAL WEEK, HOW MANY TIMES DO YOU TALK ON THE PHONE WITH FAMILY, FRIENDS, OR NEIGHBORS?: ONCE A WEEK
HOW OFTEN DO YOU GET TOGETHER WITH FRIENDS OR RELATIVES?: ONCE A WEEK
HOW OFTEN DO YOU ATTEND CHURCH OR RELIGIOUS SERVICES?: NEVER
HOW OFTEN DO YOU ATTEND CHURCH OR RELIGIOUS SERVICES?: NEVER
HOW OFTEN DO YOU HAVE A DRINK CONTAINING ALCOHOL: NEVER
HOW OFTEN DO YOU GET TOGETHER WITH FRIENDS OR RELATIVES?: ONCE A WEEK
HOW OFTEN DO YOU HAVE SIX OR MORE DRINKS ON ONE OCCASION: NEVER
HOW HARD IS IT FOR YOU TO PAY FOR THE VERY BASICS LIKE FOOD, HOUSING, MEDICAL CARE, AND HEATING?: NOT HARD AT ALL
HOW OFTEN DO YOU ATTENT MEETINGS OF THE CLUB OR ORGANIZATION YOU BELONG TO?: NEVER
HOW OFTEN DO YOU ATTENT MEETINGS OF THE CLUB OR ORGANIZATION YOU BELONG TO?: NEVER

## 2021-09-07 ASSESSMENT — LIFESTYLE VARIABLES
HOW MANY STANDARD DRINKS CONTAINING ALCOHOL DO YOU HAVE ON A TYPICAL DAY: PATIENT DECLINED
HOW OFTEN DO YOU HAVE SIX OR MORE DRINKS ON ONE OCCASION: NEVER
HOW OFTEN DO YOU HAVE A DRINK CONTAINING ALCOHOL: NEVER

## 2021-09-08 ENCOUNTER — HOSPITAL ENCOUNTER (OUTPATIENT)
Dept: LAB | Facility: MEDICAL CENTER | Age: 41
End: 2021-09-08
Attending: NURSE PRACTITIONER
Payer: COMMERCIAL

## 2021-09-08 DIAGNOSIS — E78.5 DYSLIPIDEMIA ASSOCIATED WITH TYPE 2 DIABETES MELLITUS (HCC): ICD-10-CM

## 2021-09-08 DIAGNOSIS — I50.9 HEART FAILURE, NYHA CLASS 1 (HCC): ICD-10-CM

## 2021-09-08 DIAGNOSIS — I10 HTN (HYPERTENSION), MALIGNANT: ICD-10-CM

## 2021-09-08 DIAGNOSIS — E11.69 DYSLIPIDEMIA ASSOCIATED WITH TYPE 2 DIABETES MELLITUS (HCC): ICD-10-CM

## 2021-09-08 DIAGNOSIS — Z79.899 HIGH RISK MEDICATION USE: ICD-10-CM

## 2021-09-08 DIAGNOSIS — I50.20 ACC/AHA STAGE C SYSTOLIC HEART FAILURE (HCC): ICD-10-CM

## 2021-09-08 LAB
ALBUMIN SERPL BCP-MCNC: 4.4 G/DL (ref 3.2–4.9)
ALBUMIN/GLOB SERPL: 1.3 G/DL
ALP SERPL-CCNC: 71 U/L (ref 30–99)
ALT SERPL-CCNC: 31 U/L (ref 2–50)
ANION GAP SERPL CALC-SCNC: 12 MMOL/L (ref 7–16)
AST SERPL-CCNC: 30 U/L (ref 12–45)
BILIRUB SERPL-MCNC: 0.6 MG/DL (ref 0.1–1.5)
BUN SERPL-MCNC: 22 MG/DL (ref 8–22)
CALCIUM SERPL-MCNC: 9.6 MG/DL (ref 8.5–10.5)
CHLORIDE SERPL-SCNC: 102 MMOL/L (ref 96–112)
CHOLEST SERPL-MCNC: 165 MG/DL (ref 100–199)
CO2 SERPL-SCNC: 24 MMOL/L (ref 20–33)
CREAT SERPL-MCNC: 1.38 MG/DL (ref 0.5–1.4)
FASTING STATUS PATIENT QL REPORTED: NORMAL
GLOBULIN SER CALC-MCNC: 3.4 G/DL (ref 1.9–3.5)
GLUCOSE SERPL-MCNC: 104 MG/DL (ref 65–99)
HDLC SERPL-MCNC: 31 MG/DL
LDLC SERPL CALC-MCNC: ABNORMAL MG/DL
POTASSIUM SERPL-SCNC: 4.3 MMOL/L (ref 3.6–5.5)
PROT SERPL-MCNC: 7.8 G/DL (ref 6–8.2)
SODIUM SERPL-SCNC: 138 MMOL/L (ref 135–145)
TRIGL SERPL-MCNC: 721 MG/DL (ref 0–149)

## 2021-09-08 PROCEDURE — 36415 COLL VENOUS BLD VENIPUNCTURE: CPT

## 2021-09-08 PROCEDURE — 80061 LIPID PANEL: CPT

## 2021-09-08 PROCEDURE — 80053 COMPREHEN METABOLIC PANEL: CPT

## 2021-09-09 ENCOUNTER — OFFICE VISIT (OUTPATIENT)
Dept: MEDICAL GROUP | Facility: PHYSICIAN GROUP | Age: 41
End: 2021-09-09
Payer: COMMERCIAL

## 2021-09-09 VITALS
DIASTOLIC BLOOD PRESSURE: 78 MMHG | BODY MASS INDEX: 43.32 KG/M2 | HEIGHT: 65 IN | SYSTOLIC BLOOD PRESSURE: 122 MMHG | OXYGEN SATURATION: 95 % | HEART RATE: 84 BPM | WEIGHT: 260 LBS | TEMPERATURE: 98 F

## 2021-09-09 DIAGNOSIS — G47.33 OBSTRUCTIVE SLEEP APNEA: Chronic | ICD-10-CM

## 2021-09-09 DIAGNOSIS — I50.22 HYPERTENSIVE HEART DISEASE WITH CHRONIC SYSTOLIC CONGESTIVE HEART FAILURE (HCC): ICD-10-CM

## 2021-09-09 DIAGNOSIS — I11.0 HYPERTENSIVE HEART DISEASE WITH CHRONIC SYSTOLIC CONGESTIVE HEART FAILURE (HCC): ICD-10-CM

## 2021-09-09 DIAGNOSIS — I10 HYPERTENSION, UNSPECIFIED TYPE: ICD-10-CM

## 2021-09-09 DIAGNOSIS — U07.1 COVID-19: ICD-10-CM

## 2021-09-09 DIAGNOSIS — E11.8 TYPE 2 DIABETES MELLITUS WITH COMPLICATION, WITHOUT LONG-TERM CURRENT USE OF INSULIN (HCC): ICD-10-CM

## 2021-09-09 DIAGNOSIS — I51.89 LEFT VENTRICULAR SYSTOLIC DYSFUNCTION, NYHA CLASS 2: ICD-10-CM

## 2021-09-09 DIAGNOSIS — N18.31 STAGE 3A CHRONIC KIDNEY DISEASE: ICD-10-CM

## 2021-09-09 DIAGNOSIS — E11.69 DYSLIPIDEMIA ASSOCIATED WITH TYPE 2 DIABETES MELLITUS (HCC): ICD-10-CM

## 2021-09-09 DIAGNOSIS — E78.5 DYSLIPIDEMIA ASSOCIATED WITH TYPE 2 DIABETES MELLITUS (HCC): ICD-10-CM

## 2021-09-09 DIAGNOSIS — I50.20 ACC/AHA STAGE C SYSTOLIC HEART FAILURE (HCC): ICD-10-CM

## 2021-09-09 PROCEDURE — 99214 OFFICE O/P EST MOD 30 MIN: CPT | Mod: CS | Performed by: NURSE PRACTITIONER

## 2021-09-09 RX ORDER — SPIRONOLACTONE 25 MG/1
12.5 TABLET ORAL DAILY
Qty: 45 TABLET | Refills: 3 | Status: SHIPPED | OUTPATIENT
Start: 2021-09-09 | End: 2021-10-25 | Stop reason: SDUPTHER

## 2021-09-09 RX ORDER — ROSUVASTATIN CALCIUM 20 MG/1
20 TABLET, COATED ORAL EVERY EVENING
Qty: 30 TABLET | Refills: 11 | Status: SHIPPED | OUTPATIENT
Start: 2021-09-09 | End: 2021-09-09 | Stop reason: SDUPTHER

## 2021-09-09 RX ORDER — ROSUVASTATIN CALCIUM 20 MG/1
20 TABLET, COATED ORAL EVERY EVENING
Qty: 90 TABLET | Refills: 3 | Status: SHIPPED | OUTPATIENT
Start: 2021-09-09 | End: 2022-05-24

## 2021-09-09 ASSESSMENT — PATIENT HEALTH QUESTIONNAIRE - PHQ9: CLINICAL INTERPRETATION OF PHQ2 SCORE: 0

## 2021-09-09 ASSESSMENT — FIBROSIS 4 INDEX: FIB4 SCORE: 1.17

## 2021-09-09 NOTE — ASSESSMENT & PLAN NOTE
Pt was diagnosed in July. He reports that his only symptom was nasal congestion and loss of taste and smell.     Patient denies any long lasting symptoms.

## 2021-09-09 NOTE — ASSESSMENT & PLAN NOTE
This is a chronic issue. Patient is stable on metformin. Since starting the metformin patient has made several lifestyle changes. He has been able to lose 100 pounds. His last 2 A1cs have been in the normal range.     Discussed discontinuing the metformin for 3 months to see how his blood sugars are running. Will have patient repeat A1c in 3 months.

## 2021-09-09 NOTE — ASSESSMENT & PLAN NOTE
This is a chronic issue. He is stable on coreg. He follows with cardiology. He has made lifestyle changes and has lost 100 pounds.     Continue following with cardiology, continue Coreg.

## 2021-09-09 NOTE — PROGRESS NOTES
"      CC: establish care                                                                                                                                      HPI:   John presents today with the following.    Problem   Covid-19   Stage 3 Chronic Kidney Disease (Hcc)   Obstructive Sleep Apnea   Dyslipidemia Associated With Type 2 Diabetes Mellitus (Hcc)   Hypertensive Heart Disease With Chronic Systolic Congestive Heart Failure (Hcc)   Type 2 Diabetes Mellitus With Complication, Without Long-Term Current Use of Insulin (Hcc)   Acc/Aha Stage C Systolic Heart Failure (Hcc)   Hypertension (Resolved)       Current Outpatient Medications   Medication Sig Dispense Refill   • spironolactone (ALDACTONE) 25 MG Tab Take 0.5 Tablets by mouth every day. 45 Tablet 3   • rosuvastatin (CRESTOR) 20 MG Tab Take 1 Tablet by mouth every evening. 90 Tablet 3   • carvedilol (COREG) 25 MG Tab TAKE 2 TABLETS BY MOUTH TWICE DAILY WITH MEALS 360 tablet 3     No current facility-administered medications for this visit.       Allergies as of 09/09/2021 - Reviewed 09/09/2021   Allergen Reaction Noted   • Sulfa drugs Rash 07/17/2015        ROS:  Gen: no fevers/chills, no changes in weight  Pulm: no sob, no cough  CV: no chest pain, no palpitations  GI: no nausea/vomiting, no diarrhea  : no dysuria  MSk: + myalgias  Skin: no rash  Neuro: no headaches, no numbness/tingling     /78 (BP Location: Right arm, Patient Position: Sitting, BP Cuff Size: Large adult)   Pulse 84   Temp 36.7 °C (98 °F) (Temporal)   Ht 1.651 m (5' 5\")   Wt 118 kg (260 lb)   SpO2 95%   BMI 43.27 kg/m²     Physical Exam:  Gen:         Alert and oriented, No apparent distress.  Neck:        No Lymphadenopathy.   Lungs:     Clear to auscultation bilaterally. No wheezes, rales, or rhonchi.   CV:          Regular rate and rhythm. No murmurs, rubs or gallops.         Ext:          No clubbing, cyanosis, or peripheral edema.  Skin:  All visible skin intact without " lesions.       Assessment and Plan:  41 y.o. male with the following issues.    1. Hypertension, unspecified type     2. Type 2 diabetes mellitus with complication, without long-term current use of insulin (HCC)  HEMOGLOBIN A1C   3. Hypertensive heart disease with chronic systolic congestive heart failure (HCC)     4. Dyslipidemia associated with type 2 diabetes mellitus (HCC)  Lipid Profile   5. ACC/AHA stage C systolic heart failure (HCC)  spironolactone (ALDACTONE) 25 MG Tab   6. Left ventricular systolic dysfunction, NYHA class 2  spironolactone (ALDACTONE) 25 MG Tab   7. COVID-19     8. Obstructive sleep apnea     9. Stage 3a chronic kidney disease (HCC)          Type 2 diabetes mellitus with complication, without long-term current use of insulin (HCC)  This is a chronic issue. Patient is stable on metformin. Since starting the metformin patient has made several lifestyle changes. He has been able to lose 100 pounds. His last 2 A1cs have been in the normal range.     Discussed discontinuing the metformin for 3 months to see how his blood sugars are running. Will have patient repeat A1c in 3 months.     COVID-19  Pt was diagnosed in July. He reports that his only symptom was nasal congestion and loss of taste and smell.     Patient denies any long lasting symptoms.      Hypertensive heart disease with chronic systolic congestive heart failure (HCC)  This is a chronic issue. He is stable on coreg. He follows with cardiology. He has made lifestyle changes and has lost 100 pounds.     Continue following with cardiology, continue Coreg.        Dyslipidemia associated with type 2 diabetes mellitus (HCC)  This is a chronic issue.  Patient has previously been stable on atorvastatin 20 mg daily.  Patient's most recent lipid panel does show an extremely elevated triglycerides greater than 700.  Patient has not had triglycerides is elevated in the past.  This may be due to patient's recent Covid infection.     Will change  patient to rosuvastatin 20 mg to help decrease triglycerides.  We will recheck lipid panel in 3 months.    ACC/AHA stage C systolic heart failure (HCC)  This is a chronic stable condition.  Patient is stable on spironolactone rosuvastatin and carvedilol.  Patient does follow with cardiology.  Patient was initially diagnosed with heart failure in 2019.  It was found that patient had significant sleep apnea as well as significantly elevated blood pressure.    Continue following with cardiology.  Continue medications.    Obstructive sleep apnea  This is a chronic issue.  Patient is compliant with CPAP.  He reports improvement in his sleep with CPAP use.    Continue following with pulmonology.  Continue CPAP use.    Stage 3 chronic kidney disease (HCC)  This is a chronic stable condition.  Patient's most recent GFR was 57.  This is an improvement after discontinuing lisinopril 5 mg.    Continue to monitor renal function every 6 to 12 months.    Return in about 4 months (around 1/9/2022) for follow up for labs.      I have placed the below orders and discussed them with an approved delegating provider.  The MA is performing the below orders under the direction of Dr. Carr.    Please note that this dictation was created using voice recognition software. I have worked with consultants from the vendor as well as technical experts from [employer] [facility] [employer] to optimize the interface. I have made every reasonable attempt to correct obvious errors, but I expect that there are errors of grammar and possibly content that I did not discover before finalizing the note.

## 2021-09-09 NOTE — ASSESSMENT & PLAN NOTE
This is a chronic issue. He is stable on lisinopril and coreg. He follows with cardiology. He has made lifestyle changes and has

## 2021-09-10 NOTE — ASSESSMENT & PLAN NOTE
This is a chronic issue.  Patient is compliant with CPAP.  He reports improvement in his sleep with CPAP use.    Continue following with pulmonology.  Continue CPAP use.

## 2021-09-10 NOTE — ASSESSMENT & PLAN NOTE
This is a chronic issue.  Patient has previously been stable on atorvastatin 20 mg daily.  Patient's most recent lipid panel does show an extremely elevated triglycerides greater than 700.  Patient has not had triglycerides is elevated in the past.  This may be due to patient's recent Covid infection.     Will change patient to rosuvastatin 20 mg to help decrease triglycerides.  We will recheck lipid panel in 3 months.

## 2021-09-10 NOTE — ASSESSMENT & PLAN NOTE
This is a chronic stable condition.  Patient is stable on spironolactone rosuvastatin and carvedilol.  Patient does follow with cardiology.  Patient was initially diagnosed with heart failure in 2019.  It was found that patient had significant sleep apnea as well as significantly elevated blood pressure.    Continue following with cardiology.  Continue medications.

## 2021-09-10 NOTE — ASSESSMENT & PLAN NOTE
This is a chronic stable condition.  Patient's most recent GFR was 57.  This is an improvement after discontinuing lisinopril 5 mg.    Continue to monitor renal function every 6 to 12 months.

## 2021-10-07 ENCOUNTER — TELEPHONE (OUTPATIENT)
Dept: CARDIOLOGY | Facility: MEDICAL CENTER | Age: 41
End: 2021-10-07

## 2021-10-11 ENCOUNTER — APPOINTMENT (OUTPATIENT)
Dept: CARDIOLOGY | Facility: MEDICAL CENTER | Age: 41
End: 2021-10-11
Payer: COMMERCIAL

## 2021-10-12 ENCOUNTER — HOSPITAL ENCOUNTER (OUTPATIENT)
Dept: LAB | Facility: MEDICAL CENTER | Age: 41
End: 2021-10-12
Attending: NURSE PRACTITIONER
Payer: COMMERCIAL

## 2021-10-12 DIAGNOSIS — E11.8 TYPE 2 DIABETES MELLITUS WITH COMPLICATION, WITHOUT LONG-TERM CURRENT USE OF INSULIN (HCC): ICD-10-CM

## 2021-10-12 DIAGNOSIS — E78.5 DYSLIPIDEMIA ASSOCIATED WITH TYPE 2 DIABETES MELLITUS (HCC): ICD-10-CM

## 2021-10-12 DIAGNOSIS — E11.69 DYSLIPIDEMIA ASSOCIATED WITH TYPE 2 DIABETES MELLITUS (HCC): ICD-10-CM

## 2021-10-12 LAB
CHOLEST SERPL-MCNC: 143 MG/DL (ref 100–199)
EST. AVERAGE GLUCOSE BLD GHB EST-MCNC: 131 MG/DL
FASTING STATUS PATIENT QL REPORTED: NORMAL
HBA1C MFR BLD: 6.2 % (ref 4–5.6)
HDLC SERPL-MCNC: 30 MG/DL
LDLC SERPL CALC-MCNC: 37 MG/DL
TRIGL SERPL-MCNC: 378 MG/DL (ref 0–149)

## 2021-10-12 PROCEDURE — 83036 HEMOGLOBIN GLYCOSYLATED A1C: CPT

## 2021-10-12 PROCEDURE — 36415 COLL VENOUS BLD VENIPUNCTURE: CPT

## 2021-10-12 PROCEDURE — 80061 LIPID PANEL: CPT

## 2021-10-25 ENCOUNTER — OFFICE VISIT (OUTPATIENT)
Dept: CARDIOLOGY | Facility: MEDICAL CENTER | Age: 41
End: 2021-10-25
Payer: COMMERCIAL

## 2021-10-25 VITALS
OXYGEN SATURATION: 95 % | SYSTOLIC BLOOD PRESSURE: 112 MMHG | HEART RATE: 78 BPM | DIASTOLIC BLOOD PRESSURE: 84 MMHG | RESPIRATION RATE: 14 BRPM | HEIGHT: 65 IN | WEIGHT: 265.4 LBS | BODY MASS INDEX: 44.22 KG/M2

## 2021-10-25 DIAGNOSIS — Z79.899 HIGH RISK MEDICATION USE: ICD-10-CM

## 2021-10-25 DIAGNOSIS — I10 HTN (HYPERTENSION), MALIGNANT: ICD-10-CM

## 2021-10-25 DIAGNOSIS — E11.8 TYPE 2 DIABETES MELLITUS WITH COMPLICATION, WITHOUT LONG-TERM CURRENT USE OF INSULIN (HCC): ICD-10-CM

## 2021-10-25 DIAGNOSIS — G47.33 OBSTRUCTIVE SLEEP APNEA SYNDROME: ICD-10-CM

## 2021-10-25 DIAGNOSIS — I50.20 ACC/AHA STAGE C SYSTOLIC HEART FAILURE (HCC): ICD-10-CM

## 2021-10-25 DIAGNOSIS — E78.5 DYSLIPIDEMIA ASSOCIATED WITH TYPE 2 DIABETES MELLITUS (HCC): ICD-10-CM

## 2021-10-25 DIAGNOSIS — I11.0 HYPERTENSIVE HEART DISEASE WITH CHRONIC SYSTOLIC CONGESTIVE HEART FAILURE (HCC): ICD-10-CM

## 2021-10-25 DIAGNOSIS — I50.22 HYPERTENSIVE HEART DISEASE WITH CHRONIC SYSTOLIC CONGESTIVE HEART FAILURE (HCC): ICD-10-CM

## 2021-10-25 DIAGNOSIS — E11.69 DYSLIPIDEMIA ASSOCIATED WITH TYPE 2 DIABETES MELLITUS (HCC): ICD-10-CM

## 2021-10-25 DIAGNOSIS — I50.9 HEART FAILURE, NYHA CLASS 1 (HCC): ICD-10-CM

## 2021-10-25 PROCEDURE — 99214 OFFICE O/P EST MOD 30 MIN: CPT | Performed by: NURSE PRACTITIONER

## 2021-10-25 RX ORDER — SPIRONOLACTONE 25 MG/1
25 TABLET ORAL DAILY
Qty: 90 TABLET | Refills: 3 | Status: SHIPPED | OUTPATIENT
Start: 2021-10-25 | End: 2021-12-03 | Stop reason: SDUPTHER

## 2021-10-25 ASSESSMENT — ENCOUNTER SYMPTOMS
ABDOMINAL PAIN: 0
MYALGIAS: 0
DIZZINESS: 1
FEVER: 0
PALPITATIONS: 0
SHORTNESS OF BREATH: 0
PND: 0
CLAUDICATION: 0
ORTHOPNEA: 0
COUGH: 0

## 2021-10-25 ASSESSMENT — FIBROSIS 4 INDEX: FIB4 SCORE: 1.17

## 2021-10-25 NOTE — PROGRESS NOTES
Chief Complaint   Patient presents with   • CHF (Systolic)     F/V Dx: ACC/AHA stage C systolic heart failure (HCC)   • Hypertension     F/V Dx: Hypertensive heart disease with chronic systolic congestive heart failure (HCC)       Subjective:   Jhon Wills is a 40 y.o. male who is following up on his heart failure.    Patient of the heart failure clinic.  He was last seen in clinic on 4/5/2021.  No changes were made.    He does state he has been taking his carvedilol only daily. He has been dizzy with the evening dose.    Otherwise, Patient feels well, denies chest pain, shortness of breath, palpitations, orthopnea, PND or Edema.     Home weights are around 250-255 lbs.     Additonally, patient has the following medical problems:    -Diabetes: Controlled, current A1c 6.2, recently taken off Metformin and pt is currently tying to diet control    -Sleep apnea: Using CPAP regularly    -Obesity    -Hypertension    -Dyslipidemia    Past Medical History:   Diagnosis Date   • Allergy    • Apnea, sleep    • Back pain    • CHF (congestive heart failure) ([facility])    • Chickenpox    • Constipation    • Cough    • Daytime sleepiness    • Diarrhea    • Difficulty breathing    • Dizziness    • Elevated troponin I level 8/20/2019   • Fatigue    • Frequent headaches    • Frequent urination    • Gasping for breath    • GERD (gastroesophageal reflux disease)    • Hearing difficulty    • Hypertension    • Morning headache    • Obesity    • Painful joint    • Rhinitis    • Shortness of breath    • Sleep apnea    • Snoring    • Sore muscles    • Sweat, sweating, excessive    • Swelling of lower extremity    • Uncontrolled type 2 diabetes mellitus (HCC) 8/23/2019   • Weakness    • Wheezing      Past Surgical History:   Procedure Laterality Date   • TYMPANOMASTOIDECTOMY Left 2000     Family History   Problem Relation Age of Onset   • Hypertension Maternal Grandmother    • Sleep Apnea Father    • Hypertension Father    • Other Sister          gastroparesis     Social History     Socioeconomic History   • Marital status:      Spouse name: Not on file   • Number of children: Not on file   • Years of education: Not on file   • Highest education level: Some college, no degree   Occupational History   • Not on file   Tobacco Use   • Smoking status: Current Every Day Smoker     Packs/day: 0.50     Years: 10.00     Pack years: 5.00     Types: Cigarettes     Start date: 3/16/1996     Last attempt to quit: 2008     Years since quittin.9   • Smokeless tobacco: Never Used   • Tobacco comment: Quit full packs in . Smokes 1 cigarette per day currently   Vaping Use   • Vaping Use: Never used   Substance and Sexual Activity   • Alcohol use: Never   • Drug use: Never   • Sexual activity: Not on file   Other Topics Concern   • Not on file   Social History Narrative   • Not on file     Social Determinants of Health     Financial Resource Strain: Low Risk    • Difficulty of Paying Living Expenses: Not hard at all   Food Insecurity: No Food Insecurity   • Worried About Running Out of Food in the Last Year: Never true   • Ran Out of Food in the Last Year: Never true   Transportation Needs: No Transportation Needs   • Lack of Transportation (Medical): No   • Lack of Transportation (Non-Medical): No   Physical Activity: Unknown   • Days of Exercise per Week: 5 days   • Minutes of Exercise per Session: Patient refused   Stress: No Stress Concern Present   • Feeling of Stress : Not at all   Social Connections: Socially Isolated   • Frequency of Communication with Friends and Family: Once a week   • Frequency of Social Gatherings with Friends and Family: Once a week   • Attends [employer] Services: Never   • Active Member of Clubs or Organizations: No   • Attends Club or Organization Meetings: Never   • Marital Status:    Intimate Partner Violence:    • Fear of Current or Ex-Partner:    • Emotionally Abused:    • Physically Abused:    • Sexually  "Abused:      Allergies   Allergen Reactions   • Sulfa Drugs Rash     Heat rash      Outpatient Encounter Medications as of 10/25/2021   Medication Sig Dispense Refill   • spironolactone (ALDACTONE) 25 MG Tab Take 1 Tablet by mouth every day. 90 Tablet 3   • rosuvastatin (CRESTOR) 20 MG Tab Take 1 Tablet by mouth every evening. 90 Tablet 3   • carvedilol (COREG) 25 MG Tab TAKE 2 TABLETS BY MOUTH TWICE DAILY WITH MEALS 360 tablet 3   • [DISCONTINUED] spironolactone (ALDACTONE) 25 MG Tab Take 0.5 Tablets by mouth every day. 45 Tablet 3     No facility-administered encounter medications on file as of 10/25/2021.     Review of Systems   Constitutional: Negative for fever and malaise/fatigue.   Respiratory: Negative for cough and shortness of breath.    Cardiovascular: Negative for chest pain, palpitations, orthopnea, claudication, leg swelling and PND.   Gastrointestinal: Negative for abdominal pain.   Musculoskeletal: Negative for myalgias.   Neurological: Positive for dizziness (with evening carvedilol).   All other systems reviewed and are negative.       Objective:   /84 (BP Location: Left arm, Patient Position: Sitting, BP Cuff Size: Large adult)   Pulse 78   Resp 14   Ht 1.651 m (5' 5\")   Wt 120 kg (265 lb 6.4 oz)   SpO2 95%   BMI 44.16 kg/m²     Physical Exam  Vitals reviewed.   Constitutional:       Appearance: He is well-developed.   HENT:      Head: Normocephalic and atraumatic.   Eyes:      Pupils: Pupils are equal, round, and reactive to light.   Neck:      Vascular: No JVD.   Cardiovascular:      Rate and Rhythm: Normal rate and regular rhythm.      Heart sounds: Normal heart sounds.   Pulmonary:      Effort: Pulmonary effort is normal. No respiratory distress.      Breath sounds: Normal breath sounds. No wheezing or rales.   Abdominal:      General: Bowel sounds are normal.      Palpations: Abdomen is soft.   Musculoskeletal:      Cervical back: Normal range of motion and neck supple.      Right " lower leg: No edema.      Left lower leg: No edema.   Skin:     General: Skin is warm and dry.   Neurological:      Mental Status: He is alert and oriented to person, place, and time.   Psychiatric:         Behavior: Behavior normal.       Lab Results   Component Value Date/Time    CHOLSTRLTOT 143 10/12/2021 06:18 AM    LDL 37 10/12/2021 06:18 AM    HDL 30 (A) 10/12/2021 06:18 AM    TRIGLYCERIDE 378 (H) 10/12/2021 06:18 AM       Lab Results   Component Value Date/Time    SODIUM 138 09/08/2021 06:08 AM    POTASSIUM 4.3 09/08/2021 06:08 AM    CHLORIDE 102 09/08/2021 06:08 AM    CO2 24 09/08/2021 06:08 AM    GLUCOSE 104 (H) 09/08/2021 06:08 AM    BUN 22 09/08/2021 06:08 AM    CREATININE 1.38 09/08/2021 06:08 AM     Lab Results   Component Value Date/Time    ALKPHOSPHAT 71 09/08/2021 06:08 AM    ASTSGOT 30 09/08/2021 06:08 AM    ALTSGPT 31 09/08/2021 06:08 AM    TBILIRUBIN 0.6 09/08/2021 06:08 AM      Results for KEERTHI CAMPOS SAE (MRN [id number]) as of 2/25/2020 17:02   Ref. Range 2/12/2020 10:33   Glycohemoglobin Latest Ref Range: 0.0 - 5.6 % 5.3       Transthoracic Echo Report 8/21/2019  No prior study is available for comparison.   Normal left ventricular chamber size.  Left ventricular ejection fraction is visually estimated to be 40%.  Normal inferior vena cava size and inspiratory collapse.  Poor valvular visualization.     Transthoracic Echo Report 11/4/2019  Compared to the images of the prior study done 08/21/2019, the EF has normalized.  Moderate concentric left ventricular hypertrophy.  Left ventricular ejection fraction is visually estimated to be 55%.  Unable to estimate pulmonary artery pressure due to an inadequate tricuspid regurgitant jet.    Assessment:     1. ACC/AHA stage C systolic heart failure (HCC)  spironolactone (ALDACTONE) 25 MG Tab    Basic Metabolic Panel   2. High risk medication use     3. Heart failure, NYHA class 1 (HCC)     4. Dyslipidemia associated with type 2 diabetes mellitus (HCC)      5. Obstructive sleep apnea syndrome     6. HTN (hypertension), malignant     7. Hypertensive heart disease with chronic systolic congestive heart failure (HCC)     8. Type 2 diabetes mellitus with complication, without long-term current use of insulin (HCC)         Medical Decision Making:  Today's Assessment / Status / Plan:   1.  Hypertension: Stable  -Continue carvedilol 50 mg in AM and try taking 25 mg in PM  -Increase spironolactone to 25 mg daily  -Monitor and log Blood pressures at home, 2 hours after medications. Call office or RTC if BP increasing or >180/100 or if symptoms of elevated blood pressure present. Reviewed s/sx of stroke and heart attack. Patient to go to ER or call 911 if present.     2.  HFrEF, Stage C, Class 1, LVEF 55% improved from 40%: Based on patient symptoms, he continues to remain stable.  -Heart failure due to hypertension, comorbid obesity and sleep apnea  -Currently not on diuretic  -Continue carvedilol 50 in AM and 25 in PM  -Increase spironolactone 25 mg daily  -not on lisinopril due to CKD/STEPHEN  -BMP in 1 week  -No indication for ICD due to improvement of LVEF  -Reinforced s/sx of worsening heart failure with patient and weight monitoring. Pt verbalizes understanding. Pt to call office or RTC if present.     3.  Sleep apnea:  -Using CPAP, tolerating well   -Continue follow-up with sleep medicine    4.  Dyslipidemia: Last LDL 37 on 10/12/2021  -Continue rosuvastatin  20 mg daily    5.  Diabetes:  -Diet controlled, PCP following    BMP in 1 week.    Will try to order labs for 6 months once BMP comes back    FU in clinic in 6 months with labs. Sooner if needed.    Patient verbalizes understanding and agrees with the plan of care.     PLEASE NOTE: This Note was created using voice recognition Software. I have made every reasonable attempt to correct obvious errors, but I expect that there are errors of grammar and possibly content that I did not discover before finalizing the  note

## 2021-11-17 ENCOUNTER — HOSPITAL ENCOUNTER (OUTPATIENT)
Dept: LAB | Facility: MEDICAL CENTER | Age: 41
End: 2021-11-17
Attending: NURSE PRACTITIONER
Payer: COMMERCIAL

## 2021-11-17 DIAGNOSIS — I50.20 ACC/AHA STAGE C SYSTOLIC HEART FAILURE (HCC): ICD-10-CM

## 2021-11-17 LAB
ANION GAP SERPL CALC-SCNC: 14 MMOL/L (ref 7–16)
BUN SERPL-MCNC: 24 MG/DL (ref 8–22)
CALCIUM SERPL-MCNC: 10.6 MG/DL (ref 8.5–10.5)
CHLORIDE SERPL-SCNC: 100 MMOL/L (ref 96–112)
CO2 SERPL-SCNC: 25 MMOL/L (ref 20–33)
CREAT SERPL-MCNC: 1.52 MG/DL (ref 0.5–1.4)
GLUCOSE SERPL-MCNC: 113 MG/DL (ref 65–99)
POTASSIUM SERPL-SCNC: 4.6 MMOL/L (ref 3.6–5.5)
SODIUM SERPL-SCNC: 139 MMOL/L (ref 135–145)

## 2021-11-17 PROCEDURE — 36415 COLL VENOUS BLD VENIPUNCTURE: CPT

## 2021-11-17 PROCEDURE — 80048 BASIC METABOLIC PNL TOTAL CA: CPT

## 2021-11-25 NOTE — RESULT ENCOUNTER NOTE
Slight worsening of kidney function, will please have patient follow-up with repeat BMP prior to his follow-up visit.

## 2021-11-29 ENCOUNTER — TELEPHONE (OUTPATIENT)
Dept: CARDIOLOGY | Facility: MEDICAL CENTER | Age: 41
End: 2021-11-29

## 2021-11-29 DIAGNOSIS — Z79.899 HIGH RISK MEDICATION USE: ICD-10-CM

## 2021-11-30 NOTE — TELEPHONE ENCOUNTER
KERRY Awad.  Kali Mendoza R.N.  Slight worsening of kidney function, will please have patient follow-up with repeat BMP prior to his follow-up visit.       Pt notified via [employer]

## 2021-12-03 DIAGNOSIS — I50.20 ACC/AHA STAGE C SYSTOLIC HEART FAILURE (HCC): ICD-10-CM

## 2021-12-08 RX ORDER — SPIRONOLACTONE 25 MG/1
25 TABLET ORAL DAILY
Qty: 90 TABLET | Refills: 3 | Status: SHIPPED | OUTPATIENT
Start: 2021-12-08 | End: 2022-04-25

## 2022-01-12 ENCOUNTER — TELEPHONE (OUTPATIENT)
Dept: MEDICAL GROUP | Facility: PHYSICIAN GROUP | Age: 42
End: 2022-01-12
Payer: COMMERCIAL

## 2022-01-12 NOTE — TELEPHONE ENCOUNTER
VOICEMAIL  1. Caller Name: John Wills                        Call Back Number: [phone] (home)       2. Message: Pt has an apt on the 27th and was wondering if he needed some labs done prior to his apt.    3. Patient approves office to leave a detailed voicemail/MyChart message: yes

## 2022-01-27 ENCOUNTER — OFFICE VISIT (OUTPATIENT)
Dept: MEDICAL GROUP | Facility: PHYSICIAN GROUP | Age: 42
End: 2022-01-27
Payer: COMMERCIAL

## 2022-01-27 VITALS
TEMPERATURE: 97.4 F | OXYGEN SATURATION: 96 % | BODY MASS INDEX: 44.65 KG/M2 | RESPIRATION RATE: 14 BRPM | SYSTOLIC BLOOD PRESSURE: 120 MMHG | WEIGHT: 268 LBS | DIASTOLIC BLOOD PRESSURE: 86 MMHG | HEIGHT: 65 IN | HEART RATE: 88 BPM

## 2022-01-27 DIAGNOSIS — N18.31 STAGE 3A CHRONIC KIDNEY DISEASE: ICD-10-CM

## 2022-01-27 DIAGNOSIS — E78.5 DYSLIPIDEMIA ASSOCIATED WITH TYPE 2 DIABETES MELLITUS (HCC): ICD-10-CM

## 2022-01-27 DIAGNOSIS — E11.69 DYSLIPIDEMIA ASSOCIATED WITH TYPE 2 DIABETES MELLITUS (HCC): ICD-10-CM

## 2022-01-27 DIAGNOSIS — G47.33 OBSTRUCTIVE SLEEP APNEA: ICD-10-CM

## 2022-01-27 DIAGNOSIS — E11.8 TYPE 2 DIABETES MELLITUS WITH COMPLICATION, WITHOUT LONG-TERM CURRENT USE OF INSULIN (HCC): ICD-10-CM

## 2022-01-27 DIAGNOSIS — Z23 NEED FOR VACCINATION: ICD-10-CM

## 2022-01-27 DIAGNOSIS — D75.1 POLYCYTHEMIA: ICD-10-CM

## 2022-01-27 LAB
HBA1C MFR BLD: 6.7 % (ref 0–5.6)
INT CON NEG: NEGATIVE
INT CON POS: POSITIVE

## 2022-01-27 PROCEDURE — 90715 TDAP VACCINE 7 YRS/> IM: CPT | Performed by: NURSE PRACTITIONER

## 2022-01-27 PROCEDURE — 90471 IMMUNIZATION ADMIN: CPT | Performed by: NURSE PRACTITIONER

## 2022-01-27 PROCEDURE — 83036 HEMOGLOBIN GLYCOSYLATED A1C: CPT | Performed by: NURSE PRACTITIONER

## 2022-01-27 PROCEDURE — 99214 OFFICE O/P EST MOD 30 MIN: CPT | Mod: 25 | Performed by: NURSE PRACTITIONER

## 2022-01-27 ASSESSMENT — FIBROSIS 4 INDEX: FIB4 SCORE: 1.17

## 2022-01-27 NOTE — ASSESSMENT & PLAN NOTE
This is a chronic stable condition. Patients last GFR was 51.     Patient to continue avoiding nephrotoxic agents.

## 2022-01-27 NOTE — ASSESSMENT & PLAN NOTE
This is a chronic stable condition. Patient is stable on rosuvastatin. Last Lipid patients triglycerides were in the 300s.     Continue rosuvastatin and recheck lipid.

## 2022-01-27 NOTE — ASSESSMENT & PLAN NOTE
This is a chronic condition. Patient has had stable A1c, was taken off of metformin at last visit. He has been controlling his blood sugar at home with diet.     Will check A1c in office. A1c is 6.7%.     Will order jardiance for patient to start.

## 2022-01-27 NOTE — PROGRESS NOTES
"  CC: follow up for diabetes                                                                                                                                   HPI:   John presents today with the following.    Problem   Stage 3 Chronic Kidney Disease (Hcc)   Dyslipidemia Associated With Type 2 Diabetes Mellitus (Hcc)   Type 2 Diabetes Mellitus With Complication, Without Long-Term Current Use of Insulin (Hcc)   Polycythemia (Resolved)       Current Outpatient Medications   Medication Sig Dispense Refill   • Empagliflozin 10 MG Tab Take 10 mg by mouth every day. 90 Tablet 1   • spironolactone (ALDACTONE) 25 MG Tab Take 1 Tablet by mouth every day. 90 Tablet 3   • rosuvastatin (CRESTOR) 20 MG Tab Take 1 Tablet by mouth every evening. 90 Tablet 3   • carvedilol (COREG) 25 MG Tab TAKE 2 TABLETS BY MOUTH TWICE DAILY WITH MEALS 360 tablet 3     No current facility-administered medications for this visit.       Allergies as of 01/27/2022 - Reviewed 01/27/2022   Allergen Reaction Noted   • Sulfa drugs Rash 07/17/2015      ROS:  All systems negative expect as addressed in assessment and plan.     /86 (BP Location: Left arm, Patient Position: Sitting, BP Cuff Size: Adult)   Pulse 88   Temp 36.3 °C (97.4 °F) (Temporal)   Resp 14   Ht 1.651 m (5' 5\")   Wt 122 kg (268 lb)   SpO2 96%   BMI 44.60 kg/m²     Physical Exam:  Gen:         Alert and oriented, No apparent distress.  Neck:        No Lymphadenopathy.   Lungs:     Clear to auscultation bilaterally. No wheezes, rales, or rhonchi.   CV:          Regular rate and rhythm. No murmurs, rubs or gallops.         Ext:          No clubbing, cyanosis, or peripheral edema.  Skin:  All visible skin intact without lesions.       Assessment and Plan:  41 y.o. male with the following issues.    1. Type 2 diabetes mellitus with complication, without long-term current use of insulin (HCC)  POCT Hemoglobin A1C    Lipid Profile    MICROALBUMIN CREAT RATIO URINE    " URINALYSIS,CULTURE IF INDICATED   2. Dyslipidemia associated with type 2 diabetes mellitus (HCC)  Lipid Profile   3. Polycythemia  CBC WITHOUT DIFFERENTIAL   4. Obstructive sleep apnea  CBC WITHOUT DIFFERENTIAL   5. Stage 3a chronic kidney disease (HCC)  Comp Metabolic Panel   6. Need for vaccination  Tdap =>6yo IM        Type 2 diabetes mellitus with complication, without long-term current use of insulin (HCC)  This is a chronic condition. Patient has had stable A1c, was taken off of metformin at last visit. He has been controlling his blood sugar at home with diet.     Will check A1c in office. A1c is 6.7%.     Will order jardiance for patient to start.     Stage 3 chronic kidney disease (HCC)  This is a chronic stable condition. Patients last GFR was 51.     Patient to continue avoiding nephrotoxic agents.     Dyslipidemia associated with type 2 diabetes mellitus (HCC)  This is a chronic stable condition. Patient is stable on rosuvastatin. Last Lipid patients triglycerides were in the 300s.     Continue rosuvastatin and recheck lipid.     Return in about 4 months (around 5/27/2022) for Follow up fro diabetes.    I have placed the below orders and discussed them with an approved delegating provider.  The MA is performing the below orders under the direction of Dr. carpenter.    Please note that this dictation was created using voice recognition software. I have worked with consultants from the vendor as well as technical experts from [employer] to optimize the interface. I have made every reasonable attempt to correct obvious errors, but I expect that there are errors of grammar and possibly content that I did not discover before finalizing the note.

## 2022-01-28 ENCOUNTER — TELEPHONE (OUTPATIENT)
Dept: MEDICAL GROUP | Facility: PHYSICIAN GROUP | Age: 42
End: 2022-01-28
Payer: COMMERCIAL

## 2022-01-28 NOTE — TELEPHONE ENCOUNTER
----- Message from ELVER Pinedo sent at 1/27/2022  3:25 PM PST -----  Please request records from [facility] on [facility].

## 2022-01-28 NOTE — LETTER
Critical access hospital  Daniela Salcido A.P.R.N.  [address]  [place] [address]  Fax: [phone]   Authorization for Release/Disclosure of   Protected Health Information   Name: KEERTHI CAMPOS : 1980 SSN: [id number]   Address: [address] [place] [address] [place] [address] Phone:    [phone] (home)    I authorize the entity listed below to release/disclose the PHI below to:   Critical access hospital/Daniela Salcido, A.P.R.N. and Daniela Salcido, A.P.R.N.   Provider or Entity Name:  [facility] on [facility].    Address   [place] [facility], [facility]   Phone:  [phone]    Fax:     Reason for request: continuity of care   Information to be released:    [  ] LAST COLONOSCOPY,  including any PATH REPORT and follow-up  [  ] LAST FIT/COLOGUARD RESULT [  ] LAST DEXA  [  ] LAST MAMMOGRAM  [  ] LAST PAP  [  ] LAST LABS [  ] RETINA EXAM REPORT  [  ] IMMUNIZATION RECORDS  [xxx  ] Release all info      [  ] Check here and initial the line next to each item to release ALL health information INCLUDING  _____ Care and treatment for drug and / or alcohol abuse  _____ HIV testing, infection status, or AIDS  _____ Genetic Testing    DATES OF SERVICE OR TIME PERIOD TO BE DISCLOSED: _____________  I understand and acknowledge that:  * This Authorization may be revoked at any time by you in writing, except if your health information has already been used or disclosed.  * Your health information that will be used or disclosed as a result of you signing this authorization could be re-disclosed by the recipient. If this occurs, your re-disclosed health information may no longer be protected by State or Federal laws.  * You may refuse to sign this Authorization. Your refusal will not affect your ability to obtain treatment.  * This Authorization becomes effective upon signing and will  on (date) __________.      If no date is indicated, this Authorization will  one (1) year from the signature date.     Name: John Wills    Signature: Continuity of care   Date:     1/28/2022       PLEASE FAX REQUESTED RECORDS BACK TO: [phone]

## 2022-04-01 ENCOUNTER — HOSPITAL ENCOUNTER (OUTPATIENT)
Dept: LAB | Facility: MEDICAL CENTER | Age: 42
End: 2022-04-01
Attending: NURSE PRACTITIONER
Payer: COMMERCIAL

## 2022-04-01 DIAGNOSIS — G47.33 OBSTRUCTIVE SLEEP APNEA: ICD-10-CM

## 2022-04-01 DIAGNOSIS — E78.5 DYSLIPIDEMIA ASSOCIATED WITH TYPE 2 DIABETES MELLITUS (HCC): ICD-10-CM

## 2022-04-01 DIAGNOSIS — N18.31 STAGE 3A CHRONIC KIDNEY DISEASE: ICD-10-CM

## 2022-04-01 DIAGNOSIS — E11.8 TYPE 2 DIABETES MELLITUS WITH COMPLICATION, WITHOUT LONG-TERM CURRENT USE OF INSULIN (HCC): ICD-10-CM

## 2022-04-01 DIAGNOSIS — E11.69 DYSLIPIDEMIA ASSOCIATED WITH TYPE 2 DIABETES MELLITUS (HCC): ICD-10-CM

## 2022-04-01 DIAGNOSIS — D75.1 POLYCYTHEMIA: ICD-10-CM

## 2022-04-01 LAB
ALBUMIN SERPL BCP-MCNC: 5.2 G/DL (ref 3.2–4.9)
ALBUMIN/GLOB SERPL: 1.7 G/DL
ALP SERPL-CCNC: 64 U/L (ref 30–99)
ALT SERPL-CCNC: 38 U/L (ref 2–50)
ANION GAP SERPL CALC-SCNC: 16 MMOL/L (ref 7–16)
APPEARANCE UR: CLEAR
AST SERPL-CCNC: 32 U/L (ref 12–45)
BILIRUB SERPL-MCNC: 1 MG/DL (ref 0.1–1.5)
BILIRUB UR QL STRIP.AUTO: NEGATIVE
BUN SERPL-MCNC: 32 MG/DL (ref 8–22)
CALCIUM SERPL-MCNC: 10.5 MG/DL (ref 8.5–10.5)
CHLORIDE SERPL-SCNC: 98 MMOL/L (ref 96–112)
CHOLEST SERPL-MCNC: 169 MG/DL (ref 100–199)
CO2 SERPL-SCNC: 22 MMOL/L (ref 20–33)
COLOR UR: YELLOW
CREAT SERPL-MCNC: 1.65 MG/DL (ref 0.5–1.4)
CREAT UR-MCNC: 72.46 MG/DL
ERYTHROCYTE [DISTWIDTH] IN BLOOD BY AUTOMATED COUNT: 43.8 FL (ref 35.9–50)
GFR SERPLBLD CREATININE-BSD FMLA CKD-EPI: 53 ML/MIN/1.73 M 2
GLOBULIN SER CALC-MCNC: 3 G/DL (ref 1.9–3.5)
GLUCOSE SERPL-MCNC: 107 MG/DL (ref 65–99)
GLUCOSE UR STRIP.AUTO-MCNC: >=1000 MG/DL
HCT VFR BLD AUTO: 57.7 % (ref 42–52)
HDLC SERPL-MCNC: 37 MG/DL
HGB BLD-MCNC: 18.5 G/DL (ref 14–18)
KETONES UR STRIP.AUTO-MCNC: NEGATIVE MG/DL
LDLC SERPL CALC-MCNC: 58 MG/DL
LEUKOCYTE ESTERASE UR QL STRIP.AUTO: NEGATIVE
MCH RBC QN AUTO: 29.6 PG (ref 27–33)
MCHC RBC AUTO-ENTMCNC: 32.1 G/DL (ref 33.7–35.3)
MCV RBC AUTO: 92.5 FL (ref 81.4–97.8)
MICRO URNS: ABNORMAL
MICROALBUMIN UR-MCNC: 1.4 MG/DL
MICROALBUMIN/CREAT UR: 19 MG/G (ref 0–30)
NITRITE UR QL STRIP.AUTO: NEGATIVE
PH UR STRIP.AUTO: 5.5 [PH] (ref 5–8)
PLATELET # BLD AUTO: 178 K/UL (ref 164–446)
PMV BLD AUTO: 10.3 FL (ref 9–12.9)
POTASSIUM SERPL-SCNC: 4.5 MMOL/L (ref 3.6–5.5)
PROT SERPL-MCNC: 8.2 G/DL (ref 6–8.2)
PROT UR QL STRIP: NEGATIVE MG/DL
RBC # BLD AUTO: 6.24 M/UL (ref 4.7–6.1)
RBC UR QL AUTO: NEGATIVE
SODIUM SERPL-SCNC: 136 MMOL/L (ref 135–145)
SP GR UR STRIP.AUTO: 1.02
TRIGL SERPL-MCNC: 368 MG/DL (ref 0–149)
UROBILINOGEN UR STRIP.AUTO-MCNC: 0.2 MG/DL
WBC # BLD AUTO: 6.8 K/UL (ref 4.8–10.8)

## 2022-04-01 PROCEDURE — 80053 COMPREHEN METABOLIC PANEL: CPT

## 2022-04-01 PROCEDURE — 82043 UR ALBUMIN QUANTITATIVE: CPT

## 2022-04-01 PROCEDURE — 82570 ASSAY OF URINE CREATININE: CPT

## 2022-04-01 PROCEDURE — 80061 LIPID PANEL: CPT

## 2022-04-01 PROCEDURE — 81003 URINALYSIS AUTO W/O SCOPE: CPT

## 2022-04-01 PROCEDURE — 36415 COLL VENOUS BLD VENIPUNCTURE: CPT

## 2022-04-01 PROCEDURE — 85027 COMPLETE CBC AUTOMATED: CPT

## 2022-04-25 ENCOUNTER — OFFICE VISIT (OUTPATIENT)
Dept: CARDIOLOGY | Facility: MEDICAL CENTER | Age: 42
End: 2022-04-25
Payer: COMMERCIAL

## 2022-04-25 VITALS
SYSTOLIC BLOOD PRESSURE: 110 MMHG | RESPIRATION RATE: 16 BRPM | WEIGHT: 266 LBS | DIASTOLIC BLOOD PRESSURE: 70 MMHG | HEART RATE: 73 BPM | BODY MASS INDEX: 44.32 KG/M2 | HEIGHT: 65 IN | OXYGEN SATURATION: 97 %

## 2022-04-25 DIAGNOSIS — I50.20 ACC/AHA STAGE C SYSTOLIC HEART FAILURE (HCC): ICD-10-CM

## 2022-04-25 DIAGNOSIS — Z79.899 HIGH RISK MEDICATION USE: ICD-10-CM

## 2022-04-25 DIAGNOSIS — I50.9 HEART FAILURE, NYHA CLASS 1 (HCC): ICD-10-CM

## 2022-04-25 DIAGNOSIS — E78.5 DYSLIPIDEMIA ASSOCIATED WITH TYPE 2 DIABETES MELLITUS (HCC): ICD-10-CM

## 2022-04-25 DIAGNOSIS — I10 HTN (HYPERTENSION), MALIGNANT: ICD-10-CM

## 2022-04-25 DIAGNOSIS — E11.69 DYSLIPIDEMIA ASSOCIATED WITH TYPE 2 DIABETES MELLITUS (HCC): ICD-10-CM

## 2022-04-25 DIAGNOSIS — G47.33 OBSTRUCTIVE SLEEP APNEA SYNDROME: ICD-10-CM

## 2022-04-25 PROCEDURE — 99214 OFFICE O/P EST MOD 30 MIN: CPT | Performed by: NURSE PRACTITIONER

## 2022-04-25 ASSESSMENT — ENCOUNTER SYMPTOMS
PALPITATIONS: 0
COUGH: 0
DIZZINESS: 0
ORTHOPNEA: 0
PND: 0
SHORTNESS OF BREATH: 0
MYALGIAS: 0
FEVER: 0
ABDOMINAL PAIN: 0
CLAUDICATION: 0

## 2022-04-25 ASSESSMENT — FIBROSIS 4 INDEX: FIB4 SCORE: 1.22

## 2022-04-25 NOTE — PROGRESS NOTES
Chief Complaint   Patient presents with   • Congestive Heart Failure     F/V DX: ACC/AHA stage C systolic heart failure (HCC)   • HTN (Controlled)       Subjective:   John Wills is a 42 y.o. male who is following up on his heart failure.    Patient of the heart failure clinic.  He was last seen in clinic on 10/25/2021.  During that visit, spironolactone was increased to 25 mg daily.  Patient reports no problems with the dose increase.    Patient mentions he recently had lab work done by his PCP and noticed worsening of his kidney function.    Patient otherwise reports feeling well.  He denies chest pain, palpitations, orthopnea, PND, edema, shortness of breath or dizziness/lightheadedness.    Home weights remain stable around 250-255 lbs.     Additonally, patient has the following medical problems:    -Diabetes: Controlled, current A1c 6.2, recently taken off Metformin and pt is currently tying to diet control    -Sleep apnea: Using CPAP regularly    -Obesity    -Hypertension    -Dyslipidemia    Past Medical History:   Diagnosis Date   • Allergy    • Apnea, sleep    • Back pain    • CHF (congestive heart failure) ([facility])    • Chickenpox    • Constipation    • Cough    • Daytime sleepiness    • Diarrhea    • Difficulty breathing    • Dizziness    • Elevated troponin I level 8/20/2019   • Fatigue    • Frequent headaches    • Frequent urination    • Gasping for breath    • GERD (gastroesophageal reflux disease)    • Hearing difficulty    • Hypertension    • Morning headache    • Obesity    • Painful joint    • Polycythemia 8/20/2019   • Rhinitis    • Shortness of breath    • Sleep apnea    • Snoring    • Sore muscles    • Sweat, sweating, excessive    • Swelling of lower extremity    • Uncontrolled type 2 diabetes mellitus (HCC) 8/23/2019   • Weakness    • Wheezing      Past Surgical History:   Procedure Laterality Date   • TYMPANOMASTOIDECTOMY Left 2000     Family History   Problem Relation Age of Onset   •  Hypertension Maternal Grandmother    • Sleep Apnea Father    • Hypertension Father    • Other Sister         gastroparesis     Social History     Socioeconomic History   • Marital status:      Spouse name: Not on file   • Number of children: Not on file   • Years of education: Not on file   • Highest education level: Some college, no degree   Occupational History   • Not on file   Tobacco Use   • Smoking status: Current Every Day Smoker     Packs/day: 0.50     Years: 10.00     Pack years: 5.00     Types: Cigarettes     Start date: 3/16/1996     Last attempt to quit: 2008     Years since quittin.4   • Smokeless tobacco: Never Used   • Tobacco comment: Quit full packs in . Smokes 1 cigarette per day currently   Vaping Use   • Vaping Use: Never used   Substance and Sexual Activity   • Alcohol use: Never   • Drug use: Never   • Sexual activity: Not on file   Other Topics Concern   • Not on file   Social History Narrative   • Not on file     Social Determinants of Health     Financial Resource Strain: Low Risk    • Difficulty of Paying Living Expenses: Not hard at all   Food Insecurity: No Food Insecurity   • Worried About Running Out of Food in the Last Year: Never true   • Ran Out of Food in the Last Year: Never true   Transportation Needs: No Transportation Needs   • Lack of Transportation (Medical): No   • Lack of Transportation (Non-Medical): No   Physical Activity: Unknown   • Days of Exercise per Week: 5 days   • Minutes of Exercise per Session: Patient refused   Stress: No Stress Concern Present   • Feeling of Stress : Not at all   Social Connections: Socially Isolated   • Frequency of Communication with Friends and Family: Once a week   • Frequency of Social Gatherings with Friends and Family: Once a week   • Attends Yazdanism Services: Never   • Active Member of Clubs or Organizations: No   • Attends Club or Organization Meetings: Never   • Marital Status:    Intimate Partner  "Violence: Not on file   Housing Stability: Low Risk    • Unable to Pay for Housing in the Last Year: No   • Number of Places Lived in the Last Year: 1   • Unstable Housing in the Last Year: No     Allergies   Allergen Reactions   • Sulfa Drugs Rash     Heat rash      Outpatient Encounter Medications as of 4/25/2022   Medication Sig Dispense Refill   • Empagliflozin 10 MG Tab Take 10 mg by mouth every day. 90 Tablet 1   • rosuvastatin (CRESTOR) 20 MG Tab Take 1 Tablet by mouth every evening. 90 Tablet 3   • carvedilol (COREG) 25 MG Tab TAKE 2 TABLETS BY MOUTH TWICE DAILY WITH MEALS 360 tablet 3   • [DISCONTINUED] spironolactone (ALDACTONE) 25 MG Tab Take 1 Tablet by mouth every day. 90 Tablet 3     No facility-administered encounter medications on file as of 4/25/2022.     Review of Systems   Constitutional: Negative for fever and malaise/fatigue.   Respiratory: Negative for cough and shortness of breath.    Cardiovascular: Negative for chest pain, palpitations, orthopnea, claudication, leg swelling and PND.   Gastrointestinal: Negative for abdominal pain.   Musculoskeletal: Negative for myalgias.   Neurological: Negative for dizziness.   All other systems reviewed and are negative.       Objective:   /70 (BP Location: Left arm, Patient Position: Sitting, BP Cuff Size: Adult)   Pulse 73   Resp 16   Ht 1.651 m (5' 5\")   Wt 121 kg (266 lb)   SpO2 97%   BMI 44.26 kg/m²     Physical Exam  Vitals reviewed.   Constitutional:       Appearance: He is well-developed.   HENT:      Head: Normocephalic and atraumatic.   Eyes:      Pupils: Pupils are equal, round, and reactive to light.   Neck:      Vascular: No JVD.   Cardiovascular:      Rate and Rhythm: Normal rate and regular rhythm.      Heart sounds: Normal heart sounds.   Pulmonary:      Effort: Pulmonary effort is normal. No respiratory distress.      Breath sounds: Normal breath sounds. No wheezing or rales.   Abdominal:      General: Bowel sounds are normal. "      Palpations: Abdomen is soft.   Musculoskeletal:      Cervical back: Normal range of motion and neck supple.      Right lower leg: No edema.      Left lower leg: No edema.   Skin:     General: Skin is warm and dry.   Neurological:      Mental Status: He is alert and oriented to person, place, and time.   Psychiatric:         Behavior: Behavior normal.       Lab Results   Component Value Date/Time    CHOLSTRLTOT 169 04/01/2022 06:10 AM    LDL 58 04/01/2022 06:10 AM    HDL 37 (A) 04/01/2022 06:10 AM    TRIGLYCERIDE 368 (H) 04/01/2022 06:10 AM       Lab Results   Component Value Date/Time    SODIUM 136 04/01/2022 06:10 AM    POTASSIUM 4.5 04/01/2022 06:10 AM    CHLORIDE 98 04/01/2022 06:10 AM    CO2 22 04/01/2022 06:10 AM    GLUCOSE 107 (H) 04/01/2022 06:10 AM    BUN 32 (H) 04/01/2022 06:10 AM    CREATININE 1.65 (H) 04/01/2022 06:10 AM     Lab Results   Component Value Date/Time    ALKPHOSPHAT 64 04/01/2022 06:10 AM    ASTSGOT 32 04/01/2022 06:10 AM    ALTSGPT 38 04/01/2022 06:10 AM    TBILIRUBIN 1.0 04/01/2022 06:10 AM      Results for KEERTHI CAPMOS (MRN [id number]) as of 2/25/2020 17:02   Ref. Range 2/12/2020 10:33   Glycohemoglobin Latest Ref Range: 0.0 - 5.6 % 5.3       Transthoracic Echo Report 8/21/2019  No prior study is available for comparison.   Normal left ventricular chamber size.  Left ventricular ejection fraction is visually estimated to be 40%.  Normal inferior vena cava size and inspiratory collapse.  Poor valvular visualization.     Transthoracic Echo Report 11/4/2019  Compared to the images of the prior study done 08/21/2019, the EF has normalized.  Moderate concentric left ventricular hypertrophy.  Left ventricular ejection fraction is visually estimated to be 55%.  Unable to estimate pulmonary artery pressure due to an inadequate tricuspid regurgitant jet.    Assessment:     1. ACC/AHA stage C systolic heart failure (HCC)  Basic Metabolic Panel   2. Heart failure, NYHA class 1 (HCC)  Basic  Metabolic Panel   3. High risk medication use  Basic Metabolic Panel   4. Obstructive sleep apnea syndrome     5. Dyslipidemia associated with type 2 diabetes mellitus (HCC)     6. HTN (hypertension), malignant         Medical Decision Making:  Today's Assessment / Status / Plan:   1.  Hypertension: Stable  -Continue carvedilol 50 mg in AM and try taking 25 mg in PM  -Okay to stop spironolactone at this time due to kidney function  -Patient to obtain a BMP in 1 month to follow kidney function  -Monitor and log Blood pressures at home, 2 hours after medications. Call office or RTC if BP increasing or >180/100 or if symptoms of elevated blood pressure present. Reviewed s/sx of stroke and heart attack. Patient to go to ER or call 911 if present.   -If kidney function does not change, may be component of diabetes.  -Can restart spironolactone if kidney function remains unchanged if BP increases    2.  HFrEF, Stage C, Class 1, LVEF 55% improved from 40%: Based on physical examination findings, patient is euvolemic. No JVD, lungs are clear to auscultation, no pitting edema in bilateral lower extremities, no ascites.  -Heart failure due to hypertension, comorbid obesity and sleep apnea  -Currently not on diuretic  -Continue carvedilol 50 in AM and 25 in PM  -Stop spironolactone at this time due to some STEPHEN/CKD  -not on lisinopril due to CKD/STEPHEN  -BMP in 1 month  -No indication for ICD due to improvement of LVEF  -Reinforced s/sx of worsening heart failure with patient and weight monitoring. Pt verbalizes understanding. Pt to call office or RTC if present.     3.  Sleep apnea:  -Using CPAP, tolerating well   -Continue follow-up with sleep medicine    4.  Dyslipidemia: Last LDL 58 on 4/1/2022  -Continue rosuvastatin  20 mg daily    5.  Diabetes:  -Diet controlled, PCP following  -Recent A1c 6.7    BMP in 1 month.    FU in clinic in 6 months. Sooner if needed.    Patient verbalizes understanding and agrees with the plan of  care.     PLEASE NOTE: This Note was created using voice recognition Software. I have made every reasonable attempt to correct obvious errors, but I expect that there are errors of grammar and possibly content that I did not discover before finalizing the note

## 2022-05-24 ENCOUNTER — OFFICE VISIT (OUTPATIENT)
Dept: MEDICAL GROUP | Facility: PHYSICIAN GROUP | Age: 42
End: 2022-05-24
Payer: COMMERCIAL

## 2022-05-24 VITALS
DIASTOLIC BLOOD PRESSURE: 88 MMHG | WEIGHT: 263.6 LBS | HEART RATE: 88 BPM | BODY MASS INDEX: 43.92 KG/M2 | OXYGEN SATURATION: 94 % | HEIGHT: 65 IN | SYSTOLIC BLOOD PRESSURE: 116 MMHG | TEMPERATURE: 98.8 F

## 2022-05-24 DIAGNOSIS — I11.0 HYPERTENSIVE HEART DISEASE WITH CHRONIC SYSTOLIC CONGESTIVE HEART FAILURE (HCC): ICD-10-CM

## 2022-05-24 DIAGNOSIS — E78.5 DYSLIPIDEMIA ASSOCIATED WITH TYPE 2 DIABETES MELLITUS (HCC): ICD-10-CM

## 2022-05-24 DIAGNOSIS — E11.8 TYPE 2 DIABETES MELLITUS WITH COMPLICATION, WITHOUT LONG-TERM CURRENT USE OF INSULIN (HCC): ICD-10-CM

## 2022-05-24 DIAGNOSIS — N18.31 STAGE 3A CHRONIC KIDNEY DISEASE: ICD-10-CM

## 2022-05-24 DIAGNOSIS — E11.69 DYSLIPIDEMIA ASSOCIATED WITH TYPE 2 DIABETES MELLITUS (HCC): ICD-10-CM

## 2022-05-24 DIAGNOSIS — G47.33 OBSTRUCTIVE SLEEP APNEA: Chronic | ICD-10-CM

## 2022-05-24 DIAGNOSIS — I50.22 HYPERTENSIVE HEART DISEASE WITH CHRONIC SYSTOLIC CONGESTIVE HEART FAILURE (HCC): ICD-10-CM

## 2022-05-24 PROBLEM — U07.1 COVID-19: Status: RESOLVED | Noted: 2021-09-09 | Resolved: 2022-05-24

## 2022-05-24 PROCEDURE — 99214 OFFICE O/P EST MOD 30 MIN: CPT | Performed by: NURSE PRACTITIONER

## 2022-05-24 RX ORDER — ROSUVASTATIN CALCIUM 40 MG/1
40 TABLET, COATED ORAL DAILY
Qty: 90 TABLET | Refills: 3 | Status: SHIPPED | OUTPATIENT
Start: 2022-05-24 | End: 2023-04-29

## 2022-05-24 ASSESSMENT — PATIENT HEALTH QUESTIONNAIRE - PHQ9: CLINICAL INTERPRETATION OF PHQ2 SCORE: 0

## 2022-05-24 ASSESSMENT — FIBROSIS 4 INDEX: FIB4 SCORE: 1.22

## 2022-05-25 NOTE — ASSESSMENT & PLAN NOTE
This is a chronic condition.  Patient does follow with pulmonology.  He states it has been a while since he has seen them.  He does use his CPAP consistently during the week, however, he reports that he will frequently fall asleep on the weekends without his CPAP on.  Patient does have evidence of increased hemoglobin on his most recent CBC.  Discussed with patient that this may be due to his sleep apnea and he would benefit from following up with pulmonology.    Continue CPAP use and follow-up with pulmonology.

## 2022-05-25 NOTE — PROGRESS NOTES
"  CC: follow up for diabetes                                                                                                                                    HPI:   John presents today with the following.    Problem   Stage 3 Chronic Kidney Disease (Hcc)   Obstructive Sleep Apnea   Dyslipidemia Associated With Type 2 Diabetes Mellitus (Hcc)   Hypertensive Heart Disease With Chronic Systolic Congestive Heart Failure (Hcc)   Type 2 Diabetes Mellitus With Complication, Without Long-Term Current Use of Insulin (Hcc)   Covid-19 (Resolved)       Current Outpatient Medications   Medication Sig Dispense Refill   • Empagliflozin 10 MG Tab Take 1 Tablet by mouth every day. 90 Tablet 3   • rosuvastatin (CRESTOR) 40 MG tablet Take 1 Tablet by mouth every day. 90 Tablet 3   • carvedilol (COREG) 25 MG Tab Take 2 Tablets by mouth 2 times a day with meals. 360 Tablet 3     No current facility-administered medications for this visit.       Allergies as of 05/24/2022 - Reviewed 05/24/2022   Allergen Reaction Noted   • Sulfa drugs Rash 07/17/2015        ROS:  All systems negative expect as addressed in assessment and plan.     /88 (BP Location: Left arm, Patient Position: Sitting, BP Cuff Size: Adult)   Pulse 88   Temp 37.1 °C (98.8 °F) (Temporal)   Ht 1.651 m (5' 5\")   Wt 120 kg (263 lb 9.6 oz)   SpO2 94%   BMI 43.87 kg/m²     Physical Exam:  Gen:         Alert and oriented, No apparent distress.  Neck:        No Lymphadenopathy.   Lungs:     Clear to auscultation bilaterally. No wheezes, rales, or rhonchi.   CV:          Regular rate and rhythm. No murmurs, rubs or gallops.         Ext:          No clubbing, cyanosis, or peripheral edema.  Skin:  All visible skin intact without lesions.       Assessment and Plan:  42 y.o. male with the following issues.    1. Type 2 diabetes mellitus with complication, without long-term current use of insulin (HCC)     2. Dyslipidemia associated with type 2 diabetes mellitus (HCC)   "   3. Hypertensive heart disease with chronic systolic congestive heart failure (HCC)     4. Stage 3a chronic kidney disease (HCC)     5. Obstructive sleep apnea          Type 2 diabetes mellitus with complication, without long-term current use of insulin (HCC)  This is a chronic condition. Patient is stable on jardiance. His last A1c was in Jan and was 6.7%.     He denies any side effects of the medication.     Continue jardiance.     Monofilament testing with a 10 gram force: sensation intact: intact bilaterally  Visual Inspection: Feet without maceration, ulcers, fissures.  Pedal pulses: intact bilaterally    Dyslipidemia associated with type 2 diabetes mellitus (HCC)  This is a chronic stable condition.  Patient's LDL and HDL have improved, however, patient still has elevated triglycerides in the 300s.    Will increase rosuvastatin dose from 20 mg to 40 mg.        Hypertensive heart disease with chronic systolic congestive heart failure (HCC)  This is a chronic stable condition. Patient follows with cardiology. He is stable on carvedilol.     Continue carvedilol and following with cardiology.       Stage 3 chronic kidney disease (HCC)  This is a chronic stable condition. Patients GFR is stable at 53. His microabuminuria is normal.     Will monitor renal function and microalbuminuria every 6-12 months.     Obstructive sleep apnea  This is a chronic condition.  Patient does follow with pulmonology.  He states it has been a while since he has seen them.  He does use his CPAP consistently during the week, however, he reports that he will frequently fall asleep on the weekends without his CPAP on.  Patient does have evidence of increased hemoglobin on his most recent CBC.  Discussed with patient that this may be due to his sleep apnea and he would benefit from following up with pulmonology.    Continue CPAP use and follow-up with pulmonology.      Return in about 6 months (around 11/24/2022) for follow up for  diabetes.    I have placed the below orders and discussed them with an approved delegating provider.  The MA is performing the below orders under the direction of Dr. carpenter.    Please note that this dictation was created using voice recognition software. I have worked with consultants from the vendor as well as technical experts from [facility] to optimize the interface. I have made every reasonable attempt to correct obvious errors, but I expect that there are errors of grammar and possibly content that I did not discover before finalizing the note.

## 2022-05-25 NOTE — ASSESSMENT & PLAN NOTE
GYN unfilled]      The  This is a chronic stable condition. Patients GFR is stable at 53. His microabuminuria is normal.     Will monitor renal function and microalbuminuria every 6-12 months.    "Guillermina Mosqueda MD  10/18/2021

## 2022-05-25 NOTE — ASSESSMENT & PLAN NOTE
This is a chronic condition. Patient is stable on jardiance. His last A1c was in Jan and was 6.7%.     He denies any side effects of the medication.     Continue jardiance.     Monofilament testing with a 10 gram force: sensation intact: intact bilaterally  Visual Inspection: Feet without maceration, ulcers, fissures.  Pedal pulses: intact bilaterally

## 2022-05-25 NOTE — ASSESSMENT & PLAN NOTE
This is a chronic stable condition.  Patient's LDL and HDL have improved, however, patient still has elevated triglycerides in the 300s.    Will increase rosuvastatin dose from 20 mg to 40 mg.

## 2022-05-25 NOTE — ASSESSMENT & PLAN NOTE
This is a chronic stable condition. Patient follows with cardiology. He is stable on carvedilol.     Continue carvedilol and following with cardiology.

## 2022-06-01 ENCOUNTER — HOSPITAL ENCOUNTER (OUTPATIENT)
Dept: LAB | Facility: MEDICAL CENTER | Age: 42
End: 2022-06-01
Attending: NURSE PRACTITIONER
Payer: COMMERCIAL

## 2022-06-01 DIAGNOSIS — I50.9 HEART FAILURE, NYHA CLASS 1 (HCC): ICD-10-CM

## 2022-06-01 DIAGNOSIS — I50.20 ACC/AHA STAGE C SYSTOLIC HEART FAILURE (HCC): ICD-10-CM

## 2022-06-01 DIAGNOSIS — Z79.899 HIGH RISK MEDICATION USE: ICD-10-CM

## 2022-06-01 LAB
ANION GAP SERPL CALC-SCNC: 14 MMOL/L (ref 7–16)
BUN SERPL-MCNC: 29 MG/DL (ref 8–22)
CALCIUM SERPL-MCNC: 9.1 MG/DL (ref 8.5–10.5)
CHLORIDE SERPL-SCNC: 103 MMOL/L (ref 96–112)
CO2 SERPL-SCNC: 21 MMOL/L (ref 20–33)
CREAT SERPL-MCNC: 1.67 MG/DL (ref 0.5–1.4)
GFR SERPLBLD CREATININE-BSD FMLA CKD-EPI: 52 ML/MIN/1.73 M 2
GLUCOSE SERPL-MCNC: 150 MG/DL (ref 65–99)
POTASSIUM SERPL-SCNC: 4 MMOL/L (ref 3.6–5.5)
SODIUM SERPL-SCNC: 138 MMOL/L (ref 135–145)

## 2022-06-01 PROCEDURE — 36415 COLL VENOUS BLD VENIPUNCTURE: CPT

## 2022-06-01 PROCEDURE — 80048 BASIC METABOLIC PNL TOTAL CA: CPT

## 2022-06-02 NOTE — RESULT ENCOUNTER NOTE
Kidney function remains unchanged. o changes at this time. Will continue to follow. Please have pt repeat BMP before his follow up in October.

## 2022-06-03 DIAGNOSIS — N18.31 STAGE 3A CHRONIC KIDNEY DISEASE: ICD-10-CM

## 2022-10-25 ENCOUNTER — OFFICE VISIT (OUTPATIENT)
Dept: CARDIOLOGY | Facility: MEDICAL CENTER | Age: 42
End: 2022-10-25
Payer: COMMERCIAL

## 2022-10-25 VITALS
HEIGHT: 65 IN | SYSTOLIC BLOOD PRESSURE: 122 MMHG | RESPIRATION RATE: 16 BRPM | DIASTOLIC BLOOD PRESSURE: 94 MMHG | HEART RATE: 68 BPM | OXYGEN SATURATION: 97 % | WEIGHT: 266.8 LBS | BODY MASS INDEX: 44.45 KG/M2

## 2022-10-25 DIAGNOSIS — E78.5 DYSLIPIDEMIA ASSOCIATED WITH TYPE 2 DIABETES MELLITUS (HCC): ICD-10-CM

## 2022-10-25 DIAGNOSIS — I11.0 HYPERTENSIVE HEART DISEASE WITH CHRONIC SYSTOLIC CONGESTIVE HEART FAILURE (HCC): ICD-10-CM

## 2022-10-25 DIAGNOSIS — I50.22 HYPERTENSIVE HEART DISEASE WITH CHRONIC SYSTOLIC CONGESTIVE HEART FAILURE (HCC): ICD-10-CM

## 2022-10-25 DIAGNOSIS — E11.69 DYSLIPIDEMIA ASSOCIATED WITH TYPE 2 DIABETES MELLITUS (HCC): ICD-10-CM

## 2022-10-25 DIAGNOSIS — I50.9 HEART FAILURE, NYHA CLASS 1 (HCC): ICD-10-CM

## 2022-10-25 DIAGNOSIS — Z79.899 HIGH RISK MEDICATION USE: ICD-10-CM

## 2022-10-25 DIAGNOSIS — E11.8 TYPE 2 DIABETES MELLITUS WITH COMPLICATION, WITHOUT LONG-TERM CURRENT USE OF INSULIN (HCC): ICD-10-CM

## 2022-10-25 DIAGNOSIS — I10 HTN (HYPERTENSION), MALIGNANT: ICD-10-CM

## 2022-10-25 DIAGNOSIS — I50.20 ACC/AHA STAGE C SYSTOLIC HEART FAILURE (HCC): ICD-10-CM

## 2022-10-25 DIAGNOSIS — N18.31 STAGE 3A CHRONIC KIDNEY DISEASE: ICD-10-CM

## 2022-10-25 PROCEDURE — 99214 OFFICE O/P EST MOD 30 MIN: CPT | Performed by: NURSE PRACTITIONER

## 2022-10-25 RX ORDER — AMLODIPINE BESYLATE 2.5 MG/1
2.5 TABLET ORAL DAILY
Qty: 30 TABLET | Refills: 11 | Status: SHIPPED | OUTPATIENT
Start: 2022-10-25 | End: 2022-10-26

## 2022-10-25 ASSESSMENT — ENCOUNTER SYMPTOMS
COUGH: 0
PND: 0
MYALGIAS: 0
FEVER: 0
ORTHOPNEA: 0
DIZZINESS: 0
SHORTNESS OF BREATH: 0
ABDOMINAL PAIN: 0
CLAUDICATION: 0
PALPITATIONS: 0

## 2022-10-25 ASSESSMENT — FIBROSIS 4 INDEX: FIB4 SCORE: 1.22

## 2022-10-25 NOTE — PROGRESS NOTES
Chief Complaint   Patient presents with    CHF (Systolic)     F/V Dx: ACC/AHA stage C systolic heart failure (HCC)       Subjective:   John Wills is a 42 y.o. male who is following up on his heart failure.    Patient of the heart failure clinic.  He was last seen in clinic on 4/25/2022.  During that visit, patient was recommended to try to add 25 mg of carvedilol in the evening.  He was also recommended to get follow-up lab testing after 1 month.    Patient reports she tried several weeks of the 25 mg of carvedilol in the evening and continues to report dizziness.  He reports he discontinued the evening dose and only takes carvedilol 50 mg in the morning.    Patient feels well, denies chest pain, shortness of breath, palpitations, dizziness/lightheadedness, orthopnea, PND or Edema.      He reports his home weights are ranging between 250-260 pounds.    Additonally, patient has the following medical problems:    -Diabetes: Controlled, current A1c 6.2, recently taken off Metformin and pt is currently tying to diet control    -Sleep apnea: Using CPAP regularly    -Obesity    -Hypertension    -Dyslipidemia    Past Medical History:   Diagnosis Date    Allergy     Apnea, sleep     Back pain     CHF (congestive heart failure) (HCC)     Chickenpox     Constipation     Cough     COVID-19 9/9/2021    Daytime sleepiness     Diarrhea     Difficulty breathing     Dizziness     Elevated troponin I level 8/20/2019    Fatigue     Frequent headaches     Frequent urination     Gasping for breath     GERD (gastroesophageal reflux disease)     Hearing difficulty     Hypertension     Morning headache     Obesity     Painful joint     Polycythemia 8/20/2019    Rhinitis     Shortness of breath     Sleep apnea     Snoring     Sore muscles     Sweat, sweating, excessive     Swelling of lower extremity     Uncontrolled type 2 diabetes mellitus 8/23/2019    Weakness     Wheezing      Past Surgical History:   Procedure Laterality Date     TYMPANOMASTOIDECTOMY Left 2000     Family History   Problem Relation Age of Onset    Hypertension Maternal Grandmother     Sleep Apnea Father     Hypertension Father     Other Sister         gastroparesis     Social History     Socioeconomic History    Marital status:      Spouse name: Not on file    Number of children: Not on file    Years of education: Not on file    Highest education level: Some college, no degree   Occupational History    Not on file   Tobacco Use    Smoking status: Every Day     Packs/day: 0.50     Years: 10.00     Pack years: 5.00     Types: Cigarettes     Start date: 3/16/1996    Smokeless tobacco: Never   Vaping Use    Vaping Use: Never used   Substance and Sexual Activity    Alcohol use: Never    Drug use: Never    Sexual activity: Not on file   Other Topics Concern    Not on file   Social History Narrative    Not on file     Social Determinants of Health     Financial Resource Strain: Not on file   Food Insecurity: Not on file   Transportation Needs: Not on file   Physical Activity: Not on file   Stress: Not on file   Social Connections: Not on file   Intimate Partner Violence: Not on file   Housing Stability: Not on file     Allergies   Allergen Reactions    Sulfa Drugs Rash     Heat rash      Outpatient Encounter Medications as of 10/25/2022   Medication Sig Dispense Refill    Empagliflozin 10 MG Tab Take 1 Tablet by mouth every day. 90 Tablet 3    rosuvastatin (CRESTOR) 40 MG tablet Take 1 Tablet by mouth every day. 90 Tablet 3    carvedilol (COREG) 25 MG Tab Take 2 Tablets by mouth 2 times a day with meals. (Patient taking differently: Take 50 mg by mouth every day.) 360 Tablet 3     No facility-administered encounter medications on file as of 10/25/2022.     Review of Systems   Constitutional:  Negative for fever and malaise/fatigue.   Respiratory:  Negative for cough and shortness of breath.    Cardiovascular:  Negative for chest pain, palpitations, orthopnea, claudication,  "leg swelling and PND.   Gastrointestinal:  Negative for abdominal pain.   Musculoskeletal:  Negative for myalgias.   Neurological:  Negative for dizziness.   All other systems reviewed and are negative.     Objective:   BP (!) 122/94 (BP Location: Left arm, Patient Position: Sitting, BP Cuff Size: Adult)   Pulse 68   Resp 16   Ht 1.651 m (5' 5\")   Wt 121 kg (266 lb 12.8 oz)   SpO2 97%   BMI 44.40 kg/m²     Physical Exam  Vitals reviewed.   Constitutional:       Appearance: He is well-developed.   HENT:      Head: Normocephalic and atraumatic.   Eyes:      Pupils: Pupils are equal, round, and reactive to light.   Neck:      Vascular: No JVD.   Cardiovascular:      Rate and Rhythm: Normal rate and regular rhythm.      Heart sounds: Normal heart sounds.   Pulmonary:      Effort: Pulmonary effort is normal. No respiratory distress.      Breath sounds: Normal breath sounds. No wheezing or rales.   Abdominal:      General: Bowel sounds are normal.      Palpations: Abdomen is soft.   Musculoskeletal:      Cervical back: Normal range of motion and neck supple.      Right lower leg: No edema.      Left lower leg: No edema.   Skin:     General: Skin is warm and dry.   Neurological:      Mental Status: He is alert and oriented to person, place, and time.   Psychiatric:         Behavior: Behavior normal.     Lab Results   Component Value Date/Time    CHOLSTRLTOT 169 04/01/2022 06:10 AM    LDL 58 04/01/2022 06:10 AM    HDL 37 (A) 04/01/2022 06:10 AM    TRIGLYCERIDE 368 (H) 04/01/2022 06:10 AM       Lab Results   Component Value Date/Time    SODIUM 138 06/01/2022 06:09 AM    POTASSIUM 4.0 06/01/2022 06:09 AM    CHLORIDE 103 06/01/2022 06:09 AM    CO2 21 06/01/2022 06:09 AM    GLUCOSE 150 (H) 06/01/2022 06:09 AM    BUN 29 (H) 06/01/2022 06:09 AM    CREATININE 1.67 (H) 06/01/2022 06:09 AM     Lab Results   Component Value Date/Time    ALKPHOSPHAT 64 04/01/2022 06:10 AM    ASTSGOT 32 04/01/2022 06:10 AM    ALTSGPT 38 " 04/01/2022 06:10 AM    TBILIRUBIN 1.0 04/01/2022 06:10 AM      Results for KEERTHI CAMPOS (MRN [id number]) as of 2/25/2020 17:02   Ref. Range 2/12/2020 10:33   Glycohemoglobin Latest Ref Range: 0.0 - 5.6 % 5.3       Transthoracic Echo Report 8/21/2019  No prior study is available for comparison.   Normal left ventricular chamber size.  Left ventricular ejection fraction is visually estimated to be 40%.  Normal inferior vena cava size and inspiratory collapse.  Poor valvular visualization.     Transthoracic Echo Report 11/4/2019  Compared to the images of the prior study done 08/21/2019, the EF has normalized.  Moderate concentric left ventricular hypertrophy.  Left ventricular ejection fraction is visually estimated to be 55%.  Unable to estimate pulmonary artery pressure due to an inadequate tricuspid regurgitant jet.    Assessment:     1. ACC/AHA stage C systolic heart failure (HCC)  HEMOGLOBIN A1C (Glycohemoglobin GHB Total/A1C with MBG Estimate)    Comp Metabolic Panel    Lipid Profile      2. High risk medication use  HEMOGLOBIN A1C (Glycohemoglobin GHB Total/A1C with MBG Estimate)    Comp Metabolic Panel    Lipid Profile      3. HTN (hypertension), malignant  HEMOGLOBIN A1C (Glycohemoglobin GHB Total/A1C with MBG Estimate)    Comp Metabolic Panel    Lipid Profile      4. Type 2 diabetes mellitus with complication, without long-term current use of insulin (HCC)  HEMOGLOBIN A1C (Glycohemoglobin GHB Total/A1C with MBG Estimate)    Comp Metabolic Panel    Lipid Profile      5. Dyslipidemia associated with type 2 diabetes mellitus (HCC)  HEMOGLOBIN A1C (Glycohemoglobin GHB Total/A1C with MBG Estimate)    Lipid Profile      6. Heart failure, NYHA class 1 (HCC)        7. Stage 3a chronic kidney disease (HCC)            Medical Decision Making:  Today's Assessment / Status / Plan:   1.  Hypertension: Stable, but does have elevated diastolic pressure  -Discussed starting amlodipine 2.5 mg daily and he is  agreeable  -Continue carvedilol 50 mg in AM he is not able to tolerate p.m. dose.  -Monitor and log Blood pressures at home, 2 hours after medications. Call office or RTC if BP increasing or >180/100 or if symptoms of elevated blood pressure present. Reviewed s/sx of stroke and heart attack. Patient to go to ER or call 911 if present.   -If kidney function does not change, may be component of diabetes.  -Can restart spironolactone if kidney function remains unchanged if BP increases    2.  HFrEF, Stage C, Class 1, LVEF 55% improved from 40%: Based on physical examination findings, patient is euvolemic. No JVD, lungs are clear to auscultation, no pitting edema in bilateral lower extremities, no ascites.  -Heart failure due to hypertension, comorbid obesity and sleep apnea  -Currently not on diuretic  -Continue carvedilol 50 in AM, he is not able to take p.m. dose  -Continue empagliflozin 10 mg daily  -Stop spironolactone at this time due to some STEPHEN/CKD  -not on lisinopril due to CKD/STEPHEN  -No indication for ICD due to improvement of LVEF  -Reinforced s/sx of worsening heart failure with patient and weight monitoring. Pt verbalizes understanding. Pt to call office or RTC if present.     3.  Sleep apnea:  -Using CPAP, tolerating well, he does admit he is not using consistently, discussed importance of using every day  -Continue follow-up with sleep medicine    4.  Dyslipidemia: Last LDL 58 on 4/1/2022  -Continue rosuvastatin  20 mg daily    5.  Diabetes:  -Currently on empagliflozin 10 mg daily  -Last A1c 6.7 on 1/27/2022    6.  CKD, stage III:  -Continue to follow kidney function    CMP, lipid panel and A1c due in the next month prior to his appointment with PCP    FU in clinic in 6 months. Sooner if needed.    Patient verbalizes understanding and agrees with the plan of care.     PLEASE NOTE: This Note was created using voice recognition Software. I have made every reasonable attempt to correct obvious errors, but I  expect that there are errors of grammar and possibly content that I did not discover before finalizing the note

## 2022-10-26 RX ORDER — AMLODIPINE BESYLATE 2.5 MG/1
2.5 TABLET ORAL DAILY
Qty: 90 TABLET | Refills: 3 | Status: SHIPPED | OUTPATIENT
Start: 2022-10-26 | End: 2023-10-24

## 2022-10-26 NOTE — TELEPHONE ENCOUNTER
Is the patient due for a refill? Yes    Was the patient seen the past year? Yes    Date of last office visit: 10/25/22    Does the patient have an upcoming appointment?  No   If yes, When?     Provider to refill:MAXWELL    Does the patients insurance require a 100 day supply?  No

## 2022-12-14 ENCOUNTER — OFFICE VISIT (OUTPATIENT)
Dept: MEDICAL GROUP | Facility: PHYSICIAN GROUP | Age: 42
End: 2022-12-14
Payer: COMMERCIAL

## 2022-12-14 VITALS
HEART RATE: 88 BPM | SYSTOLIC BLOOD PRESSURE: 126 MMHG | TEMPERATURE: 98.2 F | DIASTOLIC BLOOD PRESSURE: 84 MMHG | BODY MASS INDEX: 44.32 KG/M2 | HEIGHT: 65 IN | WEIGHT: 266 LBS | OXYGEN SATURATION: 93 % | RESPIRATION RATE: 16 BRPM

## 2022-12-14 DIAGNOSIS — N52.9 ERECTILE DYSFUNCTION, UNSPECIFIED ERECTILE DYSFUNCTION TYPE: ICD-10-CM

## 2022-12-14 DIAGNOSIS — I10 HYPERTENSION, UNSPECIFIED TYPE: ICD-10-CM

## 2022-12-14 DIAGNOSIS — E11.69 DYSLIPIDEMIA ASSOCIATED WITH TYPE 2 DIABETES MELLITUS (HCC): ICD-10-CM

## 2022-12-14 DIAGNOSIS — E78.5 DYSLIPIDEMIA ASSOCIATED WITH TYPE 2 DIABETES MELLITUS (HCC): ICD-10-CM

## 2022-12-14 DIAGNOSIS — N18.31 STAGE 3A CHRONIC KIDNEY DISEASE: ICD-10-CM

## 2022-12-14 DIAGNOSIS — E11.8 TYPE 2 DIABETES MELLITUS WITH COMPLICATION, WITHOUT LONG-TERM CURRENT USE OF INSULIN (HCC): ICD-10-CM

## 2022-12-14 LAB
HBA1C MFR BLD: 6.6 % (ref 0–5.6)
INT CON NEG: NEGATIVE
INT CON POS: POSITIVE

## 2022-12-14 PROCEDURE — 99214 OFFICE O/P EST MOD 30 MIN: CPT | Performed by: NURSE PRACTITIONER

## 2022-12-14 PROCEDURE — 83036 HEMOGLOBIN GLYCOSYLATED A1C: CPT | Performed by: NURSE PRACTITIONER

## 2022-12-14 RX ORDER — SILDENAFIL 100 MG/1
100 TABLET, FILM COATED ORAL
Qty: 10 TABLET | Refills: 3 | Status: SHIPPED | OUTPATIENT
Start: 2022-12-14

## 2022-12-14 RX ORDER — CARVEDILOL 25 MG/1
50 TABLET ORAL DAILY
Qty: 180 TABLET | Refills: 3
Start: 2022-12-14 | End: 2023-11-07 | Stop reason: SDUPTHER

## 2022-12-14 ASSESSMENT — FIBROSIS 4 INDEX: FIB4 SCORE: 1.22

## 2022-12-14 NOTE — ASSESSMENT & PLAN NOTE
This is a chronic condition. Patient is stable on jardiance 10mg. He is tolerating this medication well. No issue with dysuria or perineal infection.     A1c in office is 6.6%.     Patient to continue jardiance 10mg.

## 2022-12-14 NOTE — PROGRESS NOTES
"  Chief Complaint   Patient presents with    Diabetes                                                                                                                                     HPI:   John presents today with the following.    Problem   Erectile Dysfunction   Dyslipidemia Associated With Type 2 Diabetes Mellitus (Hcc)   Type 2 Diabetes Mellitus With Complication, Without Long-Term Current Use of Insulin (Hcc)       Current Outpatient Medications   Medication Sig Dispense Refill    carvedilol (COREG) 25 MG Tab Take 2 Tablets by mouth every day. 180 Tablet 3    sildenafil citrate (VIAGRA) 100 MG tablet Take 1 Tablet by mouth 1 time a day as needed for Erectile Dysfunction. 10 Tablet 3    amLODIPine (NORVASC) 2.5 MG Tab TAKE 1 TABLET BY MOUTH EVERY DAY 90 Tablet 3    Empagliflozin 10 MG Tab Take 1 Tablet by mouth every day. 90 Tablet 3    rosuvastatin (CRESTOR) 40 MG tablet Take 1 Tablet by mouth every day. 90 Tablet 3     No current facility-administered medications for this visit.       Allergies as of 12/14/2022 - Reviewed 12/14/2022   Allergen Reaction Noted    Sulfa drugs Rash 07/17/2015        ROS:  All systems negative expect as addressed in assessment and plan.     /84 (BP Location: Left arm, Patient Position: Sitting, BP Cuff Size: Adult)   Pulse 88   Temp 36.8 °C (98.2 °F) (Temporal)   Resp 16   Ht 1.651 m (5' 5\")   Wt 121 kg (266 lb)   SpO2 93%   BMI 44.26 kg/m²       Physical Exam  Vitals reviewed.   Constitutional:       Appearance: Normal appearance.   HENT:      Head: Normocephalic and atraumatic.      Mouth/Throat:      Mouth: Mucous membranes are moist.   Eyes:      Extraocular Movements: Extraocular movements intact.      Conjunctiva/sclera: Conjunctivae normal.   Pulmonary:      Effort: Pulmonary effort is normal.   Musculoskeletal:         General: Normal range of motion.      Cervical back: Normal range of motion.   Skin:     General: Skin is warm and dry.   Neurological:    "   General: No focal deficit present.      Mental Status: He is alert and oriented to person, place, and time.   Psychiatric:         Mood and Affect: Mood normal.         Behavior: Behavior normal.         Thought Content: Thought content normal.     Assessment and Plan:  42 y.o. male with the following issues.    1. Type 2 diabetes mellitus with complication, without long-term current use of insulin ([facility])  POCT Hemoglobin A1C      2. Hypertension, unspecified type  carvedilol (COREG) 25 MG Tab      3. Dyslipidemia associated with type 2 diabetes mellitus ([facility])  Lipid Profile      4. Stage 3a chronic kidney disease ([facility])  Renal Function Panel      5. Erectile dysfunction, unspecified erectile dysfunction type             Type 2 diabetes mellitus with complication, without long-term current use of insulin ([facility])  This is a chronic condition. Patient is stable on jardiance 10mg. He is tolerating this medication well. No issue with dysuria or perineal infection.     A1c in office is 6.6%.     Patient to continue jardiance 10mg.     Dyslipidemia associated with type 2 diabetes mellitus ([facility])  This is a chronic stable condition. Patient is stable on rosuvastatin. His dose was increased to 40 mg.     Will obtain follow up lipid panel. Continue rosuvastatin 40 mg.             Erectile dysfunction  This is a chronic condition due to patient's cardiac medications.  Patient states that he has never tried a medication to help with this.    Discussed different medications.    Will provide with sildenafil 100 mg tablets.  Patient to start with 50 mg and if needed increase to 100 mg.  Patient advised possible side effects and or adverse effects.      Return in about 6 months (around 6/14/2023) for follow up for diabetes and ED.      I have placed the below orders and discussed them with an approved delegating provider.  The MA is performing the below orders under the direction of Dr. James.    Please note that this dictation was  created using voice recognition software. I have worked with consultants from the vendor as well as technical experts from [facility] to optimize the interface. I have made every reasonable attempt to correct obvious errors, but I expect that there are errors of grammar and possibly content that I did not discover before finalizing the note.

## 2022-12-14 NOTE — ASSESSMENT & PLAN NOTE
This is a chronic stable condition. Patient is stable on rosuvastatin. His dose was increased to 40 mg.     Will obtain follow up lipid panel. Continue rosuvastatin 40 mg.

## 2022-12-15 NOTE — ASSESSMENT & PLAN NOTE
This is a chronic condition due to patient's cardiac medications.  Patient states that he has never tried a medication to help with this.    Discussed different medications.    Will provide with sildenafil 100 mg tablets.  Patient to start with 50 mg and if needed increase to 100 mg.  Patient advised possible side effects and or adverse effects.

## 2023-02-27 ENCOUNTER — OFFICE VISIT (OUTPATIENT)
Dept: MEDICAL GROUP | Facility: PHYSICIAN GROUP | Age: 43
End: 2023-02-27
Payer: COMMERCIAL

## 2023-02-27 VITALS
BODY MASS INDEX: 45.35 KG/M2 | HEART RATE: 76 BPM | DIASTOLIC BLOOD PRESSURE: 80 MMHG | WEIGHT: 272.2 LBS | HEIGHT: 65 IN | SYSTOLIC BLOOD PRESSURE: 108 MMHG | OXYGEN SATURATION: 94 % | TEMPERATURE: 99.2 F

## 2023-02-27 DIAGNOSIS — M25.511 ACUTE PAIN OF RIGHT SHOULDER: ICD-10-CM

## 2023-02-27 PROCEDURE — 99213 OFFICE O/P EST LOW 20 MIN: CPT

## 2023-02-27 ASSESSMENT — FIBROSIS 4 INDEX: FIB4 SCORE: 1.22

## 2023-02-27 NOTE — LETTER
February 27, 2023    To Whom It May Concern:         This is confirmation that John iWlls attended his scheduled appointment with ELVER Ny on 2/27/23. He is cleared to return to work, however, he has suffered an injury that will require light duty. Janes has limited range of motion of his right arm/shoulder and is currently not able to push, pull or lift >10 lbs.         If you have any questions please do not hesitate to call me at the phone number listed below.    Sincerely,          KERRY Ny.  [phone]

## 2023-02-27 NOTE — PROGRESS NOTES
"Subjective:     CC:   Chief Complaint   Patient presents with    Shoulder Pain     R shoulder pain X3 days    Letter for School/Work       HISTORY OF THE PRESENT ILLNESS: John is a pleasant 42 y.o. male here today requesting a letter for work for right shoulder pain.  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  He has developed worsening pain in his right shoulder, in the area consistent with the biceps tendon.  Patient states he has limited range of motion and is unable to reach his arm up above his head due to a feeling of stiffness.  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  His employer is requesting a note clearing him to return to work with restrictions outlined.  Patient does have stage III chronic kidney disease and is unable to take NSAIDs.  He has been taking Tylenol with some relief.    ROS:  All systems negative expect as addressed in assessment and plan.     Objective:     Exam:  /80 (BP Location: Left arm, Patient Position: Sitting, BP Cuff Size: Adult)   Pulse 76   Temp 37.3 °C (99.2 °F) (Temporal)   Ht 1.651 m (5' 5\")   Wt 123 kg (272 lb 3.2 oz)   SpO2 94%   BMI 45.30 kg/m²  Body mass index is 45.3 kg/m².    Physical Exam  Musculoskeletal:        Arms:        Assessment & Plan:     42 y.o. male with the following -    1. Acute pain of right shoulder  This is a new problem.  Patient is not interested in obtaining an x-ray today.  He states he has broken bones in the past and this does not feel like a broken bone.  Based on his symptoms and location of injury, I agree injury is more consistent with a strain of the biceps tendon.  Patient does not have weakness, numbness or tingling.  Pain does not radiate.  I recommended diclofenac gel.  Offered to order lidocaine patches.  Patient states he is okay with Tylenol and primarily just needs a note for work.  Note written requesting patient be on light duty with " restrictions on pushing, pulling and lifting >10 pounds.    Patient was educated in proper administration of medication(s) ordered today including safety, possible SE, risks, benefits, rationale and alternatives to therapy.   Supportive care, differential diagnoses, and indications for immediate follow-up discussed with patient.    Pathogenesis of diagnosis discussed including typical length and natural progression.    Instructed to return to clinic or nearest emergency department for any change in condition, further concerns, or worsening of symptoms.  Patient states understanding of the plan of care and discharge instructions.    Return if symptoms worsen or fail to improve.    I spent a total of 21 minutes with record review, exam, and communication with the patient, communication with other providers, and documentation of this encounter. This does not include time spent on separately billable procedures/tests.    I have placed the above orders and discussed them with an approved delegating provider.  The MA is performing the below orders under the direction of Dr. Herrera.    Please note that this dictation was created using voice recognition software. I have worked with consultants from the vendor as well as technical experts from [facility] to optimize the interface. I have made every reasonable attempt to correct obvious errors, but I expect that there are errors of grammar and possibly content that I did not discover before finalizing the note.

## 2023-02-27 NOTE — LETTER
February 27, 2023    To Whom It May Concern:         This is confirmation that John Satnamkathleen Wills attended his scheduled appointment with ELVER Ny on 2/27/23. He has an injury that will require           If you have any questions please do not hesitate to call me at the phone number listed below.    Sincerely,          KERRY Ny.  [phone]

## 2023-04-29 RX ORDER — ROSUVASTATIN CALCIUM 40 MG/1
40 TABLET, COATED ORAL DAILY
Qty: 90 TABLET | Refills: 3 | Status: SHIPPED | OUTPATIENT
Start: 2023-04-29 | End: 2023-05-30 | Stop reason: SDUPTHER

## 2023-06-07 RX ORDER — ROSUVASTATIN CALCIUM 40 MG/1
40 TABLET, COATED ORAL DAILY
Qty: 90 TABLET | Refills: 3 | Status: SHIPPED | OUTPATIENT
Start: 2023-06-07

## 2023-06-14 ENCOUNTER — OFFICE VISIT (OUTPATIENT)
Dept: MEDICAL GROUP | Facility: PHYSICIAN GROUP | Age: 43
End: 2023-06-14
Payer: COMMERCIAL

## 2023-06-14 VITALS
WEIGHT: 267.1 LBS | BODY MASS INDEX: 45.6 KG/M2 | TEMPERATURE: 98.2 F | SYSTOLIC BLOOD PRESSURE: 128 MMHG | OXYGEN SATURATION: 94 % | DIASTOLIC BLOOD PRESSURE: 98 MMHG | RESPIRATION RATE: 16 BRPM | HEART RATE: 81 BPM | HEIGHT: 64 IN

## 2023-06-14 DIAGNOSIS — E55.9 VITAMIN D DEFICIENCY: ICD-10-CM

## 2023-06-14 DIAGNOSIS — Z13.29 SCREENING FOR THYROID DISORDER: ICD-10-CM

## 2023-06-14 DIAGNOSIS — E78.5 DYSLIPIDEMIA ASSOCIATED WITH TYPE 2 DIABETES MELLITUS (HCC): ICD-10-CM

## 2023-06-14 DIAGNOSIS — N18.31 STAGE 3A CHRONIC KIDNEY DISEASE: ICD-10-CM

## 2023-06-14 DIAGNOSIS — E11.69 DYSLIPIDEMIA ASSOCIATED WITH TYPE 2 DIABETES MELLITUS (HCC): ICD-10-CM

## 2023-06-14 DIAGNOSIS — E11.8 TYPE 2 DIABETES MELLITUS WITH COMPLICATION, WITHOUT LONG-TERM CURRENT USE OF INSULIN (HCC): ICD-10-CM

## 2023-06-14 LAB
HBA1C MFR BLD: 6.7 % (ref ?–5.8)
POCT INT CON NEG: NEGATIVE
POCT INT CON POS: POSITIVE

## 2023-06-14 PROCEDURE — 99214 OFFICE O/P EST MOD 30 MIN: CPT | Performed by: NURSE PRACTITIONER

## 2023-06-14 PROCEDURE — 83036 HEMOGLOBIN GLYCOSYLATED A1C: CPT | Performed by: NURSE PRACTITIONER

## 2023-06-14 PROCEDURE — 3074F SYST BP LT 130 MM HG: CPT | Performed by: NURSE PRACTITIONER

## 2023-06-14 PROCEDURE — 3080F DIAST BP >= 90 MM HG: CPT | Performed by: NURSE PRACTITIONER

## 2023-06-14 ASSESSMENT — FIBROSIS 4 INDEX: FIB4 SCORE: 1.25

## 2023-06-14 ASSESSMENT — PATIENT HEALTH QUESTIONNAIRE - PHQ9: CLINICAL INTERPRETATION OF PHQ2 SCORE: 0

## 2023-06-14 NOTE — ASSESSMENT & PLAN NOTE
Monofilament testing with a 10 gram force: sensation intact: intact bilaterally  Visual Inspection: Feet without maceration, ulcers, fissures.  Pedal pulses: intact bilaterally     Chronic stable. Patient is stable on jardiance 10mg.     His A1c in office today is 6.7%.     Will increase jardiance dose to 25mg daily.

## 2023-06-14 NOTE — PROGRESS NOTES
"  Chief Complaint   Patient presents with    Diabetes                                                                                                                                       HPI:   John presents today with the following.    Problem   Stage 3 Chronic Kidney Disease (Hcc)   Dyslipidemia Associated With Type 2 Diabetes Mellitus (Hcc)   Type 2 Diabetes Mellitus With Complication, Without Long-Term Current Use of Insulin (Hcc)       Current Outpatient Medications   Medication Sig Dispense Refill    Empagliflozin 25 MG Tab Take 25 mg by mouth every day. 90 Tablet 3    rosuvastatin (CRESTOR) 40 MG tablet Take 1 Tablet by mouth every day. 90 Tablet 3    carvedilol (COREG) 25 MG Tab Take 2 Tablets by mouth every day. 180 Tablet 3    sildenafil citrate (VIAGRA) 100 MG tablet Take 1 Tablet by mouth 1 time a day as needed for Erectile Dysfunction. 10 Tablet 3    amLODIPine (NORVASC) 2.5 MG Tab TAKE 1 TABLET BY MOUTH EVERY DAY 90 Tablet 3     No current facility-administered medications for this visit.       Allergies as of 06/14/2023 - Reviewed 06/14/2023   Allergen Reaction Noted    Sulfa drugs Rash 07/17/2015        ROS:  All systems negative expect as addressed in assessment and plan.     BP (!) 128/98 (BP Location: Right arm, Patient Position: Sitting, BP Cuff Size: Adult)   Pulse 81   Temp 36.8 °C (98.2 °F) (Temporal)   Resp 16   Ht 1.626 m (5' 4\")   Wt 121 kg (267 lb 1.6 oz)   SpO2 94%   BMI 45.85 kg/m²       Physical Exam  Vitals reviewed.   Constitutional:       Appearance: Normal appearance.   HENT:      Head: Normocephalic and atraumatic.      Mouth/Throat:      Mouth: Mucous membranes are moist.   Eyes:      Extraocular Movements: Extraocular movements intact.      Conjunctiva/sclera: Conjunctivae normal.   Pulmonary:      Effort: Pulmonary effort is normal.   Musculoskeletal:         General: Normal range of motion.      Cervical back: Normal range of motion.   Skin:     General: Skin is warm " and dry.   Neurological:      General: No focal deficit present.      Mental Status: He is alert and oriented to person, place, and time.   Psychiatric:         Mood and Affect: Mood normal.         Behavior: Behavior normal.         Thought Content: Thought content normal.       Assessment and Plan:  43 y.o. male with the following issues.    1. Type 2 diabetes mellitus with complication, without long-term current use of insulin ([facility])  POCT A1C    Microalbumin Creat Ratio Urine - Lab Collect    Diabetic Monofilament Lower Extremity Exam    Comp Metabolic Panel      2. Stage 3a chronic kidney disease (HCC)        3. Dyslipidemia associated with type 2 diabetes mellitus (HCC)        4. Screening for thyroid disorder  FREE THYROXINE    TSH      5. Vitamin D deficiency  VITAMIN D,25 HYDROXY (DEFICIENCY)           Type 2 diabetes mellitus with complication, without long-term current use of insulin ([facility])  Monofilament testing with a 10 gram force: sensation intact: intact bilaterally  Visual Inspection: Feet without maceration, ulcers, fissures.  Pedal pulses: intact bilaterally     Chronic stable. Patient is stable on jardiance 10mg.     His A1c in office today is 6.7%.     Will increase jardiance dose to 25mg daily.       Stage 3 chronic kidney disease (HCC)  Chronic condition. Patient has not been able to get his labs done yet.     Will order labs and urine.     Dyslipidemia associated with type 2 diabetes mellitus (HCC)  Chronic stable. Patient denies any adverse effects.     Patient to continue rosuvastatin 40mg daily.       Return in about 3 months (around 9/14/2023) for follow up for diabetes.      Please note that this dictation was created using voice recognition software. I have worked with consultants from the vendor as well as technical experts from [facility] [employer] to optimize the interface. I have made every reasonable attempt to correct obvious errors, but I expect that there are errors of grammar and  possibly content that I did not discover before finalizing the note.

## 2023-06-14 NOTE — ASSESSMENT & PLAN NOTE
Chronic stable. Patient denies any adverse effects.     Patient to continue rosuvastatin 40mg daily.

## 2023-06-14 NOTE — ASSESSMENT & PLAN NOTE
Chronic condition. Patient has not been able to get his labs done yet.     Will order labs and urine.

## 2023-08-04 ENCOUNTER — HOSPITAL ENCOUNTER (OUTPATIENT)
Dept: LAB | Facility: MEDICAL CENTER | Age: 43
End: 2023-08-04
Attending: NURSE PRACTITIONER
Payer: COMMERCIAL

## 2023-08-04 DIAGNOSIS — Z13.29 SCREENING FOR THYROID DISORDER: ICD-10-CM

## 2023-08-04 DIAGNOSIS — E55.9 VITAMIN D DEFICIENCY: ICD-10-CM

## 2023-08-04 DIAGNOSIS — E11.69 DYSLIPIDEMIA ASSOCIATED WITH TYPE 2 DIABETES MELLITUS (HCC): ICD-10-CM

## 2023-08-04 DIAGNOSIS — E11.8 TYPE 2 DIABETES MELLITUS WITH COMPLICATION, WITHOUT LONG-TERM CURRENT USE OF INSULIN (HCC): ICD-10-CM

## 2023-08-04 DIAGNOSIS — E78.5 DYSLIPIDEMIA ASSOCIATED WITH TYPE 2 DIABETES MELLITUS (HCC): ICD-10-CM

## 2023-08-04 DIAGNOSIS — N18.31 STAGE 3A CHRONIC KIDNEY DISEASE: ICD-10-CM

## 2023-08-04 LAB
25(OH)D3 SERPL-MCNC: 19 NG/ML (ref 30–100)
ALBUMIN SERPL BCP-MCNC: 4.2 G/DL (ref 3.2–4.9)
ALBUMIN/GLOB SERPL: 1.1 G/DL
ALP SERPL-CCNC: 69 U/L (ref 30–99)
ALT SERPL-CCNC: 35 U/L (ref 2–50)
ANION GAP SERPL CALC-SCNC: 13 MMOL/L (ref 7–16)
AST SERPL-CCNC: 28 U/L (ref 12–45)
BILIRUB SERPL-MCNC: 0.9 MG/DL (ref 0.1–1.5)
BUN SERPL-MCNC: 26 MG/DL (ref 8–22)
CALCIUM ALBUM COR SERPL-MCNC: 8.9 MG/DL (ref 8.5–10.5)
CALCIUM SERPL-MCNC: 9.1 MG/DL (ref 8.5–10.5)
CHLORIDE SERPL-SCNC: 103 MMOL/L (ref 96–112)
CHOLEST SERPL-MCNC: 143 MG/DL (ref 100–199)
CO2 SERPL-SCNC: 22 MMOL/L (ref 20–33)
CREAT SERPL-MCNC: 1.71 MG/DL (ref 0.5–1.4)
CREAT UR-MCNC: 103.68 MG/DL
FASTING STATUS PATIENT QL REPORTED: NORMAL
GFR SERPLBLD CREATININE-BSD FMLA CKD-EPI: 50 ML/MIN/1.73 M 2
GLOBULIN SER CALC-MCNC: 3.8 G/DL (ref 1.9–3.5)
GLUCOSE SERPL-MCNC: 124 MG/DL (ref 65–99)
HDLC SERPL-MCNC: 28 MG/DL
LDLC SERPL CALC-MCNC: ABNORMAL MG/DL
MICROALBUMIN UR-MCNC: 5.2 MG/DL
MICROALBUMIN/CREAT UR: 50 MG/G (ref 0–30)
PHOSPHATE SERPL-MCNC: 3.1 MG/DL (ref 2.5–4.5)
POTASSIUM SERPL-SCNC: 3.8 MMOL/L (ref 3.6–5.5)
PROT SERPL-MCNC: 8 G/DL (ref 6–8.2)
SODIUM SERPL-SCNC: 138 MMOL/L (ref 135–145)
T4 FREE SERPL-MCNC: 1.21 NG/DL (ref 0.93–1.7)
TRIGL SERPL-MCNC: 513 MG/DL (ref 0–149)
TSH SERPL DL<=0.005 MIU/L-ACNC: 3.93 UIU/ML (ref 0.38–5.33)

## 2023-08-04 PROCEDURE — 80053 COMPREHEN METABOLIC PANEL: CPT

## 2023-08-04 PROCEDURE — 36415 COLL VENOUS BLD VENIPUNCTURE: CPT

## 2023-08-04 PROCEDURE — 84439 ASSAY OF FREE THYROXINE: CPT

## 2023-08-04 PROCEDURE — 82043 UR ALBUMIN QUANTITATIVE: CPT

## 2023-08-04 PROCEDURE — 84443 ASSAY THYROID STIM HORMONE: CPT

## 2023-08-04 PROCEDURE — 80061 LIPID PANEL: CPT

## 2023-08-04 PROCEDURE — 82570 ASSAY OF URINE CREATININE: CPT

## 2023-08-04 PROCEDURE — 84100 ASSAY OF PHOSPHORUS: CPT

## 2023-08-04 PROCEDURE — 82306 VITAMIN D 25 HYDROXY: CPT

## 2023-09-06 ENCOUNTER — OFFICE VISIT (OUTPATIENT)
Dept: MEDICAL GROUP | Facility: PHYSICIAN GROUP | Age: 43
End: 2023-09-06
Payer: COMMERCIAL

## 2023-09-06 VITALS
OXYGEN SATURATION: 93 % | HEIGHT: 64 IN | HEART RATE: 85 BPM | BODY MASS INDEX: 45.93 KG/M2 | DIASTOLIC BLOOD PRESSURE: 86 MMHG | SYSTOLIC BLOOD PRESSURE: 124 MMHG | TEMPERATURE: 98.3 F | WEIGHT: 269 LBS

## 2023-09-06 DIAGNOSIS — N18.31 STAGE 3A CHRONIC KIDNEY DISEASE: ICD-10-CM

## 2023-09-06 DIAGNOSIS — I50.22 HYPERTENSIVE HEART DISEASE WITH CHRONIC SYSTOLIC CONGESTIVE HEART FAILURE (HCC): ICD-10-CM

## 2023-09-06 DIAGNOSIS — E78.5 DYSLIPIDEMIA ASSOCIATED WITH TYPE 2 DIABETES MELLITUS (HCC): ICD-10-CM

## 2023-09-06 DIAGNOSIS — E11.69 DYSLIPIDEMIA ASSOCIATED WITH TYPE 2 DIABETES MELLITUS (HCC): ICD-10-CM

## 2023-09-06 DIAGNOSIS — E11.8 TYPE 2 DIABETES MELLITUS WITH COMPLICATION, WITHOUT LONG-TERM CURRENT USE OF INSULIN (HCC): ICD-10-CM

## 2023-09-06 DIAGNOSIS — I11.0 HYPERTENSIVE HEART DISEASE WITH CHRONIC SYSTOLIC CONGESTIVE HEART FAILURE (HCC): ICD-10-CM

## 2023-09-06 PROCEDURE — 3074F SYST BP LT 130 MM HG: CPT | Performed by: NURSE PRACTITIONER

## 2023-09-06 PROCEDURE — 3079F DIAST BP 80-89 MM HG: CPT | Performed by: NURSE PRACTITIONER

## 2023-09-06 PROCEDURE — 99214 OFFICE O/P EST MOD 30 MIN: CPT | Performed by: NURSE PRACTITIONER

## 2023-09-06 RX ORDER — FENOFIBRATE 54 MG/1
54 TABLET ORAL DAILY
Qty: 90 TABLET | Refills: 3 | Status: SHIPPED | OUTPATIENT
Start: 2023-09-06

## 2023-09-06 ASSESSMENT — FIBROSIS 4 INDEX: FIB4 SCORE: 1.14

## 2023-09-06 NOTE — PROGRESS NOTES
"Family Nurse Practitioner Student Note    Cosigner/Preceptor: CESILIA Miranda    Chief Complaint:                                                                                                                                   HPI:   John presents today with the following.    No problems updated.    Current Outpatient Medications   Medication Sig Dispense Refill    fenofibrate (TRICOR) 54 MG tablet Take 1 Tablet by mouth every day. 90 Tablet 3    Empagliflozin 25 MG Tab Take 25 mg by mouth every day. 90 Tablet 3    rosuvastatin (CRESTOR) 40 MG tablet Take 1 Tablet by mouth every day. 90 Tablet 3    carvedilol (COREG) 25 MG Tab Take 2 Tablets by mouth every day. 180 Tablet 3    sildenafil citrate (VIAGRA) 100 MG tablet Take 1 Tablet by mouth 1 time a day as needed for Erectile Dysfunction. 10 Tablet 3    amLODIPine (NORVASC) 2.5 MG Tab TAKE 1 TABLET BY MOUTH EVERY DAY 90 Tablet 3     No current facility-administered medications for this visit.       Allergies as of 09/06/2023 - Reviewed 09/06/2023   Allergen Reaction Noted    Sulfa drugs Rash 07/17/2015        ROS:  All systems negative expect as addressed in assessment and plan.     /86 (BP Location: Right arm, Patient Position: Sitting, BP Cuff Size: Small adult)   Pulse 85   Temp 36.8 °C (98.3 °F) (Temporal)   Ht 1.626 m (5' 4\")   Wt 122 kg (269 lb)   SpO2 93%   BMI 46.17 kg/m²     Physical Exam  Constitutional:       Appearance: Normal appearance.   HENT:      Head: Normocephalic.   Cardiovascular:      Rate and Rhythm: Normal rate and regular rhythm.      Pulses: Normal pulses.      Heart sounds: Normal heart sounds.   Pulmonary:      Effort: Pulmonary effort is normal.      Breath sounds: Normal breath sounds.   Musculoskeletal:      Cervical back: Normal range of motion.   Neurological:      Mental Status: He is alert and oriented to person, place, and time.   Psychiatric:         Mood and Affect: Mood normal.         Behavior: Behavior " normal.         Thought Content: Thought content normal.         Assessment and Plan:  43 y.o. male with the following issues.      1. Type 2 diabetes mellitus with complication, without long-term current use of insulin (HCC)  Chronic stable. Patient's A1c is 6.7%, steady.     Continue taking Empagliflozin 25mg daily.     2. Dyslipidemia associated with type 2 diabetes mellitus (HCC)  Chronic, unstable.   Lab Results   Component Value Date/Time    CHOLSTRLTOT 143 08/04/2023 06:14 AM    LDL see below 08/04/2023 06:14 AM    HDL 28 (A) 08/04/2023 06:14 AM    TRIGLYCERIDE 513 (H) 08/04/2023 06:14 AM       Discussed lab results with patient. Discussed improving diet, and potentially starting fenofibrate. Recheck labs in 3 months    Start fenofibrate 54 mg daily.       3. Stage 3a chronic kidney disease (HCC)  Chronic stable.     4. Hypertensive heart disease with chronic systolic congestive heart failure.  Chronic stable. Denies headaches, chest pain, dizziness. Follows with cardiology 2 times a year.     Continue taking carvedilol 25 mg daily and amlodipine 2.5 mg daily.       Return in about 4 months (around 1/6/2024), or lab follow up.

## 2023-10-23 DIAGNOSIS — I50.22 HYPERTENSIVE HEART DISEASE WITH CHRONIC SYSTOLIC CONGESTIVE HEART FAILURE (HCC): ICD-10-CM

## 2023-10-23 DIAGNOSIS — I11.0 HYPERTENSIVE HEART DISEASE WITH CHRONIC SYSTOLIC CONGESTIVE HEART FAILURE (HCC): ICD-10-CM

## 2023-10-24 RX ORDER — AMLODIPINE BESYLATE 2.5 MG/1
2.5 TABLET ORAL DAILY
Qty: 90 TABLET | Refills: 0 | Status: SHIPPED | OUTPATIENT
Start: 2023-10-24 | End: 2024-03-14

## 2023-11-02 ENCOUNTER — HOSPITAL ENCOUNTER (OUTPATIENT)
Dept: LAB | Facility: MEDICAL CENTER | Age: 43
End: 2023-11-02
Attending: NURSE PRACTITIONER
Payer: COMMERCIAL

## 2023-11-02 DIAGNOSIS — E78.5 DYSLIPIDEMIA ASSOCIATED WITH TYPE 2 DIABETES MELLITUS (HCC): ICD-10-CM

## 2023-11-02 DIAGNOSIS — E11.8 TYPE 2 DIABETES MELLITUS WITH COMPLICATION, WITHOUT LONG-TERM CURRENT USE OF INSULIN (HCC): ICD-10-CM

## 2023-11-02 DIAGNOSIS — E11.69 DYSLIPIDEMIA ASSOCIATED WITH TYPE 2 DIABETES MELLITUS (HCC): ICD-10-CM

## 2023-11-02 LAB
CHOLEST SERPL-MCNC: 134 MG/DL (ref 100–199)
HDLC SERPL-MCNC: 29 MG/DL
LDLC SERPL CALC-MCNC: 40 MG/DL
TRIGL SERPL-MCNC: 326 MG/DL (ref 0–149)

## 2023-11-02 PROCEDURE — 36415 COLL VENOUS BLD VENIPUNCTURE: CPT

## 2023-11-02 PROCEDURE — 80061 LIPID PANEL: CPT

## 2023-11-07 DIAGNOSIS — I10 HYPERTENSION, UNSPECIFIED TYPE: ICD-10-CM

## 2023-11-10 RX ORDER — CARVEDILOL 25 MG/1
50 TABLET ORAL DAILY
Qty: 180 TABLET | Refills: 0 | Status: SHIPPED | OUTPATIENT
Start: 2023-11-10 | End: 2024-01-11

## 2024-01-10 ENCOUNTER — OFFICE VISIT (OUTPATIENT)
Dept: MEDICAL GROUP | Facility: PHYSICIAN GROUP | Age: 44
End: 2024-01-10
Payer: COMMERCIAL

## 2024-01-10 VITALS
RESPIRATION RATE: 16 BRPM | BODY MASS INDEX: 47.29 KG/M2 | OXYGEN SATURATION: 94 % | HEIGHT: 64 IN | TEMPERATURE: 98.6 F | HEART RATE: 77 BPM | DIASTOLIC BLOOD PRESSURE: 70 MMHG | SYSTOLIC BLOOD PRESSURE: 112 MMHG | WEIGHT: 277 LBS

## 2024-01-10 DIAGNOSIS — Z13.21 ENCOUNTER FOR VITAMIN DEFICIENCY SCREENING: ICD-10-CM

## 2024-01-10 DIAGNOSIS — Z13.29 SCREENING FOR THYROID DISORDER: ICD-10-CM

## 2024-01-10 DIAGNOSIS — L91.8 SKIN TAG: ICD-10-CM

## 2024-01-10 DIAGNOSIS — I50.22 HYPERTENSIVE HEART DISEASE WITH CHRONIC SYSTOLIC CONGESTIVE HEART FAILURE (HCC): ICD-10-CM

## 2024-01-10 DIAGNOSIS — E11.8 TYPE 2 DIABETES MELLITUS WITH COMPLICATION, WITHOUT LONG-TERM CURRENT USE OF INSULIN (HCC): ICD-10-CM

## 2024-01-10 DIAGNOSIS — E11.69 DYSLIPIDEMIA ASSOCIATED WITH TYPE 2 DIABETES MELLITUS (HCC): ICD-10-CM

## 2024-01-10 DIAGNOSIS — I11.0 HYPERTENSIVE HEART DISEASE WITH CHRONIC SYSTOLIC CONGESTIVE HEART FAILURE (HCC): ICD-10-CM

## 2024-01-10 DIAGNOSIS — E78.5 DYSLIPIDEMIA ASSOCIATED WITH TYPE 2 DIABETES MELLITUS (HCC): ICD-10-CM

## 2024-01-10 PROCEDURE — 3074F SYST BP LT 130 MM HG: CPT | Performed by: NURSE PRACTITIONER

## 2024-01-10 PROCEDURE — 3078F DIAST BP <80 MM HG: CPT | Performed by: NURSE PRACTITIONER

## 2024-01-10 PROCEDURE — 99214 OFFICE O/P EST MOD 30 MIN: CPT | Performed by: NURSE PRACTITIONER

## 2024-01-10 ASSESSMENT — PATIENT HEALTH QUESTIONNAIRE - PHQ9: CLINICAL INTERPRETATION OF PHQ2 SCORE: 0

## 2024-01-10 ASSESSMENT — FIBROSIS 4 INDEX: FIB4 SCORE: 1.14

## 2024-01-10 NOTE — PROGRESS NOTES
"Family Nurse Practitioner Student Note    Cosigner/Preceptor: CESILIA Miranda    Chief Complaint: diabetes and cholesterol follow up                                                                                                                                  HPI:   John presents today with the following.    Current Outpatient Medications   Medication Sig Dispense Refill    carvedilol (COREG) 25 MG Tab Take 2 Tablets by mouth every day. 180 Tablet 0    amLODIPine (NORVASC) 2.5 MG Tab TAKE 1 TABLET BY MOUTH EVERY DAY 90 Tablet 0    fenofibrate (TRICOR) 54 MG tablet Take 1 Tablet by mouth every day. 90 Tablet 3    Empagliflozin 25 MG Tab Take 25 mg by mouth every day. 90 Tablet 3    rosuvastatin (CRESTOR) 40 MG tablet Take 1 Tablet by mouth every day. 90 Tablet 3    sildenafil citrate (VIAGRA) 100 MG tablet Take 1 Tablet by mouth 1 time a day as needed for Erectile Dysfunction. 10 Tablet 3     No current facility-administered medications for this visit.       Allergies as of 01/10/2024 - Reviewed 01/10/2024   Allergen Reaction Noted    Sulfa drugs Rash 07/17/2015        ROS:  All systems negative expect as addressed in assessment and plan.     /70 (BP Location: Left arm, Patient Position: Sitting)   Pulse 77   Temp 37 °C (98.6 °F) (Temporal)   Resp 16   Ht 1.626 m (5' 4\")   Wt (!) 126 kg (277 lb)   SpO2 94%   BMI 47.55 kg/m²     Physical Exam  Constitutional:       Appearance: Normal appearance.   HENT:      Head: Normocephalic.   Cardiovascular:      Rate and Rhythm: Normal rate.   Pulmonary:      Effort: Pulmonary effort is normal.   Musculoskeletal:         General: Normal range of motion.      Cervical back: Normal range of motion.   Skin:     General: Skin is warm.      Capillary Refill: Capillary refill takes less than 2 seconds.   Neurological:      General: No focal deficit present.      Mental Status: He is alert and oriented to person, place, and time. Mental status is at baseline. "   Psychiatric:         Mood and Affect: Mood normal.         Behavior: Behavior normal.         Thought Content: Thought content normal.         Judgment: Judgment normal.       Assessment and Plan:  43 y.o. male with the following issues.    Problem List Items Addressed This Visit    1. Type 2 diabetes mellitus with complication, without long-term current use of insulin (HCC)  Chronic unstable. Patient's A1c today is 7.2% in clinic, he states he was not good with his diet and sugar intake over the holidays. GLP-1 medications discussed with patient, at this time he would like to try with diet and exercise on his own. Does not check blood sugars unless feeling abnormal.    Continue empagliflozin 25 mg daily.       2. Dyslipidemia associated with type 2 diabetes mellitus (HCC)  Chronic improved. Patient is still taking rosuvastatin and fenofibrate. Does endorse some muscle pain. Recommended CoQ10 to help with this.    The 10-year ASCVD risk score (Tessy LARKIN, et al., 2019) is: 8.2%    Values used to calculate the score:      Age: 43 years      Sex: Male      Is Non- : No      Diabetic: Yes      Tobacco smoker: Yes      Systolic Blood Pressure: 112 mmHg      Is BP treated: Yes      HDL Cholesterol: 29 mg/dL      Total Cholesterol: 134 mg/dL    Continue rosuvastatin 40 mg daily and fenofibrate 54 mg daily.  Labs placed to recheck lipid panel in 6 months.    3. Hypertensive heart disease with chronic systolic congestive heart failure (HCC)  Chronic stable. Patient denies headaches or chest pain. Does check his blood pressure at home, states it is consistent and rarely elevated.    Continue carvedilol 50 mg daily.       Return in about 6 months (around 7/10/2024) for diabetes and cholesterol .

## 2024-01-11 DIAGNOSIS — I10 HYPERTENSION, UNSPECIFIED TYPE: ICD-10-CM

## 2024-01-11 RX ORDER — CARVEDILOL 25 MG/1
25 TABLET ORAL 2 TIMES DAILY WITH MEALS
Qty: 180 TABLET | Refills: 1 | Status: SHIPPED | OUTPATIENT
Start: 2024-01-11 | End: 2024-02-11 | Stop reason: SDUPTHER

## 2024-02-11 DIAGNOSIS — I10 HYPERTENSION, UNSPECIFIED TYPE: ICD-10-CM

## 2024-02-12 NOTE — TELEPHONE ENCOUNTER
Received request via: Pharmacy    Was the patient seen in the last year in this department? Yes    Does the patient have an active prescription (recently filled or refills available) for medication(s) requested? No    Pharmacy Name:  [id number] - [place], [place] - [address] [facility]     Does the patient have skilled nursing Plus and need 100 day supply (blood pressure, diabetes and cholesterol meds only)? Patient does not have SCP

## 2024-02-13 RX ORDER — CARVEDILOL 25 MG/1
25 TABLET ORAL 2 TIMES DAILY WITH MEALS
Qty: 180 TABLET | Refills: 3 | Status: SHIPPED | OUTPATIENT
Start: 2024-02-13

## 2024-03-14 ENCOUNTER — OFFICE VISIT (OUTPATIENT)
Dept: CARDIOLOGY | Facility: MEDICAL CENTER | Age: 44
End: 2024-03-14
Attending: NURSE PRACTITIONER
Payer: COMMERCIAL

## 2024-03-14 VITALS
OXYGEN SATURATION: 96 % | HEIGHT: 64 IN | BODY MASS INDEX: 46.33 KG/M2 | DIASTOLIC BLOOD PRESSURE: 76 MMHG | WEIGHT: 271.4 LBS | RESPIRATION RATE: 14 BRPM | SYSTOLIC BLOOD PRESSURE: 104 MMHG | HEART RATE: 78 BPM

## 2024-03-14 DIAGNOSIS — G47.33 OBSTRUCTIVE SLEEP APNEA SYNDROME: ICD-10-CM

## 2024-03-14 DIAGNOSIS — E78.5 DYSLIPIDEMIA ASSOCIATED WITH TYPE 2 DIABETES MELLITUS (HCC): ICD-10-CM

## 2024-03-14 DIAGNOSIS — E11.69 DYSLIPIDEMIA ASSOCIATED WITH TYPE 2 DIABETES MELLITUS (HCC): ICD-10-CM

## 2024-03-14 DIAGNOSIS — I10 HTN (HYPERTENSION), MALIGNANT: ICD-10-CM

## 2024-03-14 DIAGNOSIS — I50.20 ACC/AHA STAGE C SYSTOLIC HEART FAILURE (HCC): ICD-10-CM

## 2024-03-14 DIAGNOSIS — Z79.899 HIGH RISK MEDICATION USE: ICD-10-CM

## 2024-03-14 DIAGNOSIS — I50.9 HEART FAILURE, NYHA CLASS 1 (HCC): ICD-10-CM

## 2024-03-14 PROCEDURE — 3078F DIAST BP <80 MM HG: CPT | Performed by: NURSE PRACTITIONER

## 2024-03-14 PROCEDURE — 99214 OFFICE O/P EST MOD 30 MIN: CPT | Performed by: NURSE PRACTITIONER

## 2024-03-14 PROCEDURE — 3074F SYST BP LT 130 MM HG: CPT | Performed by: NURSE PRACTITIONER

## 2024-03-14 ASSESSMENT — ENCOUNTER SYMPTOMS
DIZZINESS: 0
PALPITATIONS: 0
CLAUDICATION: 0
ORTHOPNEA: 0
COUGH: 0
ABDOMINAL PAIN: 0
MYALGIAS: 0
PND: 0
SHORTNESS OF BREATH: 0
FEVER: 0

## 2024-03-14 ASSESSMENT — MINNESOTA LIVING WITH HEART FAILURE QUESTIONNAIRE (MLHF)
HAVING TO SIT OR LIE DOWN DURING THE DAY: 1
MAKING YOU WORRY: 0
TIRED, FATIGUED OR LOW ON ENERGY: 1
GIVING YOU SIDE EFFECTS FROM TREATMENTS: 0
DIFFICULTY WITH SEXUAL ACTIVITIES: 0
DIFFICULTY TO CONCENTRATE OR REMEMBERING THINGS: 0
LOSS OF SELF CONTROL IN YOUR LIFE: 0
DIFFICULTY SOCIALIZING WITH FAMILY OR FRIENDS: 0
MAKING YOU FEEL DEPRESSED: 0
EATING LESS FOODS YOU LIKE: 0
MAKING YOU SHORT OF BREATH: 0
TOTAL_SCORE: 4
SWELLING IN ANKLES OR LEGS: 1
DIFFICULTY WITH RECREATIONAL PASTIMES, SPORTS, HOBBIES: 0
DIFFICULTY WORKING TO EARN A LIVING: 0
FEELING LIKE A BURDEN TO FAMILY AND FRIENDS: 0
WALKING ABOUT OR CLIMBING STAIRS DIFFICULT: 1
DIFFICULTY SLEEPING WELL AT NIGHT: 0
MAKING YOU STAY IN A HOSPITAL: 0
DIFFICULTY GOING AWAY FROM HOME: 0
WORKING AROUND THE HOUSE OR YARD DIFFICULT: 0
COSTING YOU MONEY FOR MEDICAL CARE: 0

## 2024-03-14 ASSESSMENT — FIBROSIS 4 INDEX: FIB4 SCORE: 1.14

## 2024-03-14 NOTE — PROGRESS NOTES
Chief Complaint   Patient presents with    Follow-Up     ACC/AHA stage C systolic heart failure (HCC)           Subjective:   John Wills is a 43 y.o. male who is following up on his heart failure.    Patient of the heart failure clinic.  He was last seen in clinic on 10/25/2022.  During that visit, patient was recommended to start amlodipine 2.5 mg daily for BP.  He reports no problems with the medication.    He does mention that he has been out of his amlodipine for a couple of weeks because he was not able to get back in for follow-up.    Patient feels well, denies chest pain, shortness of breath, palpitations, dizziness/lightheadedness, orthopnea, PND or Edema.      He reports his home weights are ranging between 260-270 pounds.    He mentions he is not exercising, but is considering starting some sort of routine out of the weather is getting better    He also smokes 1 pack per week, he is considering quitting.    Patient has been working with his PCP on his cholesterol and triglyceride levels.  He is taking fenofibrate and a higher dose of rosuvastatin.    Additionally, patient has the following medical problems:    -Diabetes: Controlled, current A1c 6.7, recently taken off Metformin and pt is currently tying to diet control and Jardiance    -Sleep apnea: Using CPAP regularly    -Obesity    -Hypertension    -Dyslipidemia    Past Medical History:   Diagnosis Date    Allergy     Apnea, sleep     Back pain     CHF (congestive heart failure) (HCC)     Chickenpox     Constipation     Cough     COVID-19 9/9/2021    Daytime sleepiness     Diarrhea     Difficulty breathing     Dizziness     Elevated troponin I level 8/20/2019    Fatigue     Frequent headaches     Frequent urination     Gasping for breath     GERD (gastroesophageal reflux disease)     Hearing difficulty     Hypertension     Morning headache     Obesity     Painful joint     Polycythemia 8/20/2019    Rhinitis     Shortness of breath     Sleep apnea      Snoring     Sore muscles     Sweat, sweating, excessive     Swelling of lower extremity     Uncontrolled type 2 diabetes mellitus 8/23/2019    Weakness     Wheezing      Past Surgical History:   Procedure Laterality Date    TYMPANOMASTOIDECTOMY Left 2000     Family History   Problem Relation Age of Onset    Hypertension Maternal Grandmother     Sleep Apnea Father     Hypertension Father     Other Sister         gastroparesis     Social History     Socioeconomic History    Marital status:      Spouse name: Not on file    Number of children: Not on file    Years of education: Not on file    Highest education level: Some college, no degree   Occupational History    Not on file   Tobacco Use    Smoking status: Every Day     Current packs/day: 0.50     Average packs/day: 0.5 packs/day for 28.0 years (14.0 ttl pk-yrs)     Types: Cigarettes     Start date: 3/16/1996    Smokeless tobacco: Never   Vaping Use    Vaping Use: Never used   Substance and Sexual Activity    Alcohol use: Never    Drug use: Never    Sexual activity: Not on file   Other Topics Concern    Not on file   Social History Narrative    Not on file     Social Determinants of Health     Financial Resource Strain: Low Risk  (9/7/2021)    Overall Financial Resource Strain (CARDIA)     Difficulty of Paying Living Expenses: Not hard at all   Food Insecurity: No Food Insecurity (9/7/2021)    Hunger Vital Sign     Worried About Running Out of Food in the Last Year: Never true     Ran Out of Food in the Last Year: Never true   Transportation Needs: No Transportation Needs (9/7/2021)    PRAPARE - Transportation     Lack of Transportation (Medical): No     Lack of Transportation (Non-Medical): No   Physical Activity: Unknown (9/7/2021)    Exercise Vital Sign     Days of Exercise per Week: 5 days     Minutes of Exercise per Session: Patient declined   Stress: No Stress Concern Present (9/7/2021)    [place] [employer] - Occupational  Stress Questionnaire     Feeling of Stress : Not at all   Social Connections: Socially Isolated (9/7/2021)    Social Connection and Isolation Panel [NHANES]     Frequency of Communication with Friends and Family: Once a week     Frequency of Social Gatherings with Friends and Family: Once a week     Attends Druze Services: Never     Active Member of Clubs or Organizations: No     Attends Club or Organization Meetings: Never     Marital Status:    Intimate Partner Violence: Not on file   Housing Stability: Low Risk  (9/7/2021)    Housing Stability Vital Sign     Unable to Pay for Housing in the Last Year: No     Number of Places Lived in the Last Year: 1     Unstable Housing in the Last Year: No     Allergies   Allergen Reactions    Sulfa Drugs Rash     Heat rash      Outpatient Encounter Medications as of 3/14/2024   Medication Sig Dispense Refill    carvedilol (COREG) 25 MG Tab Take 1 Tablet by mouth 2 times a day with meals. (Patient taking differently: Take 25 mg by mouth every day. Two tabs) 180 Tablet 3    fenofibrate (TRICOR) 54 MG tablet Take 1 Tablet by mouth every day. 90 Tablet 3    Empagliflozin 25 MG Tab Take 25 mg by mouth every day. 90 Tablet 3    rosuvastatin (CRESTOR) 40 MG tablet Take 1 Tablet by mouth every day. 90 Tablet 3    sildenafil citrate (VIAGRA) 100 MG tablet Take 1 Tablet by mouth 1 time a day as needed for Erectile Dysfunction. 10 Tablet 3    [DISCONTINUED] amLODIPine (NORVASC) 2.5 MG Tab TAKE 1 TABLET BY MOUTH EVERY DAY 90 Tablet 0     No facility-administered encounter medications on file as of 3/14/2024.     Review of Systems   Constitutional:  Negative for fever and malaise/fatigue.   Respiratory:  Negative for cough and shortness of breath.    Cardiovascular:  Negative for chest pain, palpitations, orthopnea, claudication, leg swelling and PND.   Gastrointestinal:  Negative for abdominal pain.   Musculoskeletal:  Negative for myalgias.   Neurological:  Negative for  "dizziness.   All other systems reviewed and are negative.       Objective:   /76 (BP Location: Right arm, Patient Position: Sitting, BP Cuff Size: Adult)   Pulse 78   Resp 14   Ht 1.626 m (5' 4\")   Wt 123 kg (271 lb 6.4 oz)   SpO2 96%   BMI 46.59 kg/m²     Physical Exam  Vitals reviewed.   Constitutional:       Appearance: He is well-developed.   HENT:      Head: Normocephalic and atraumatic.   Eyes:      Pupils: Pupils are equal, round, and reactive to light.   Neck:      Vascular: No JVD.   Cardiovascular:      Rate and Rhythm: Normal rate and regular rhythm.      Heart sounds: Normal heart sounds.   Pulmonary:      Effort: Pulmonary effort is normal. No respiratory distress.      Breath sounds: Normal breath sounds. No wheezing or rales.   Abdominal:      General: Bowel sounds are normal.      Palpations: Abdomen is soft.   Musculoskeletal:      Cervical back: Normal range of motion and neck supple.      Right lower leg: No edema.      Left lower leg: No edema.   Skin:     General: Skin is warm and dry.   Neurological:      Mental Status: He is alert and oriented to person, place, and time.   Psychiatric:         Behavior: Behavior normal.       Lab Results   Component Value Date/Time    CHOLSTRLTOT 134 11/02/2023 06:18 AM    LDL 40 11/02/2023 06:18 AM    HDL 29 (A) 11/02/2023 06:18 AM    TRIGLYCERIDE 326 (H) 11/02/2023 06:18 AM       Lab Results   Component Value Date/Time    SODIUM 138 08/04/2023 06:14 AM    POTASSIUM 3.8 08/04/2023 06:14 AM    CHLORIDE 103 08/04/2023 06:14 AM    CO2 22 08/04/2023 06:14 AM    GLUCOSE 124 (H) 08/04/2023 06:14 AM    BUN 26 (H) 08/04/2023 06:14 AM    CREATININE 1.71 (H) 08/04/2023 06:14 AM     Lab Results   Component Value Date/Time    ALKPHOSPHAT 69 08/04/2023 06:14 AM    ASTSGOT 28 08/04/2023 06:14 AM    ALTSGPT 35 08/04/2023 06:14 AM    TBILIRUBIN 0.9 08/04/2023 06:14 AM      Transthoracic Echo Report 8/21/2019  No prior study is available for comparison.   Normal " left ventricular chamber size.  Left ventricular ejection fraction is visually estimated to be 40%.  Normal inferior vena cava size and inspiratory collapse.  Poor valvular visualization.     Transthoracic Echo Report 11/4/2019  Compared to the images of the prior study done 08/21/2019, the EF has normalized.  Moderate concentric left ventricular hypertrophy.  Left ventricular ejection fraction is visually estimated to be 55%.  Unable to estimate pulmonary artery pressure due to an inadequate tricuspid regurgitant jet.    Assessment:     1. ACC/AHA stage C systolic heart failure (HCC)  EC-ECHOCARDIOGRAM COMPLETE W/O CONT      2. Heart failure, NYHA class 1 (HCC)        3. HTN (hypertension), malignant        4. Dyslipidemia associated with type 2 diabetes mellitus (HCC)        5. High risk medication use        6. Obstructive sleep apnea syndrome              Medical Decision Making:  Today's Assessment / Status / Plan:   1.  Hypertension: BP appears to be stable off of amlodipine for the past couple weeks  -At this time, patient can stop amlodipine, recommend patient to monitor BP at home and alert our office if his BPs are increasing above 120/80  -Continue carvedilol 50 mg in AM he is not able to tolerate p.m. dose.  Patient can try taking 50 mg twice a day again to see if he can tolerate it.  -Monitor and log Blood pressures at home, 2 hours after medications. Call office or RTC if BP increasing or >180/100 or if symptoms of elevated blood pressure present. Reviewed s/sx of stroke and heart attack. Patient to go to ER or call 911 if present.   -If kidney function does not change, may be component of diabetes.  -Can restart spironolactone if kidney function remains unchanged if BP increases    2.  HFrEF, Stage C, Class 1, LVEF 55% improved from 40%: Based on physical examination findings, patient is euvolemic. No JVD, lungs are clear to auscultation, no pitting edema in bilateral lower extremities, no  ascites.  -Heart failure due to hypertension, comorbid obesity and sleep apnea  -Currently not on diuretic  -Continue carvedilol 50 in AM, he is not able to take p.m. dose  -Continue empagliflozin 25 mg daily  -Stop spironolactone at this time due to some STEPHEN/CKD  -not on lisinopril due to CKD/STEPHEN  -No indication for ICD due to improvement of LVEF  -Reinforced s/sx of worsening heart failure with patient and weight monitoring. Pt verbalizes understanding. Pt to call office or RTC if present.  -Repeat echo at this time    3.  Sleep apnea:  -Using CPAP, tolerating well, he does admit he is not using consistently, discussed importance of using every day  -Continue follow-up with sleep medicine    4.  Dyslipidemia: Last LDL 40 on 11/2/2023  -Continue rosuvastatin  40 mg daily  -Continue fenofibrate 54 mg daily  -triglycerides are elevated but decreasing     5.  Diabetes:  -Currently on empagliflozin 25 mg daily  -Last A1c 6.7 on 6/14/2023    6.  CKD, stage III:  -Continue to follow kidney function, followed by PCP    CMP, lipid panel and A1c due in the next month prior to his appointment with PCP    FU in clinic in 1 year unless echo abnormal. Sooner if needed.    Patient verbalizes understanding and agrees with the plan of care.     PLEASE NOTE: This Note was created using voice recognition Software. I have made every reasonable attempt to correct obvious errors, but I expect that there are errors of grammar and possibly content that I did not discover before finalizing the note

## 2024-04-05 NOTE — TELEPHONE ENCOUNTER
Received request via: Patient    Was the patient seen in the last year in this department? Yes    Does the patient have an active prescription (recently filled or refills available) for medication(s) requested? No    Pharmacy Name: Pharmacy:   [facility] #[id number] - [place], [place] - [address] [facility]     Does the patient have skilled nursing Plus and need 100 day supply (blood pressure, diabetes and cholesterol meds only)? Patient does not have SCP

## 2024-04-12 RX ORDER — SILDENAFIL 100 MG/1
100 TABLET, FILM COATED ORAL
Qty: 10 TABLET | Refills: 0 | Status: SHIPPED | OUTPATIENT
Start: 2024-04-12

## 2024-05-28 NOTE — TELEPHONE ENCOUNTER
Received request via: Pharmacy    Was the patient seen in the last year in this department? Yes    Does the patient have an active prescription (recently filled or refills available) for medication(s) requested? No    Pharmacy Name: Pharmacy:   [facility] #[id number] - [place], [place] - [address] [facility]     Does the patient have [facility] Plus and need 100 day supply (blood pressure, diabetes and cholesterol meds only)? Patient does not have SCP

## 2024-05-29 RX ORDER — EMPAGLIFLOZIN 25 MG/1
1 TABLET, FILM COATED ORAL
Qty: 90 TABLET | Refills: 3 | Status: SHIPPED | OUTPATIENT
Start: 2024-05-29

## 2024-05-30 RX ORDER — ROSUVASTATIN CALCIUM 40 MG/1
40 TABLET, COATED ORAL DAILY
Qty: 90 TABLET | Refills: 3 | Status: SHIPPED | OUTPATIENT
Start: 2024-05-30

## 2024-06-18 ENCOUNTER — HOSPITAL ENCOUNTER (OUTPATIENT)
Dept: LAB | Facility: MEDICAL CENTER | Age: 44
End: 2024-06-18
Attending: NURSE PRACTITIONER
Payer: COMMERCIAL

## 2024-06-18 DIAGNOSIS — E11.8 TYPE 2 DIABETES MELLITUS WITH COMPLICATION, WITHOUT LONG-TERM CURRENT USE OF INSULIN (HCC): ICD-10-CM

## 2024-06-18 DIAGNOSIS — E11.69 DYSLIPIDEMIA ASSOCIATED WITH TYPE 2 DIABETES MELLITUS (HCC): ICD-10-CM

## 2024-06-18 DIAGNOSIS — E78.5 DYSLIPIDEMIA ASSOCIATED WITH TYPE 2 DIABETES MELLITUS (HCC): ICD-10-CM

## 2024-06-18 DIAGNOSIS — I50.22 HYPERTENSIVE HEART DISEASE WITH CHRONIC SYSTOLIC CONGESTIVE HEART FAILURE (HCC): ICD-10-CM

## 2024-06-18 DIAGNOSIS — Z13.29 SCREENING FOR THYROID DISORDER: ICD-10-CM

## 2024-06-18 DIAGNOSIS — Z13.21 ENCOUNTER FOR VITAMIN DEFICIENCY SCREENING: ICD-10-CM

## 2024-06-18 DIAGNOSIS — I11.0 HYPERTENSIVE HEART DISEASE WITH CHRONIC SYSTOLIC CONGESTIVE HEART FAILURE (HCC): ICD-10-CM

## 2024-06-18 LAB
25(OH)D3 SERPL-MCNC: 19 NG/ML (ref 30–100)
ALBUMIN SERPL BCP-MCNC: 4.6 G/DL (ref 3.2–4.9)
ALBUMIN/GLOB SERPL: 1.5 G/DL
ALP SERPL-CCNC: 64 U/L (ref 30–99)
ALT SERPL-CCNC: 38 U/L (ref 2–50)
ANION GAP SERPL CALC-SCNC: 14 MMOL/L (ref 7–16)
AST SERPL-CCNC: 33 U/L (ref 12–45)
BILIRUB SERPL-MCNC: 1 MG/DL (ref 0.1–1.5)
BUN SERPL-MCNC: 25 MG/DL (ref 8–22)
CALCIUM ALBUM COR SERPL-MCNC: 9.2 MG/DL (ref 8.5–10.5)
CALCIUM SERPL-MCNC: 9.7 MG/DL (ref 8.5–10.5)
CHLORIDE SERPL-SCNC: 104 MMOL/L (ref 96–112)
CHOLEST SERPL-MCNC: 149 MG/DL (ref 100–199)
CO2 SERPL-SCNC: 23 MMOL/L (ref 20–33)
CREAT SERPL-MCNC: 1.39 MG/DL (ref 0.5–1.4)
EST. AVERAGE GLUCOSE BLD GHB EST-MCNC: 157 MG/DL
FASTING STATUS PATIENT QL REPORTED: NORMAL
GFR SERPLBLD CREATININE-BSD FMLA CKD-EPI: 64 ML/MIN/1.73 M 2
GLOBULIN SER CALC-MCNC: 3.1 G/DL (ref 1.9–3.5)
GLUCOSE SERPL-MCNC: 138 MG/DL (ref 65–99)
HBA1C MFR BLD: 7.1 % (ref 4–5.6)
HDLC SERPL-MCNC: 32 MG/DL
LDLC SERPL CALC-MCNC: ABNORMAL MG/DL
POTASSIUM SERPL-SCNC: 4 MMOL/L (ref 3.6–5.5)
PROT SERPL-MCNC: 7.7 G/DL (ref 6–8.2)
SODIUM SERPL-SCNC: 141 MMOL/L (ref 135–145)
T4 FREE SERPL-MCNC: 1.18 NG/DL (ref 0.93–1.7)
TRIGL SERPL-MCNC: 459 MG/DL (ref 0–149)
TSH SERPL DL<=0.005 MIU/L-ACNC: 2.68 UIU/ML (ref 0.38–5.33)

## 2024-06-18 PROCEDURE — 36415 COLL VENOUS BLD VENIPUNCTURE: CPT

## 2024-06-18 PROCEDURE — 80061 LIPID PANEL: CPT

## 2024-06-18 PROCEDURE — 80053 COMPREHEN METABOLIC PANEL: CPT

## 2024-06-18 PROCEDURE — 84439 ASSAY OF FREE THYROXINE: CPT

## 2024-06-18 PROCEDURE — 83036 HEMOGLOBIN GLYCOSYLATED A1C: CPT

## 2024-06-18 PROCEDURE — 84443 ASSAY THYROID STIM HORMONE: CPT

## 2024-06-18 PROCEDURE — 82306 VITAMIN D 25 HYDROXY: CPT

## 2024-07-10 ENCOUNTER — APPOINTMENT (OUTPATIENT)
Dept: MEDICAL GROUP | Facility: PHYSICIAN GROUP | Age: 44
End: 2024-07-10
Payer: COMMERCIAL

## 2024-08-15 ENCOUNTER — HOSPITAL ENCOUNTER (OUTPATIENT)
Dept: CARDIOLOGY | Facility: MEDICAL CENTER | Age: 44
End: 2024-08-15
Attending: NURSE PRACTITIONER
Payer: COMMERCIAL

## 2024-08-15 DIAGNOSIS — I50.20 ACC/AHA STAGE C SYSTOLIC HEART FAILURE (HCC): ICD-10-CM

## 2024-08-15 LAB
LV EJECT FRACT  99904: 60
LV EJECT FRACT MOD 2C 99903: 57.76
LV EJECT FRACT MOD 4C 99902: 65.16
LV EJECT FRACT MOD BP 99901: 61.29

## 2024-08-15 PROCEDURE — 93306 TTE W/DOPPLER COMPLETE: CPT

## 2024-08-15 PROCEDURE — 93306 TTE W/DOPPLER COMPLETE: CPT | Mod: 26 | Performed by: INTERNAL MEDICINE

## 2024-08-28 ENCOUNTER — OFFICE VISIT (OUTPATIENT)
Dept: MEDICAL GROUP | Facility: PHYSICIAN GROUP | Age: 44
End: 2024-08-28
Payer: COMMERCIAL

## 2024-08-28 VITALS
HEIGHT: 64 IN | SYSTOLIC BLOOD PRESSURE: 108 MMHG | HEART RATE: 84 BPM | WEIGHT: 274 LBS | DIASTOLIC BLOOD PRESSURE: 78 MMHG | OXYGEN SATURATION: 95 % | RESPIRATION RATE: 16 BRPM | TEMPERATURE: 98.3 F | BODY MASS INDEX: 46.78 KG/M2

## 2024-08-28 DIAGNOSIS — E11.8 TYPE 2 DIABETES MELLITUS WITH COMPLICATION, WITHOUT LONG-TERM CURRENT USE OF INSULIN (HCC): ICD-10-CM

## 2024-08-28 DIAGNOSIS — N18.31 STAGE 3A CHRONIC KIDNEY DISEASE: ICD-10-CM

## 2024-08-28 DIAGNOSIS — E78.5 DYSLIPIDEMIA ASSOCIATED WITH TYPE 2 DIABETES MELLITUS (HCC): ICD-10-CM

## 2024-08-28 DIAGNOSIS — I50.22 HYPERTENSIVE HEART DISEASE WITH CHRONIC SYSTOLIC CONGESTIVE HEART FAILURE (HCC): ICD-10-CM

## 2024-08-28 DIAGNOSIS — I11.0 HYPERTENSIVE HEART DISEASE WITH CHRONIC SYSTOLIC CONGESTIVE HEART FAILURE (HCC): ICD-10-CM

## 2024-08-28 DIAGNOSIS — E78.1 HYPERTRIGLYCERIDEMIA: ICD-10-CM

## 2024-08-28 DIAGNOSIS — E11.69 DYSLIPIDEMIA ASSOCIATED WITH TYPE 2 DIABETES MELLITUS (HCC): ICD-10-CM

## 2024-08-28 PROCEDURE — 3074F SYST BP LT 130 MM HG: CPT | Performed by: NURSE PRACTITIONER

## 2024-08-28 PROCEDURE — 99214 OFFICE O/P EST MOD 30 MIN: CPT | Performed by: NURSE PRACTITIONER

## 2024-08-28 PROCEDURE — 3078F DIAST BP <80 MM HG: CPT | Performed by: NURSE PRACTITIONER

## 2024-08-28 RX ORDER — FENOFIBRATE 90 MG/1
90 CAPSULE ORAL DAILY
Qty: 90 CAPSULE | Refills: 3 | Status: SHIPPED | OUTPATIENT
Start: 2024-08-28 | End: 2024-09-12 | Stop reason: SDUPTHER

## 2024-08-28 NOTE — PROGRESS NOTES
"  Chief Complaint   Patient presents with    Follow-Up     Lab review                                                                                                                                       HPI:   John presents today with the following.    ***    ROS:  All systems negative expect as addressed in assessment and plan.     /78 (BP Location: Left arm, Patient Position: Sitting)   Pulse 84   Temp 36.8 °C (98.3 °F) (Temporal)   Resp 16   Ht 1.626 m (5' 4\")   Wt 124 kg (274 lb)   SpO2 95%   BMI 47.03 kg/m²     Objective:    Physical Exam      ***    Assessment and Plan:  44 y.o. male with the following issues.    1. Type 2 diabetes mellitus with complication, without long-term current use of insulin (HCC)  - Lipid Profile; Future  - MICROALBUMIN CREAT RATIO URINE; Future    2. Stage 3a chronic kidney disease  - MICROALBUMIN CREAT RATIO URINE; Future    3. Hypertensive heart disease with chronic systolic congestive heart failure (HCC)  - MICROALBUMIN CREAT RATIO URINE; Future    4. Dyslipidemia associated with type 2 diabetes mellitus (HCC)  - Lipid Profile; Future    5. Hypertriglyceridemia  - Lipid Profile; Future    Other orders  - Fenofibrate Micronized 90 MG Cap; Take 90 mg by mouth every day.  Dispense: 90 Capsule; Refill: 3        ***    No follow-ups on file.    I spent a total of *** minutes with record review, exam, and communication with the patient, communication with other providers, and documentation of this encounter. This does not include time spent on separately billable procedures/tests.    Please note that this dictation was created using voice recognition software. I have worked with consultants from the vendor as well as technical experts from [facility] [employer] to optimize the interface. I have made every reasonable attempt to correct obvious errors, but I expect that there are errors of grammar and possibly content that I did not discover before finalizing the note.       " Results:  - Laboratory Tests:    - Hemoglobin A1C: 7.1% (current)    - Triglycerides: Increased level noted.    - Vitamin D: Low, supplementation recommended    - Thyroid Function Tests: Normal       Latest Reference Range & Units 06/18/24 06:11   Sodium 135 - 145 mmol/L 141   Potassium 3.6 - 5.5 mmol/L 4.0   Chloride 96 - 112 mmol/L 104   Co2 20 - 33 mmol/L 23   Anion Gap 7.0 - 16.0  14.0   Glucose 65 - 99 mg/dL 138 (H)   Bun 8 - 22 mg/dL 25 (H)   Creatinine 0.50 - 1.40 mg/dL 1.39   GFR (CKD-EPI) >60 mL/min/1.73 m 2 64   Calcium 8.5 - 10.5 mg/dL 9.7   Correct Calcium 8.5 - 10.5 mg/dL 9.2   AST(SGOT) 12 - 45 U/L 33   ALT(SGPT) 2 - 50 U/L 38   Alkaline Phosphatase 30 - 99 U/L 64   Total Bilirubin 0.1 - 1.5 mg/dL 1.0   Albumin 3.2 - 4.9 g/dL 4.6   Total Protein 6.0 - 8.2 g/dL 7.7   Globulin 1.9 - 3.5 g/dL 3.1   A-G Ratio g/dL 1.5   Glycohemoglobin 4.0 - 5.6 % 7.1 (H)   Estim. Avg Glu mg/dL 157   Fasting Status  Fasting   Cholesterol,Tot 100 - 199 mg/dL 149   Triglycerides 0 - 149 mg/dL 459 (H)   HDL >=40 mg/dL 32 !   LDL <100 mg/dL see below   25-Hydroxy   Vitamin D 25 30 - 100 ng/mL 19 (L)   TSH 0.380 - 5.330 uIU/mL 2.680   Free T-4 0.93 - 1.70 ng/dL 1.18     - Imaging:    - Echocardiogram: Normal ejection fraction and heart function, mild left ventricular hypertrophy observed, all heart valves structurally normal including pulmonic valve and aorta.    Assessment and Plan:  44 y.o. male with the following issues.    1. Type 2 diabetes mellitus with complication, without long-term current use of insulin ([facility])  - Lipid Profile; Future  - MICROALBUMIN CREAT RATIO URINE; Future    2. Stage 3a chronic kidney disease  - MICROALBUMIN CREAT RATIO URINE; Future    3. Hypertensive heart disease with chronic systolic congestive heart failure (HCC)  - MICROALBUMIN CREAT RATIO URINE; Future    4. Dyslipidemia associated with type 2 diabetes mellitus (HCC)  - Lipid Profile; Future    5. Hypertriglyceridemia  - Fenofibrate  Micronized 90 MG Cap; Take 90 mg by mouth every day.  Dispense: 90 Capsule; Refill: 3  - Lipid Profile; Future    1. Type 2 Diabetes Mellitus:     - Assessment: A1C: 7.1     - Plan:          a. Continue current management with Jardiance.          b. Schedule a follow-up lipid panel and urine protein screening in six months.    2. Hypertriglyceridemia:     - Assessment: Patient has hypertriglyceridemia.     - Plan:          a. Increase fenofibrate from 45 mg to 90 mg.          b. Consider adding bempedoic acid or ezetimibe if triglycerides do not improve.          c. Recheck lipid panel in six months.    3. Statin-induced myalgias:     - Assessment: Patient experiencing statin-induced myalgias and taking CoQ10 400 mg.     - Plan:          a. Continue CoQ10 supplementation.          b. Consider decreasing rosuvastatin or switching to bempedoic acid or ezetimibe if myalgias persist or worsen.    4. Vitamin D deficiency:     - Plan:          a. Recommend patient to take a Vitamin D supplement.          b. Recheck Vitamin D levels in six months.    5. Hypertension:     - Assessment: Mild left ventricular hypertrophy noted on recent echocardiogram.     - Plan:          a. Continue current antihypertensive medications.          b. Monitor blood pressure and heart function.    6. Sleep apnea:     - Plan: Continue current management. Monitor for any changes in symptoms or complications.    7. Pharmacy and insurance issues:     - Plan:          a. [facility]          b. Encourage patient to contact [employer] to inquire about alternative mail carriers for potential cost savings.    8. Diabetic foot care:     - Assessment: No current issues reported.     - Plan: Encourage patient to continue monitoring feet for any changes or concerns. Reassess at the next visit in six months.    Return in about 6 months (around 2/28/2025) for Chronic Health Conditions, High cholesterol.      Please note that this  dictation was created using voice recognition software. I have worked with consultants from the vendor as well as technical experts from [facility] to optimize the interface. I have made every reasonable attempt to correct obvious errors, but I expect that there are errors of grammar and possibly content that I did not discover before finalizing the note.

## 2024-09-13 NOTE — TELEPHONE ENCOUNTER
Received request via: Pharmacy    Was the patient seen in the last year in this department? Yes    Does the patient have an active prescription (recently filled or refills available) for medication(s) requested? No    [email or web address]    Does the patient have [facility] and need 100-day supply? (This applies to ALL medications) Patient does not have SCP

## 2024-09-16 ENCOUNTER — PATIENT MESSAGE (OUTPATIENT)
Dept: MEDICAL GROUP | Facility: PHYSICIAN GROUP | Age: 44
End: 2024-09-16
Payer: COMMERCIAL

## 2024-09-16 DIAGNOSIS — E78.5 DYSLIPIDEMIA ASSOCIATED WITH TYPE 2 DIABETES MELLITUS (HCC): ICD-10-CM

## 2024-09-16 DIAGNOSIS — E78.1 HYPERTRIGLYCERIDEMIA: ICD-10-CM

## 2024-09-16 DIAGNOSIS — E11.69 DYSLIPIDEMIA ASSOCIATED WITH TYPE 2 DIABETES MELLITUS (HCC): ICD-10-CM

## 2024-09-20 RX ORDER — FENOFIBRATE 90 MG/1
90 CAPSULE ORAL DAILY
Qty: 90 CAPSULE | Refills: 3 | Status: SHIPPED | OUTPATIENT
Start: 2024-09-20 | End: 2024-09-24

## 2024-09-24 RX ORDER — FENOFIBRATE 120 MG/1
120 TABLET ORAL DAILY
Qty: 90 TABLET | Refills: 1 | Status: SHIPPED | OUTPATIENT
Start: 2024-09-24

## 2024-12-06 NOTE — TELEPHONE ENCOUNTER
Received request via: Patient    Was the patient seen in the last year in this department? Yes    Does the patient have an active prescription (recently filled or refills available) for medication(s) requested? No    [facility]    Does the [facility] have [facility] and need 100-day supply? (This applies to ALL medications) Patient does not have SCP

## 2024-12-10 RX ORDER — SILDENAFIL 100 MG/1
100 TABLET, FILM COATED ORAL
Qty: 10 TABLET | Refills: 0 | Status: SHIPPED | OUTPATIENT
Start: 2024-12-10

## 2025-02-14 DIAGNOSIS — I10 HYPERTENSION, UNSPECIFIED TYPE: ICD-10-CM

## 2025-02-14 RX ORDER — CARVEDILOL 25 MG/1
25 TABLET ORAL 2 TIMES DAILY WITH MEALS
Qty: 180 TABLET | Refills: 0 | Status: SHIPPED | OUTPATIENT
Start: 2025-02-14 | End: 2025-02-26 | Stop reason: SDUPTHER

## 2025-02-14 NOTE — TELEPHONE ENCOUNTER
Received request via: Patient    Was the patient seen in the last year in this department? Yes    Does the patient have an active prescription (recently filled or refills available) for medication(s) requested? No    Pharmacy Name: [facility]    Does the patient have [facility] and need 100-day supply? (This applies to ALL medications) Patient does not have SCP

## 2025-02-24 ENCOUNTER — HOSPITAL ENCOUNTER (OUTPATIENT)
Dept: LAB | Facility: MEDICAL CENTER | Age: 45
End: 2025-02-24
Attending: NURSE PRACTITIONER
Payer: COMMERCIAL

## 2025-02-24 DIAGNOSIS — E78.5 DYSLIPIDEMIA ASSOCIATED WITH TYPE 2 DIABETES MELLITUS (HCC): ICD-10-CM

## 2025-02-24 DIAGNOSIS — I11.0 HYPERTENSIVE HEART DISEASE WITH CHRONIC SYSTOLIC CONGESTIVE HEART FAILURE (HCC): ICD-10-CM

## 2025-02-24 DIAGNOSIS — I50.22 HYPERTENSIVE HEART DISEASE WITH CHRONIC SYSTOLIC CONGESTIVE HEART FAILURE (HCC): ICD-10-CM

## 2025-02-24 DIAGNOSIS — E78.1 HYPERTRIGLYCERIDEMIA: ICD-10-CM

## 2025-02-24 DIAGNOSIS — N18.31 STAGE 3A CHRONIC KIDNEY DISEASE: ICD-10-CM

## 2025-02-24 DIAGNOSIS — E11.8 TYPE 2 DIABETES MELLITUS WITH COMPLICATION, WITHOUT LONG-TERM CURRENT USE OF INSULIN (HCC): ICD-10-CM

## 2025-02-24 DIAGNOSIS — E11.69 DYSLIPIDEMIA ASSOCIATED WITH TYPE 2 DIABETES MELLITUS (HCC): ICD-10-CM

## 2025-02-24 LAB
CHOLEST SERPL-MCNC: 148 MG/DL (ref 100–199)
CREAT UR-MCNC: 75.5 MG/DL
HDLC SERPL-MCNC: 32 MG/DL
LDLC SERPL CALC-MCNC: ABNORMAL MG/DL
MICROALBUMIN UR-MCNC: 3.7 MG/DL
MICROALBUMIN/CREAT UR: 49 MG/G (ref 0–30)
TRIGL SERPL-MCNC: 424 MG/DL (ref 0–149)

## 2025-02-24 PROCEDURE — 80061 LIPID PANEL: CPT

## 2025-02-24 PROCEDURE — 82570 ASSAY OF URINE CREATININE: CPT

## 2025-02-24 PROCEDURE — 82043 UR ALBUMIN QUANTITATIVE: CPT

## 2025-02-24 PROCEDURE — 36415 COLL VENOUS BLD VENIPUNCTURE: CPT

## 2025-02-26 ENCOUNTER — OFFICE VISIT (OUTPATIENT)
Dept: MEDICAL GROUP | Facility: PHYSICIAN GROUP | Age: 45
End: 2025-02-26
Payer: COMMERCIAL

## 2025-02-26 VITALS
OXYGEN SATURATION: 95 % | SYSTOLIC BLOOD PRESSURE: 110 MMHG | HEIGHT: 64 IN | HEART RATE: 84 BPM | RESPIRATION RATE: 16 BRPM | BODY MASS INDEX: 48.49 KG/M2 | TEMPERATURE: 98.6 F | WEIGHT: 284 LBS | DIASTOLIC BLOOD PRESSURE: 72 MMHG

## 2025-02-26 DIAGNOSIS — I10 HYPERTENSION, UNSPECIFIED TYPE: ICD-10-CM

## 2025-02-26 DIAGNOSIS — E11.8 TYPE 2 DIABETES MELLITUS WITH COMPLICATION, WITHOUT LONG-TERM CURRENT USE OF INSULIN (HCC): ICD-10-CM

## 2025-02-26 DIAGNOSIS — Z13.29 SCREENING FOR THYROID DISORDER: ICD-10-CM

## 2025-02-26 DIAGNOSIS — E55.9 VITAMIN D DEFICIENCY: ICD-10-CM

## 2025-02-26 DIAGNOSIS — E11.69 DYSLIPIDEMIA ASSOCIATED WITH TYPE 2 DIABETES MELLITUS (HCC): ICD-10-CM

## 2025-02-26 DIAGNOSIS — E78.1 HYPERTRIGLYCERIDEMIA: ICD-10-CM

## 2025-02-26 DIAGNOSIS — E78.5 DYSLIPIDEMIA ASSOCIATED WITH TYPE 2 DIABETES MELLITUS (HCC): ICD-10-CM

## 2025-02-26 PROBLEM — E11.22 STAGE 3 CHRONIC KIDNEY DISEASE DUE TO TYPE 2 DIABETES MELLITUS (HCC): Status: ACTIVE | Noted: 2020-05-06

## 2025-02-26 PROCEDURE — 3074F SYST BP LT 130 MM HG: CPT | Performed by: NURSE PRACTITIONER

## 2025-02-26 PROCEDURE — 3078F DIAST BP <80 MM HG: CPT | Performed by: NURSE PRACTITIONER

## 2025-02-26 PROCEDURE — 99214 OFFICE O/P EST MOD 30 MIN: CPT | Performed by: NURSE PRACTITIONER

## 2025-02-26 RX ORDER — CARVEDILOL 25 MG/1
25 TABLET ORAL 2 TIMES DAILY WITH MEALS
Qty: 180 TABLET | Refills: 0 | Status: SHIPPED | OUTPATIENT
Start: 2025-02-26

## 2025-02-26 RX ORDER — ROSUVASTATIN CALCIUM 40 MG/1
40 TABLET, COATED ORAL DAILY
Qty: 90 TABLET | Refills: 3 | Status: SHIPPED | OUTPATIENT
Start: 2025-02-26

## 2025-02-26 RX ORDER — EMPAGLIFLOZIN 25 MG/1
1 TABLET, FILM COATED ORAL
Qty: 90 TABLET | Refills: 3 | Status: SHIPPED | OUTPATIENT
Start: 2025-02-26

## 2025-02-26 RX ORDER — FENOFIBRATE 145 MG/1
145 TABLET, COATED ORAL DAILY
Qty: 90 TABLET | Refills: 3 | Status: SHIPPED | OUTPATIENT
Start: 2025-02-26

## 2025-02-26 ASSESSMENT — PATIENT HEALTH QUESTIONNAIRE - PHQ9: CLINICAL INTERPRETATION OF PHQ2 SCORE: 0

## 2025-02-26 NOTE — PROGRESS NOTES
"  Chief Complaint   Patient presents with   • Diabetes Follow-up                                                                                                                                       HPI:   John presents today with the following.    ***    ROS:  All systems negative expect as addressed in assessment and plan.     /72 (BP Location: Left arm, Patient Position: Sitting)   Pulse 84   Temp 37 °C (98.6 °F) (Temporal)   Resp 16   Ht 1.626 m (5' 4\")   Wt (!) 129 kg (284 lb)   SpO2 95%   BMI 48.75 kg/m²     Objective:    Physical Exam      Monofilament testing with a 10 gram force: sensation intact: intact bilaterally  Visual Inspection: Feet without maceration, ulcers, fissures.  Pedal pulses: intact bilaterally      Assessment and Plan:  44 y.o. male with the following issues.    1. Type 2 diabetes mellitus with complication, without long-term current use of insulin (HCC)  - Diabetic Monofilament LE Exam  - Empagliflozin (JARDIANCE) 25 MG Tab; Take 1 Tablet by mouth every day.  Dispense: 90 Tablet; Refill: 3  - HEMOGLOBIN A1C; Future  - Comp Metabolic Panel; Future    2. Hypertriglyceridemia  - Referral to Lipid Clinic  - rosuvastatin (CRESTOR) 40 MG tablet; Take 1 Tablet by mouth every day.  Dispense: 90 Tablet; Refill: 3  - fenofibrate (TRICOR) 145 MG Tab; Take 1 Tablet by mouth every day.  Dispense: 90 Tablet; Refill: 3    3. Hypertension, unspecified type  - carvedilol (COREG) 25 MG Tab; Take 1 Tablet by mouth 2 times a day with meals.  Dispense: 180 Tablet; Refill: 0  - Comp Metabolic Panel; Future    4. Dyslipidemia associated with type 2 diabetes mellitus (HCC)  - rosuvastatin (CRESTOR) 40 MG tablet; Take 1 Tablet by mouth every day.  Dispense: 90 Tablet; Refill: 3  - fenofibrate (TRICOR) 145 MG Tab; Take 1 Tablet by mouth every day.  Dispense: 90 Tablet; Refill: 3    5. Vitamin D deficiency  - VITAMIN D,25 HYDROXY (DEFICIENCY); Future    6. Screening for thyroid disorder  - TSH; " Future  - FREE THYROXINE; Future        ***    Return in about 4 months (around 6/26/2025) for Diabetes.    I spent a total of *** minutes with record review, exam, and communication with the patient, communication with other providers, and documentation of this encounter. This does not include time spent on separately billable procedures/tests.    Please note that this dictation was created using voice recognition software. I have worked with consultants from the vendor as well as technical experts from [facility] [employer] to optimize the interface. I have made every reasonable attempt to correct obvious errors, but I expect that there are errors of grammar and possibly content that I did not discover before finalizing the note.        day.  Dispense: 90 Tablet; Refill: 3  - HEMOGLOBIN A1C; Future  - Comp Metabolic Panel; Future    2. Hypertriglyceridemia  - Referral to Lipid Clinic  - rosuvastatin (CRESTOR) 40 MG tablet; Take 1 Tablet by mouth every day.  Dispense: 90 Tablet; Refill: 3  - fenofibrate (TRICOR) 145 MG Tab; Take 1 Tablet by mouth every day.  Dispense: 90 Tablet; Refill: 3    3. Hypertension, unspecified type  - carvedilol (COREG) 25 MG Tab; Take 1 Tablet by mouth 2 times a day with meals.  Dispense: 180 Tablet; Refill: 0  - Comp Metabolic Panel; Future    4. Dyslipidemia associated with type 2 diabetes mellitus (HCC)  - rosuvastatin (CRESTOR) 40 MG tablet; Take 1 Tablet by mouth every day.  Dispense: 90 Tablet; Refill: 3  - fenofibrate (TRICOR) 145 MG Tab; Take 1 Tablet by mouth every day.  Dispense: 90 Tablet; Refill: 3    5. Vitamin D deficiency  - VITAMIN D,25 HYDROXY (DEFICIENCY); Future    6. Screening for thyroid disorder  - TSH; Future  - FREE THYROXINE; Future      1. Diabetes Mellitus Type 2 (DM2):     - Plan:           a. Await current A1C results. Last A1C: 7.1. Clinician anticipates possible increase.          b. Schedule follow-up appointment for end of June (4 months).          c. Order labs 1 week prior to follow-up: A1C, vitamin D, TSH.          d. Consider Trulicity or Ozempic if A1C rises and weight loss is challenging.          e. Patient educated on medication consistency importance.     - Note: Urine protein slightly decreased. Patient acknowledges need for weight loss.    2. Hypertriglyceridemia (HTG):     - Plan:          a. Refer to lipid clinic.          b. Change fenofibrate to 145mg for improved availability.          c. Patient to check insurance coverage for lipidologist consult.          d. Anticipate initial pharmacist consult, then appointment with Dr. William or Dr. Connelly.     - Note: Triglycerides decreased from 459 to 424, still significantly elevated. Current fenofibrate therapy  ineffective.    3. Medication Management:     - Plan:          a. All current medications refilled.          b. Patient educated on consistency importance.          c. Fenofibrate dose adjusted to more available formulation.          d. Advised on potential pharmacy communication issues.     - Note: Patient reports refill issues at [facility], particularly fenofibrate availability, leading to potential adherence gaps.    4. Vitamin D Deficiency:     - Plan: Recheck levels with [facility] labs.     - Note: Previous labs indicated low levels.      Return in about 4 months (around 6/26/2025) for Diabetes.      Please note that this dictation was created using voice recognition software. I have worked with consultants from the vendor as well as technical experts from [facility] to optimize the interface. I have made every reasonable attempt to correct obvious errors, but I expect that there are errors of grammar and possibly content that I did not discover before finalizing the note.

## 2025-02-27 ENCOUNTER — RESULTS FOLLOW-UP (OUTPATIENT)
Dept: MEDICAL GROUP | Facility: PHYSICIAN GROUP | Age: 45
End: 2025-02-27

## 2025-03-03 ENCOUNTER — TELEPHONE (OUTPATIENT)
Dept: HEALTH INFORMATION MANAGEMENT | Facility: OTHER | Age: 45
End: 2025-03-03
Payer: COMMERCIAL

## 2025-03-03 NOTE — Clinical Note
REFERRAL APPROVAL NOTICE         Sent on March 3, 2025                   Janes Boonegee Wills  [address] [place] [address]                   Dear Mr. Wills,    After a careful review of the medical information and benefit coverage, [facility] has processed your referral. See below for additional details.    If applicable, you must be actively enrolled with your insurance for coverage of the authorized service. If you have any questions regarding your coverage, please contact your insurance directly.    REFERRAL INFORMATION   Referral #:  [id number]  Referred-To Department    Referred-By Provider:  Lipid Clinic    ELVER Pinedo   Vascular Medicine      [address] [place] [address]  [place] [id number] [phone] [address] [address]  [place] [address]  [phone]    Referral Start Date:  02/26/2025  Referral End Date:   02/26/2026             SCHEDULING  If you do not already have an appointment, please call [phone] to make an appointment.     MORE INFORMATION  If you do not already have a [employer] account, sign up at: [email or web address]  You can access your medical information, make appointments, see lab results, billing information, and more.  If you have questions regarding this referral, please contact  the [facility] Referrals department at:             [phone]. Monday - Friday 8:00AM - 5:00PM.     Sincerely,    [facility]

## 2025-03-29 ENCOUNTER — DOCUMENTATION (OUTPATIENT)
Dept: VASCULAR LAB | Facility: MEDICAL CENTER | Age: 45
End: 2025-03-29
Payer: COMMERCIAL

## 2025-03-30 NOTE — PROGRESS NOTES
Patient referred to vascular care  Unfortunately our schedulers have been unable to reach patient despite multiple attempts  Unless patient establishes with a face-to-face visit in our office will be unable to take part in care  Await further patient contact.  Pending further contact, we will defer all further management of vascular disease and its risk factors to PCP and/or referring MD.    Michael J. Bloch, MD  Vascular Care    cc:   QUINCY Salcido

## 2025-04-30 ENCOUNTER — TELEPHONE (OUTPATIENT)
Dept: VASCULAR LAB | Facility: MEDICAL CENTER | Age: 45
End: 2025-04-30
Payer: COMMERCIAL

## 2025-05-02 ENCOUNTER — APPOINTMENT (OUTPATIENT)
Dept: VASCULAR LAB | Facility: MEDICAL CENTER | Age: 45
End: 2025-05-02
Payer: COMMERCIAL

## 2025-05-02 VITALS
SYSTOLIC BLOOD PRESSURE: 132 MMHG | BODY MASS INDEX: 47.8 KG/M2 | DIASTOLIC BLOOD PRESSURE: 90 MMHG | HEIGHT: 64 IN | WEIGHT: 280 LBS | HEART RATE: 88 BPM

## 2025-05-02 DIAGNOSIS — E11.8 TYPE 2 DIABETES MELLITUS WITH COMPLICATION, WITHOUT LONG-TERM CURRENT USE OF INSULIN (HCC): ICD-10-CM

## 2025-05-02 DIAGNOSIS — E11.29 MICROALBUMINURIA DUE TO TYPE 2 DIABETES MELLITUS (HCC): Chronic | ICD-10-CM

## 2025-05-02 DIAGNOSIS — I10 HYPERTENSION, UNSPECIFIED TYPE: ICD-10-CM

## 2025-05-02 DIAGNOSIS — N18.2 CKD STAGE G2/A2, GFR 60-89 AND ALBUMIN CREATININE RATIO 30-299 MG/G: Chronic | ICD-10-CM

## 2025-05-02 DIAGNOSIS — F17.200 TOBACCO USE DISORDER: ICD-10-CM

## 2025-05-02 DIAGNOSIS — E88.810 METABOLIC SYNDROME: ICD-10-CM

## 2025-05-02 DIAGNOSIS — E78.1 FAMILIAL HYPERTRIGLYCERIDEMIA: ICD-10-CM

## 2025-05-02 DIAGNOSIS — G47.33 OBSTRUCTIVE SLEEP APNEA: Chronic | ICD-10-CM

## 2025-05-02 DIAGNOSIS — E88.819 INSULIN RESISTANCE: ICD-10-CM

## 2025-05-02 DIAGNOSIS — I11.9 HYPERTENSIVE HEART DISEASE WITHOUT HEART FAILURE: ICD-10-CM

## 2025-05-02 DIAGNOSIS — R80.9 MICROALBUMINURIA DUE TO TYPE 2 DIABETES MELLITUS (HCC): Chronic | ICD-10-CM

## 2025-05-02 PROCEDURE — 3075F SYST BP GE 130 - 139MM HG: CPT | Performed by: FAMILY MEDICINE

## 2025-05-02 PROCEDURE — 99204 OFFICE O/P NEW MOD 45 MIN: CPT | Performed by: FAMILY MEDICINE

## 2025-05-02 PROCEDURE — 99212 OFFICE O/P EST SF 10 MIN: CPT

## 2025-05-02 PROCEDURE — 3080F DIAST BP >= 90 MM HG: CPT | Performed by: FAMILY MEDICINE

## 2025-05-02 RX ORDER — TELMISARTAN 20 MG/1
20 TABLET ORAL DAILY
Qty: 100 TABLET | Refills: 3 | Status: SHIPPED | OUTPATIENT
Start: 2025-05-02

## 2025-05-02 RX ORDER — FENOFIBRIC ACID 135 MG/1
1 CAPSULE, DELAYED RELEASE ORAL DAILY
Qty: 100 CAPSULE | Refills: 3 | Status: SHIPPED | OUTPATIENT
Start: 2025-05-02

## 2025-05-02 RX ORDER — ICOSAPENT ETHYL 1 G/1
2 CAPSULE ORAL 2 TIMES DAILY
Qty: 120 CAPSULE | Refills: 11 | Status: SHIPPED | OUTPATIENT
Start: 2025-05-02

## 2025-05-02 ASSESSMENT — ENCOUNTER SYMPTOMS
COUGH: 0
WHEEZING: 0
CLAUDICATION: 0
PALPITATIONS: 0
SPUTUM PRODUCTION: 0
SHORTNESS OF BREATH: 0
HEMOPTYSIS: 0
FEVER: 0
ORTHOPNEA: 0
CHILLS: 0
PND: 0

## 2025-05-02 NOTE — PROGRESS NOTES
INITIAL FAMILY LIPID CLINIC VISIT   05/02/25     John Wills, intially referred for evaluation/mgmt of dyslipidemia, est 5/2025   Referral Source: Ref Not In Computer     Subjective    Initial visit history:   seen by PCP 2/26/25, with peristent moderately elevated HTG despite current tx.  Here to review tx options.  No prior acute pancreatitis or ASCVD events   Does not drink, no hx of acute panc.     Barriers to care/SDOH: none    DYSLIPIDEMIA  PERTINENT HLD PMHX  Age at Initial Dx:  at least 2019   Prior dyslipidemia genotyping? n  Famhx of severe HLD, FH, premature ASCVD? no, see fhx for details  Baseline Lipids Prior to Treatment:    Latest Reference Range & Units 09/08/21 06:08   Cholesterol,Tot 100 - 199 mg/dL 165   Triglycerides 0 - 149 mg/dL 721 (H)   HDL >=40 mg/dL 31 !   LDL <100 mg/dL see below     HX OF MAJOR ASCVD: None  OTHER ESTABLISHED CVD: None  Secondary contributors/causes of dyslipidemia: BMI, T2D, MetS, IR, CKD  Previous lipid-lowering meds and outcome: none     CURRENT MED MGMT  Current Rx:   Statin: rosuva 40mg   Non-Statin: fenofibrat 145mg   Supplements: None  Side effects? no  Adherence? yes    LIFESTYLE MGMT  Tobacco:  reports that he has been smoking cigarettes. He started smoking about 29 years ago. He has a 14.6 pack-year smoking history. He has never used smokeless tobacco.   Change in weight:  prior decrease, now increased to new stable baseline, prior low 150s  Exercise habits: no regular exercise program  Dietary patterns: common adult  Etoh: see sochx    HTN: Stable, tolerating meds with good adherence, Home BP log: not checking   Has of HF after severe HTN in 2019, has recovered per most recent echo    T2D: Stable. Tolerating meds and no recent med changes.   Last A1c   Lab Results   Component Value Date    HBA1C 7.1 (H) 06/18/2024     Lab Results   Component Value Date/Time    MALBCRT 49 (H) 02/24/2025 06:10 AM    MICROALBUR 3.7 02/24/2025 06:10 AM        ANTITHROMBOTIC TX: none  - no hx of bleeding        Patient Active Problem List   Diagnosis    ACC/AHA stage C systolic heart failure (HCC)    Type 2 diabetes mellitus with complication, without long-term current use of insulin (HCC)    Left ventricular systolic dysfunction, NYHA class 2    Hypertensive heart disease with chronic systolic congestive heart failure (HCC)    Dyslipidemia associated with type 2 diabetes mellitus (HCC)    Obstructive sleep apnea    Erectile dysfunction    Stage 3 chronic kidney disease due to type 2 diabetes mellitus (HCC)    Familial hypertriglyceridemia    Metabolic syndrome    Insulin resistance    CKD stage G2/A2, GFR 60-89 and albumin creatinine ratio  mg/g    Tobacco use disorder    Hypertension, unspecified type    Microalbuminuria due to type 2 diabetes mellitus (HCC)      has a past surgical history that includes tympanomastoidectomy (Left, 2000).  Current Outpatient Medications on File Prior to Visit   Medication Sig Dispense Refill    rosuvastatin (CRESTOR) 40 MG tablet Take 1 Tablet by mouth every day. 90 Tablet 3    Empagliflozin (JARDIANCE) 25 MG Tab Take 1 Tablet by mouth every day. 90 Tablet 3    carvedilol (COREG) 25 MG Tab Take 1 Tablet by mouth 2 times a day with meals. 180 Tablet 0    sildenafil citrate (VIAGRA) 100 MG tablet Take 1 Tablet by mouth 1 time a day as needed for Erectile Dysfunction. 10 Tablet 0     No current facility-administered medications on file prior to visit.      Allergies   Allergen Reactions    Sulfa Drugs Rash     Heat rash       Family History   Problem Relation Age of Onset    Sleep Apnea Father     Hypertension Father     Other Sister         gastroparesis    Stroke Maternal Aunt 55    Hypertension Maternal Grandmother      Social History     Tobacco Use    Smoking status: Every Day     Current packs/day: 0.50     Average packs/day: 0.5 packs/day for 29.1 years (14.6 ttl pk-yrs)     Types: Cigarettes     Start date: 3/16/1996     "Smokeless tobacco: Never   Vaping Use    Vaping status: Never Used   Substance Use Topics    Alcohol use: Never    Drug use: Never     Review of Systems   Constitutional:  Negative for chills, fever and malaise/fatigue.   Respiratory:  Negative for cough, hemoptysis, sputum production, shortness of breath and wheezing.    Cardiovascular:  Negative for chest pain, palpitations, orthopnea, claudication, leg swelling and PND.         Objective    Vitals:    05/02/25 1116 05/02/25 1120   BP: (!) 137/106 (!) 132/90   BP Location: Left arm Left arm   Patient Position: Sitting Sitting   BP Cuff Size: Large adult long Large adult long   Pulse: 87 88   Weight: (!) 127 kg (280 lb)    Height: 1.626 m (5' 4\")      BP Readings from Last 5 Encounters:   05/02/25 (!) 132/90   02/26/25 110/72   08/28/24 108/78   03/14/24 104/76   01/10/24 112/70     BMI Readings from Last 1 Encounters:   05/02/25 48.06 kg/m²     Wt Readings from Last 3 Encounters:   05/02/25 (!) 127 kg (280 lb)   02/26/25 (!) 129 kg (284 lb)   08/28/24 124 kg (274 lb)     Physical Exam  Constitutional:       General: He is not in acute distress.     Appearance: Normal appearance. He is not ill-appearing.   HENT:      Head: Normocephalic and atraumatic.   Eyes:      Conjunctiva/sclera: Conjunctivae normal.      Comments: No corneal arcus   Neck:      Vascular: No carotid bruit.   Cardiovascular:      Rate and Rhythm: Normal rate and regular rhythm.      Pulses:           Carotid pulses are 2+ on the right side and 2+ on the left side.       Radial pulses are 2+ on the right side and 2+ on the left side.        Popliteal pulses are 2+ on the right side and 2+ on the left side.        Dorsalis pedis pulses are 2+ on the right side and 2+ on the left side.      Heart sounds: No murmur heard.  Pulmonary:      Effort: Pulmonary effort is normal.      Breath sounds: Normal breath sounds.   Musculoskeletal:      Cervical back: Normal range of motion.      Right lower leg: " "No edema.      Left lower leg: No edema.   Feet:      Comments: No achilles thickening or nodularity  Skin:     General: Skin is warm and dry.      Coloration: Skin is not cyanotic or mottled.      Findings: No wound.      Comments: No tendon xanthomas,eruptive xanthomas, or palmar crease xanthomas    Neurological:      General: No focal deficit present.      Mental Status: He is alert.   Psychiatric:         Mood and Affect: Mood normal.       DATA REVIEW:  Most Recent Lipid Panel:   Lab Results   Component Value Date/Time    CHOLSTRLTOT 148 02/24/2025 06:10 AM    LDL see below 02/24/2025 06:10 AM    HDL 32 (A) 02/24/2025 06:10 AM    TRIGLYCERIDE 424 (H) 02/24/2025 06:10 AM      Latest Reference Range & Units 04/01/22 06:10 08/04/23 06:14 11/02/23 06:18 06/18/24 06:11 02/24/25 06:10   Triglycerides 0 - 149 mg/dL 368 (H) 513 (H) 326 (H) 459 (H) 424 (H)     Lab Results   Component Value Date/Time    LDL see below 02/24/2025 06:10 AM    LDL see below 06/18/2024 06:11 AM    LDL 40 11/02/2023 06:18 AM    LDL see below 08/04/2023 06:14 AM    LDL 58 04/01/2022 06:10 AM      No results found for: \"LIPOPROTA\"   No results found for: \"APOB\"   No results found for: \"CRPHIGHSEN\"      Lab Results   Component Value Date    SODIUM 141 06/18/2024    POTASSIUM 4.0 06/18/2024    CHLORIDE 104 06/18/2024    CO2 23 06/18/2024    ANION 14.0 06/18/2024    GLUCOSE 138 (H) 06/18/2024    BUN 25 (H) 06/18/2024    CREATININE 1.39 06/18/2024    CALCIUM 9.7 06/18/2024    ASTSGOT 33 06/18/2024    ALTSGPT 38 06/18/2024    ALKPHOSPHAT 64 06/18/2024    TBILIRUBIN 1.0 06/18/2024    ALBUMIN 4.6 06/18/2024    AGRATIO 1.5 06/18/2024    TSHULTRASEN 2.680 06/18/2024     Lab Results   Component Value Date/Time    HBA1C 7.1 (H) 06/18/2024 06:11 AM    HBA1C 6.7 (A) 06/14/2023 11:48 AM    HBA1C 6.6 (A) 12/14/2022 11:31 AM      Lab Results   Component Value Date/Time    MALBCRT 49 (H) 02/24/2025 06:10 AM    MICROALBUR 3.7 02/24/2025 06:10 AM    "       IMAGIN echo   No prior study is available for comparison.   Normal left ventricular chamber size.  Left ventricular ejection fraction is visually estimated to be 40%.  Normal inferior vena cava size and inspiratory collapse.  Poor valvular visualization.    2019 Echo  Compared to the images of the prior study done 2019, the EF has   normalized.  Moderate concentric left ventricular hypertrophy.  Left ventricular ejection fraction is visually estimated to be 55%.  Unable to estimate pulmonary artery pressure due to an inadequate   tricuspid regurgitant jet.    2024 Echo   Mild concentric left ventricular hypertrophy.  The left ventricular ejection fraction is visually estimated to be 60%.  Normal inferior vena cava size and inspiratory collapse.      PROCEDURES: none          ASSESSMENT AND PLAN  1. Familial hypertriglyceridemia  APOLIPOPROTEIN B    Lipoprotein (a)    APO E GENOTYPING,CARDIO RISK    Icosapent Ethyl (VASCEPA) 1 g Cap    Choline Fenofibrate (FENOFIBRIC ACID) 135 MG CAPSULE DELAYED RELEASE      2. Type 2 diabetes mellitus with complication, without long-term current use of insulin (HCC)  CRP HIGH SENSITIVE (CARDIAC)    HEMOGLOBIN A1C      3. Microalbuminuria due to type 2 diabetes mellitus (HCC)        4. Hypertensive heart disease without heart failure        5. Obstructive sleep apnea        6. Metabolic syndrome  CRP HIGH SENSITIVE (CARDIAC)      7. Insulin resistance  CRP HIGH SENSITIVE (CARDIAC)      8. CKD stage G2/A2, GFR 60-89 and albumin creatinine ratio  mg/g        9. Hypertension, unspecified type  telmisartan (MICARDIS) 20 MG tablet    CBC WITHOUT DIFFERENTIAL    Comp Metabolic Panel    Lipid Profile    MICROALBUMIN CREAT RATIO URINE    ALDOSTERONE    RENIN ACTIVITY      10. Tobacco use disorder          PATIENT TYPE: Type 2 Diabetes Mellitus    MAJOR ASCVD: None      OTHER ESTABLISHED CVD: None    EVIDENCE OF GENETIC DYSLIPIDEMIA: Yes   PRESUMED DX: likely  familial HTG (FHTG, type IV) with secondary contributing factors vs FDBL (type III)  - no prior acute panc, so less likely MCS (type V)  - stable TC, LDL, so precludes IIb (FCHL)     FH / LIPIDEMIA genotyping: NO    APOE genotyping (for familial dysbetalipoproteinemia eval): YES  Benefits/limitations/risks for dyslipidemic genetic testing reviewed.  Further resources for review available at family[facility].org site    Indications and rationale for dyslipidemia genetic testing:  -Strong clinical suspicion of a genetic dyslipidemia.  - assist with definitive diagnosis and aid in treatment decision-making  -Strong family history of dyslipidemia or its complications.  -Presence of related syndromic features  -Evidence that testing might .  -Available and effective early interventions exist.  -Eligibility for new or investigational drugs.  -Patient preference.  -Family planning  - informing need for cascade screening of family members     Benefits include:   -Confirmation of a clinical diagnosis of FH, especially in cases where it is not clear whether the person has FH or not.  -Provides more information about one’s risk or diagnosis, since not all individuals with FH present the same.  -Often results in initiation and intensification of therapy by a healthcare provider. Studies have also shown that individuals with FH are more willing to start, intensify or continue taking prescribed medications when given genetic confirmation.  -Provides information regarding why a healthy lifestyle and diet have not been able to control cholesterol levels on their own.  -Helps other family members to be screened.  -Determines whether or not FH has been passed down to a child, since everyone with -FH has a 50% chance of doing so.    SECONDARY CAUSES / CONTRIBUTORS: yes  Metabolic:  T2D, MetS, IR, BMI >40  Thyroid disease: none reported   Liver disease: none reported   Renal disease/nephrotic syndrome:  hx advanced CKD, now  "recovered to G2 (raises TGs)  Dietary-induced (ketogenic, lean mass hyper-responder)? no  Medications: None  Alcohol use: no    ACC/AHA INDICATION FOR STATIN THERAPY:  Diabetes and UACR >30 and eGFR <60  Severe HTG     ASCVD RISK CALCULATIONS:  PCE: The 10-year ASCVD risk score (Tessy LARKIN, et al., 2019) is: 13.1%, 7.5 - <20% \"intermediate risk\"     OTHER SIGNIFICANT RISK MARKERS:  High-risk conditions: N/A  Risk-enhancers: Metabolic syndrome  and Persistently elevated trigs >174  Lipoprotein(a): ordered this visit     - additional advanced biomarkers ordered     TARGETS / GOALS (per most recent ACC/AHA guidelines):  At goal as of 2/2024?   Primary (panc risk):  TG <500 - MET  Secondary (ascvd risk): apoB <90 (consider <70)- Pending labs        Non-HDL-C <130 (or <100) - NOT MET       Direct LDL-C <100 (consider <70) - Pending labs   Tertiary:    TG <150 - NOT MET     LIFESTYLE INTERVENTIONS  TOBACCO:  reports that he has been smoking cigarettes. He started smoking about 29 years ago. He has a 14.6 pack-year smoking history. He has never used smokeless tobacco.   - continued complete avoidance of all tobacco products     PHYSICAL ACTIVITY: at least 150 min per week of moderate intensity    NUTRITION: Triglyceride-lowering diet with reduced fat calories, reduced simple carbohydrates, reduce/avoid alcohol, Weight reduction - reduce caloric intake by 300 - 500 kcal/day to achieve 1-2lb weight loss per week , and - handout provided     ETOH: does not drink    WT MGMT:  focus on 5-7% reduction as initial goal  (1kg loss reduces SBP by 1 mmHg)  - consider use of various wt reducing interventions and average wt loss potential:   Baseline diet/exercise (5-7+%)   Plus low intensity (phentermine, qsymia, contrave - 5-10+%)   And/or high-intensity meds (liraglutide (5-10%), semaglutide (12-15%), tirzepatide (15-20%))   And/org wt-reducing surgery (20-30+%)  - suggested for ongoing f/u with PCP to review above     LIPID-LOWERING " MEDICATION MANAGEMENT:     Statin Therapy:   - continue rosuva 40mg daily     Non-Statin Therapies:     LDL/apoB therapies:  EZETIMIBE: consider as add-on for 10-15% TG lowering   BEMPEDOIC ACID: condidate   BAS: not currently indicated   PCSK9 mAb: not indicated   INCLISIRAN: not indicated     TG therapies:  OMEGA-3 FAs: start icosapent 2g BID  FOR PRIOR AUTH:  SEVERE HTG >150 DESPITE MAX STATIN + FIBRATE IN MALE PATIENT WITH HIGH RISK TYPE 2 DM WITH HTN, HF, AND HIGH UACR.  FIBRATE: transition to fenofibric acid 135mg daily - FDA approved with max statin, best bioavail and potency (no renal dosing needed as gfr >60)  OLEZARSEN (ApoC3i): for genotypic FCS only, not currently indicated     LP(a) modifying therapy: available in clinical research trials only at this time     PCSK9i strategy indications: Not currently indicated    RECOMMENDED SUPPLEMENTS: None     APHERESIS: n/a     BLOOD PRESSURE CONTROL   Office BP goal per ACC/AHA <130/80  Home BP at goal:  N/A  Office BP at goal:  no  24h ABPM:  not ordered to date   RDN candidate? YES  Contributing factors: BMI, T2D, lifestyle    Plan:   - start/continue home BP monitoring, reviewed correct technique, provide BP log and instructions  - order 24h ABPM:  UNDECIDED  - routine monitoring of lytes/gfr   Medications:  - start telmisartan 20mg daily - best for MetS, HTG pts   - continue carvedilol 25mg BID (HF, BP)    GLYCEMIC STATUS/MGMT: Diabetic, T2 with HTN, HLD, high UACR   Goal A1c < 7  Lab Results   Component Value Date/Time    HBA1C 7.1 (H) 06/18/2024 06:11 AM    HBA1C 6.7 (A) 06/14/2023 11:48 AM    HBA1C 6.6 (A) 12/14/2022 11:31 AM       Lab Results   Component Value Date/Time    MALBCRT 49 (H) 02/24/2025 06:10 AM    MICROALBUR 3.7 02/24/2025 06:10 AM     Plan:  - continue current medication plan   - consider pharm DM clinic ref if persistent a1c >7  - recommmend for routine care with PCP (or endocrine) to include regular A1c monitoring, annual  albumin/creatinine ratio (ACR), annual diabetic retinopathy screening, foot exams, annual flu vaccine, and updates to pneumonia vaccines as appropriate      ANTITHROMBOTIC THERAPY: Not currently recommended    OTHER: none     STUDIES: none   FOLLOW-UP: 3 months    Renard Connelly M.D.  FREDA, Board-certified Clinical Lipidologist   [facility]   [facility]   [phone]

## 2025-05-06 ENCOUNTER — HOSPITAL ENCOUNTER (OUTPATIENT)
Dept: LAB | Facility: MEDICAL CENTER | Age: 45
End: 2025-05-06
Attending: NURSE PRACTITIONER
Payer: COMMERCIAL

## 2025-05-06 DIAGNOSIS — E11.8 TYPE 2 DIABETES MELLITUS WITH COMPLICATION, WITHOUT LONG-TERM CURRENT USE OF INSULIN (HCC): ICD-10-CM

## 2025-05-06 DIAGNOSIS — E55.9 VITAMIN D DEFICIENCY: ICD-10-CM

## 2025-05-06 DIAGNOSIS — I10 HYPERTENSION, UNSPECIFIED TYPE: ICD-10-CM

## 2025-05-06 DIAGNOSIS — Z13.29 SCREENING FOR THYROID DISORDER: ICD-10-CM

## 2025-05-06 LAB
25(OH)D3 SERPL-MCNC: 20 NG/ML (ref 30–100)
ALBUMIN SERPL BCP-MCNC: 4.4 G/DL (ref 3.2–4.9)
ALBUMIN/GLOB SERPL: 1.3 G/DL
ALP SERPL-CCNC: 72 U/L (ref 30–99)
ALT SERPL-CCNC: 44 U/L (ref 2–50)
ANION GAP SERPL CALC-SCNC: 13 MMOL/L (ref 7–16)
AST SERPL-CCNC: 38 U/L (ref 12–45)
BILIRUB SERPL-MCNC: 0.5 MG/DL (ref 0.1–1.5)
BUN SERPL-MCNC: 29 MG/DL (ref 8–22)
CALCIUM ALBUM COR SERPL-MCNC: 9.5 MG/DL (ref 8.5–10.5)
CALCIUM SERPL-MCNC: 9.8 MG/DL (ref 8.5–10.5)
CHLORIDE SERPL-SCNC: 104 MMOL/L (ref 96–112)
CO2 SERPL-SCNC: 21 MMOL/L (ref 20–33)
CREAT SERPL-MCNC: 1.66 MG/DL (ref 0.5–1.4)
EST. AVERAGE GLUCOSE BLD GHB EST-MCNC: 197 MG/DL
GFR SERPLBLD CREATININE-BSD FMLA CKD-EPI: 51 ML/MIN/1.73 M 2
GLOBULIN SER CALC-MCNC: 3.5 G/DL (ref 1.9–3.5)
GLUCOSE SERPL-MCNC: 166 MG/DL (ref 65–99)
HBA1C MFR BLD: 8.5 % (ref 4–5.6)
POTASSIUM SERPL-SCNC: 4.2 MMOL/L (ref 3.6–5.5)
PROT SERPL-MCNC: 7.9 G/DL (ref 6–8.2)
SODIUM SERPL-SCNC: 138 MMOL/L (ref 135–145)
T4 FREE SERPL-MCNC: 1.09 NG/DL (ref 0.93–1.7)
TSH SERPL-ACNC: 3.22 UIU/ML (ref 0.38–5.33)

## 2025-05-06 PROCEDURE — 84443 ASSAY THYROID STIM HORMONE: CPT

## 2025-05-06 PROCEDURE — 83036 HEMOGLOBIN GLYCOSYLATED A1C: CPT

## 2025-05-06 PROCEDURE — 80053 COMPREHEN METABOLIC PANEL: CPT

## 2025-05-06 PROCEDURE — 36415 COLL VENOUS BLD VENIPUNCTURE: CPT

## 2025-05-06 PROCEDURE — 82306 VITAMIN D 25 HYDROXY: CPT

## 2025-05-06 PROCEDURE — 84439 ASSAY OF FREE THYROXINE: CPT

## 2025-05-15 ENCOUNTER — TELEPHONE (OUTPATIENT)
Dept: VASCULAR LAB | Facility: MEDICAL CENTER | Age: 45
End: 2025-05-15
Payer: COMMERCIAL

## 2025-05-15 NOTE — TELEPHONE ENCOUNTER
Received New Start request via [employer]  for ICOSAPENT ETHYL (VASCEPA) 1G CAPSULES. (Quantity:360, Day Supply:90)     Insurance[place] [employer] [place]   Member ID:  [id number]  BIN: [id number]  PCN: [id number]  Group: [id number]     Ran Test claim via [employer] & medication Rejects stating prior authorization is required.    DELMI Mendoza, PhT  Vascular Pharmacy Liaison (Rx Coordinator)  P: [phone]  5/15/2025 9:54 AM

## 2025-05-15 NOTE — TELEPHONE ENCOUNTER
Prior Authorization for ICOSAPENT ETHYL (VASCEPA) 1G CAPSULES.  (Quantity: 360, Days: 90) has been submitted via Cover My Meds: Key ([id number])    Insurance[place] [employer] [place]     Will follow up in 24-48 business hours.     DELMI Mendoza, PhT  Vascular Pharmacy Liaison (Rx Coordinator)  P: [phone]  5/15/2025 9:54 AM

## 2025-05-19 NOTE — TELEPHONE ENCOUNTER
New PA submitted for ICOSAPENT ETHYL (VASCEPA) 1G CAPSULES.  has been approved for a quantity of 360 , day supply 90    PA reference number: [id number]  [place]urance: [employer] [place]   Effective dates: 05/15/2025-12/31/2039  Copay: $15/30DS    Is patient eligible to fill with [facility]? Yes    Next Steps: will outreach to pt to offer [facility] mail delivery and RXC services and or release it to pt's preferred pharmacy.    DELMI Mendoza, PhT  Vascular Pharmacy Liaison (Rx Coordinator)  P: [phone]  5/19/2025 2:31 PM

## 2025-05-19 NOTE — TELEPHONE ENCOUNTER
Attempted to contact patient at [phone] to discuss [facility] and services/benefits offered. No answer, left voicemail.    DELMI Mendoza, PhT  Vascular Pharmacy Liaison (Rx Coordinator)  P: [phone]  5/19/2025 2:33 PM

## 2025-05-20 NOTE — TELEPHONE ENCOUNTER
3rd call attempt has been made today, and pt didn't answer. At this time, we are unable to reach pt to offer [facility] mail order and RXC services.     Will release Rx pharmacy on file: [id number]---[address], [place] [address].     Sun Hammer, DELMI, PhT  Vascular Pharmacy Liaison (Rx Coordinator)  P: [phone]  5/20/2025 3:28 PM

## 2025-05-20 NOTE — TELEPHONE ENCOUNTER
Attempted to call pt for the 2nd time, and left a detailed message to offer [facility] mail order and RXC services.     Will attempt to call pt at a later time.     DELMI Mendoza, PhT  Vascular Pharmacy Liaison (Rx Coordinator)  P: [phone]  5/20/2025 8:42 AM

## 2025-05-28 NOTE — TELEPHONE ENCOUNTER
Called [facility] pharmacy  @ [phone] and s/w Jena PhDANIA to obtain  status for Icosapent. Per Jena, medication is still ready for   for  $15/30DS.     Called pt @ [phone] and left a detailed message.     DELMI Mendoza, PhT  Vascular Pharmacy Liaison (Rx Coordinator)  P: [phone]  5/29/2025 9:47 AM

## 2025-05-29 NOTE — TELEPHONE ENCOUNTER
Pt called back and states that he haven't picked up his Vascepa yet, as he is waiting for his paycheck to arrive, but added that he should be able to pick it up tomorrow the latest.     Updated and notified provider and clinic staff for the status.     DELMI Mendoza, PhT  Vascular Pharmacy Liaison (Rx Coordinator)  P: [phone]  5/29/2025 10:06 AM

## 2025-06-09 ENCOUNTER — RESULTS FOLLOW-UP (OUTPATIENT)
Dept: MEDICAL GROUP | Facility: PHYSICIAN GROUP | Age: 45
End: 2025-06-09

## 2025-07-21 ENCOUNTER — HOSPITAL ENCOUNTER (OUTPATIENT)
Dept: LAB | Facility: MEDICAL CENTER | Age: 45
End: 2025-07-21
Attending: FAMILY MEDICINE
Payer: COMMERCIAL

## 2025-07-21 DIAGNOSIS — E88.810 METABOLIC SYNDROME: ICD-10-CM

## 2025-07-21 DIAGNOSIS — E88.819 INSULIN RESISTANCE: ICD-10-CM

## 2025-07-21 DIAGNOSIS — I10 HYPERTENSION, UNSPECIFIED TYPE: ICD-10-CM

## 2025-07-21 DIAGNOSIS — E11.8 TYPE 2 DIABETES MELLITUS WITH COMPLICATION, WITHOUT LONG-TERM CURRENT USE OF INSULIN (HCC): ICD-10-CM

## 2025-07-21 DIAGNOSIS — E78.1 FAMILIAL HYPERTRIGLYCERIDEMIA: ICD-10-CM

## 2025-07-21 LAB
ALBUMIN SERPL BCP-MCNC: 4.6 G/DL (ref 3.2–4.9)
ALBUMIN/GLOB SERPL: 1.4 G/DL
ALP SERPL-CCNC: 68 U/L (ref 30–99)
ALT SERPL-CCNC: 45 U/L (ref 2–50)
ANION GAP SERPL CALC-SCNC: 15 MMOL/L (ref 7–16)
AST SERPL-CCNC: 37 U/L (ref 12–45)
BILIRUB SERPL-MCNC: 0.5 MG/DL (ref 0.1–1.5)
BUN SERPL-MCNC: 25 MG/DL (ref 8–22)
CALCIUM ALBUM COR SERPL-MCNC: 9.5 MG/DL (ref 8.5–10.5)
CALCIUM SERPL-MCNC: 10 MG/DL (ref 8.5–10.5)
CHLORIDE SERPL-SCNC: 103 MMOL/L (ref 96–112)
CHOLEST SERPL-MCNC: 178 MG/DL (ref 100–199)
CO2 SERPL-SCNC: 20 MMOL/L (ref 20–33)
CREAT SERPL-MCNC: 1.69 MG/DL (ref 0.5–1.4)
CREAT UR-MCNC: 62.7 MG/DL
CRP SERPL HS-MCNC: 2.6 MG/L (ref 0–3)
ERYTHROCYTE [DISTWIDTH] IN BLOOD BY AUTOMATED COUNT: 44.1 FL (ref 35.9–50)
EST. AVERAGE GLUCOSE BLD GHB EST-MCNC: 186 MG/DL
GFR SERPLBLD CREATININE-BSD FMLA CKD-EPI: 50 ML/MIN/1.73 M 2
GLOBULIN SER CALC-MCNC: 3.2 G/DL (ref 1.9–3.5)
GLUCOSE SERPL-MCNC: 181 MG/DL (ref 65–99)
HBA1C MFR BLD: 8.1 % (ref 4–5.6)
HCT VFR BLD AUTO: 53.2 % (ref 42–52)
HDLC SERPL-MCNC: 31 MG/DL
HGB BLD-MCNC: 17.7 G/DL (ref 14–18)
LDLC SERPL CALC-MCNC: ABNORMAL MG/DL
MCH RBC QN AUTO: 30.3 PG (ref 27–33)
MCHC RBC AUTO-ENTMCNC: 33.3 G/DL (ref 32.3–36.5)
MCV RBC AUTO: 91.1 FL (ref 81.4–97.8)
MICROALBUMIN UR-MCNC: 2.6 MG/DL
MICROALBUMIN/CREAT UR: 41 MG/G (ref 0–30)
PLATELET # BLD AUTO: 158 K/UL (ref 164–446)
PMV BLD AUTO: 10.9 FL (ref 9–12.9)
POTASSIUM SERPL-SCNC: 4.2 MMOL/L (ref 3.6–5.5)
PROT SERPL-MCNC: 7.8 G/DL (ref 6–8.2)
RBC # BLD AUTO: 5.84 M/UL (ref 4.7–6.1)
SODIUM SERPL-SCNC: 138 MMOL/L (ref 135–145)
TRIGL SERPL-MCNC: 663 MG/DL (ref 0–149)
WBC # BLD AUTO: 7.2 K/UL (ref 4.8–10.8)

## 2025-07-21 PROCEDURE — 36415 COLL VENOUS BLD VENIPUNCTURE: CPT

## 2025-07-21 PROCEDURE — 83036 HEMOGLOBIN GLYCOSYLATED A1C: CPT

## 2025-07-21 PROCEDURE — 84244 ASSAY OF RENIN: CPT

## 2025-07-21 PROCEDURE — 80061 LIPID PANEL: CPT

## 2025-07-21 PROCEDURE — 82043 UR ALBUMIN QUANTITATIVE: CPT

## 2025-07-21 PROCEDURE — 82172 ASSAY OF APOLIPOPROTEIN: CPT

## 2025-07-21 PROCEDURE — 83695 ASSAY OF LIPOPROTEIN(A): CPT

## 2025-07-21 PROCEDURE — 82570 ASSAY OF URINE CREATININE: CPT

## 2025-07-21 PROCEDURE — 86141 C-REACTIVE PROTEIN HS: CPT

## 2025-07-21 PROCEDURE — 80053 COMPREHEN METABOLIC PANEL: CPT

## 2025-07-21 PROCEDURE — 85027 COMPLETE CBC AUTOMATED: CPT

## 2025-07-21 PROCEDURE — 82088 ASSAY OF ALDOSTERONE: CPT

## 2025-07-23 LAB
ALDOST SERPL-MCNC: 10.3 NG/DL
APO B100 SERPL-MCNC: 92 MG/DL (ref 66–133)
LPA SERPL-MCNC: 108 MG/DL

## 2025-07-24 LAB — RENIN PLAS-CCNC: 1.6 NG/ML/HR

## 2025-08-26 DIAGNOSIS — I10 HYPERTENSION, UNSPECIFIED TYPE: ICD-10-CM

## 2025-08-26 RX ORDER — CARVEDILOL 25 MG/1
25 TABLET ORAL 2 TIMES DAILY WITH MEALS
Qty: 180 TABLET | Refills: 0 | Status: SHIPPED | OUTPATIENT
Start: 2025-08-26